# Patient Record
Sex: MALE | Race: BLACK OR AFRICAN AMERICAN | NOT HISPANIC OR LATINO
[De-identification: names, ages, dates, MRNs, and addresses within clinical notes are randomized per-mention and may not be internally consistent; named-entity substitution may affect disease eponyms.]

---

## 2017-05-09 ENCOUNTER — APPOINTMENT (OUTPATIENT)
Dept: ENDOCRINOLOGY | Facility: CLINIC | Age: 82
End: 2017-05-09

## 2017-05-09 ENCOUNTER — RESULT CHARGE (OUTPATIENT)
Age: 82
End: 2017-05-09

## 2017-05-09 VITALS
BODY MASS INDEX: 33.29 KG/M2 | DIASTOLIC BLOOD PRESSURE: 60 MMHG | SYSTOLIC BLOOD PRESSURE: 120 MMHG | RESPIRATION RATE: 17 BRPM | OXYGEN SATURATION: 94 % | WEIGHT: 195 LBS | HEIGHT: 64 IN | HEART RATE: 65 BPM | TEMPERATURE: 98.4 F

## 2017-05-09 LAB — GLUCOSE BLDC GLUCOMTR-MCNC: 53

## 2017-05-09 RX ORDER — TAMSULOSIN HYDROCHLORIDE 0.4 MG/1
0.4 CAPSULE ORAL
Qty: 90 | Refills: 0 | Status: ACTIVE | COMMUNITY
Start: 2017-05-09 | End: 1900-01-01

## 2017-05-11 LAB
ALBUMIN SERPL ELPH-MCNC: 4.4 G/DL
ALP BLD-CCNC: 61 U/L
ALT SERPL-CCNC: 8 U/L
ANION GAP SERPL CALC-SCNC: 19 MMOL/L
AST SERPL-CCNC: 26 U/L
BILIRUB SERPL-MCNC: 0.4 MG/DL
BUN SERPL-MCNC: 26 MG/DL
CALCIUM SERPL-MCNC: 10 MG/DL
CHLORIDE SERPL-SCNC: 103 MMOL/L
CHOLEST SERPL-MCNC: 174 MG/DL
CHOLEST/HDLC SERPL: 4 RATIO
CO2 SERPL-SCNC: 26 MMOL/L
CREAT SERPL-MCNC: 1.08 MG/DL
GLUCOSE SERPL-MCNC: 71 MG/DL
HBA1C MFR BLD HPLC: 8.9 %
HDLC SERPL-MCNC: 41 MG/DL
LDLC SERPL CALC-MCNC: 113 MG/DL
POTASSIUM SERPL-SCNC: 4.8 MMOL/L
PROT SERPL-MCNC: 8.1 G/DL
SODIUM SERPL-SCNC: 148 MMOL/L
TRIGL SERPL-MCNC: 101 MG/DL
TSH SERPL-ACNC: 3.21 UIU/ML

## 2017-06-16 ENCOUNTER — MED ADMIN CHARGE (OUTPATIENT)
Age: 82
End: 2017-06-16

## 2017-10-03 ENCOUNTER — OUTPATIENT (OUTPATIENT)
Dept: OUTPATIENT SERVICES | Facility: HOSPITAL | Age: 82
LOS: 1 days | End: 2017-10-03
Payer: COMMERCIAL

## 2017-10-03 VITALS
RESPIRATION RATE: 18 BRPM | HEART RATE: 87 BPM | TEMPERATURE: 98 F | HEIGHT: 67 IN | DIASTOLIC BLOOD PRESSURE: 68 MMHG | WEIGHT: 250 LBS | OXYGEN SATURATION: 96 % | SYSTOLIC BLOOD PRESSURE: 144 MMHG

## 2017-10-03 DIAGNOSIS — R07.9 CHEST PAIN, UNSPECIFIED: ICD-10-CM

## 2017-10-03 LAB
ALBUMIN SERPL ELPH-MCNC: 4.2 G/DL — SIGNIFICANT CHANGE UP (ref 3.3–5)
ALP SERPL-CCNC: 62 U/L — SIGNIFICANT CHANGE UP (ref 40–120)
ALT FLD-CCNC: 15 U/L RC — SIGNIFICANT CHANGE UP (ref 10–45)
ANION GAP SERPL CALC-SCNC: 14 MMOL/L — SIGNIFICANT CHANGE UP (ref 5–17)
AST SERPL-CCNC: 24 U/L — SIGNIFICANT CHANGE UP (ref 10–40)
BILIRUB SERPL-MCNC: 0.3 MG/DL — SIGNIFICANT CHANGE UP (ref 0.2–1.2)
BUN SERPL-MCNC: 18 MG/DL — SIGNIFICANT CHANGE UP (ref 7–23)
CALCIUM SERPL-MCNC: 9.6 MG/DL — SIGNIFICANT CHANGE UP (ref 8.4–10.5)
CHLORIDE SERPL-SCNC: 101 MMOL/L — SIGNIFICANT CHANGE UP (ref 96–108)
CO2 SERPL-SCNC: 31 MMOL/L — SIGNIFICANT CHANGE UP (ref 22–31)
CREAT SERPL-MCNC: 0.99 MG/DL — SIGNIFICANT CHANGE UP (ref 0.5–1.3)
GLUCOSE SERPL-MCNC: 58 MG/DL — LOW (ref 70–99)
HCT VFR BLD CALC: 40.1 % — SIGNIFICANT CHANGE UP (ref 39–50)
HGB BLD-MCNC: 12.3 G/DL — LOW (ref 13–17)
MCHC RBC-ENTMCNC: 26 PG — LOW (ref 27–34)
MCHC RBC-ENTMCNC: 30.7 GM/DL — LOW (ref 32–36)
MCV RBC AUTO: 84.5 FL — SIGNIFICANT CHANGE UP (ref 80–100)
PLATELET # BLD AUTO: 188 K/UL — SIGNIFICANT CHANGE UP (ref 150–400)
POTASSIUM SERPL-MCNC: 4.3 MMOL/L — SIGNIFICANT CHANGE UP (ref 3.5–5.3)
POTASSIUM SERPL-SCNC: 4.3 MMOL/L — SIGNIFICANT CHANGE UP (ref 3.5–5.3)
PROT SERPL-MCNC: 7.7 G/DL — SIGNIFICANT CHANGE UP (ref 6–8.3)
RBC # BLD: 4.75 M/UL — SIGNIFICANT CHANGE UP (ref 4.2–5.8)
RBC # FLD: 13.4 % — SIGNIFICANT CHANGE UP (ref 10.3–14.5)
SODIUM SERPL-SCNC: 146 MMOL/L — HIGH (ref 135–145)
WBC # BLD: 4.5 K/UL — SIGNIFICANT CHANGE UP (ref 3.8–10.5)
WBC # FLD AUTO: 4.5 K/UL — SIGNIFICANT CHANGE UP (ref 3.8–10.5)

## 2017-10-03 PROCEDURE — 80053 COMPREHEN METABOLIC PANEL: CPT

## 2017-10-03 PROCEDURE — 85027 COMPLETE CBC AUTOMATED: CPT

## 2017-10-03 PROCEDURE — 99152 MOD SED SAME PHYS/QHP 5/>YRS: CPT

## 2017-10-03 PROCEDURE — C1887: CPT

## 2017-10-03 PROCEDURE — 93010 ELECTROCARDIOGRAM REPORT: CPT

## 2017-10-03 PROCEDURE — 93005 ELECTROCARDIOGRAM TRACING: CPT

## 2017-10-03 PROCEDURE — 93458 L HRT ARTERY/VENTRICLE ANGIO: CPT

## 2017-10-03 PROCEDURE — C1769: CPT

## 2017-10-03 RX ORDER — SODIUM CHLORIDE 9 MG/ML
3 INJECTION INTRAMUSCULAR; INTRAVENOUS; SUBCUTANEOUS EVERY 8 HOURS
Qty: 0 | Refills: 0 | Status: DISCONTINUED | OUTPATIENT
Start: 2017-10-03 | End: 2017-10-18

## 2017-10-03 NOTE — H&P CARDIOLOGY - HISTORY OF PRESENT ILLNESS
82 years old male pt with PMHx of HTN, HLD, DMT2 (A1C 9), MI, CAD with stent 12 years ago([facility]) who presents for cardiac cath. Pt reports that he has been experiencing chest pain on & off with left arm numbness, evaluated by Dr. Mando Arshad, had abnormal stress test. Currently denies chest pain or SOB.   Stress test: Moderate area of mild to moderate myocardial hypoperfusion at the inferior wall and inferolateral in response of Adenosine infusion. A smaller area of mild apical hypo perfusion is noted as well. Inferior and inferolateral changes persist on rest images but the apical abnormality is improved. Inferior and inferolateral infarction with mild area of apical ischemia is suggested, EF 62%

## 2017-10-03 NOTE — H&P CARDIOLOGY - PMH
Benign prostatic hypertrophy    CAD (coronary artery disease)    Diabetes    GERD (gastroesophageal reflux disease)    HLD (hyperlipidemia)    HTN (hypertension)    MI (myocardial infarction)    Prostate cancer    Stented coronary artery

## 2018-03-20 ENCOUNTER — APPOINTMENT (OUTPATIENT)
Dept: ENDOCRINOLOGY | Facility: CLINIC | Age: 83
End: 2018-03-20
Payer: MEDICARE

## 2018-03-20 VITALS
DIASTOLIC BLOOD PRESSURE: 60 MMHG | SYSTOLIC BLOOD PRESSURE: 118 MMHG | HEIGHT: 64 IN | WEIGHT: 189 LBS | BODY MASS INDEX: 32.27 KG/M2

## 2018-03-20 DIAGNOSIS — E11.65 TYPE 2 DIABETES MELLITUS WITH HYPERGLYCEMIA: ICD-10-CM

## 2018-03-20 LAB — GLUCOSE BLDC GLUCOMTR-MCNC: 60

## 2018-03-20 PROCEDURE — 99214 OFFICE O/P EST MOD 30 MIN: CPT | Mod: 25

## 2018-03-20 PROCEDURE — 82962 GLUCOSE BLOOD TEST: CPT

## 2018-03-26 ENCOUNTER — MEDICATION RENEWAL (OUTPATIENT)
Age: 83
End: 2018-03-26

## 2018-03-26 RX ORDER — BLOOD SUGAR DIAGNOSTIC
STRIP MISCELLANEOUS 3 TIMES DAILY
Qty: 270 | Refills: 0 | Status: ACTIVE | COMMUNITY
Start: 2018-03-20 | End: 1900-01-01

## 2018-03-27 ENCOUNTER — MEDICATION RENEWAL (OUTPATIENT)
Age: 83
End: 2018-03-27

## 2018-06-13 ENCOUNTER — MEDICATION RENEWAL (OUTPATIENT)
Age: 83
End: 2018-06-13

## 2018-06-14 RX ORDER — INSULIN ASPART 100 [IU]/ML
(70-30) 100 INJECTION, SUSPENSION SUBCUTANEOUS TWICE DAILY
Qty: 19 | Refills: 0 | Status: ACTIVE | COMMUNITY
Start: 2017-05-09 | End: 1900-01-01

## 2018-09-12 ENCOUNTER — EMERGENCY (EMERGENCY)
Facility: HOSPITAL | Age: 83
LOS: 1 days | Discharge: LEFT BEFORE DISCHARGE | End: 2018-09-12
Admitting: EMERGENCY MEDICINE

## 2018-09-12 VITALS
TEMPERATURE: 100 F | SYSTOLIC BLOOD PRESSURE: 104 MMHG | OXYGEN SATURATION: 98 % | DIASTOLIC BLOOD PRESSURE: 64 MMHG | RESPIRATION RATE: 16 BRPM | HEART RATE: 68 BPM

## 2018-09-12 PROBLEM — I10 ESSENTIAL (PRIMARY) HYPERTENSION: Chronic | Status: ACTIVE | Noted: 2017-10-03

## 2018-09-12 PROBLEM — C61 MALIGNANT NEOPLASM OF PROSTATE: Chronic | Status: ACTIVE | Noted: 2017-10-03

## 2018-09-12 PROBLEM — I21.3 ST ELEVATION (STEMI) MYOCARDIAL INFARCTION OF UNSPECIFIED SITE: Chronic | Status: ACTIVE | Noted: 2017-10-03

## 2018-09-12 PROBLEM — K21.9 GASTRO-ESOPHAGEAL REFLUX DISEASE WITHOUT ESOPHAGITIS: Chronic | Status: ACTIVE | Noted: 2017-10-03

## 2018-09-12 PROBLEM — E78.5 HYPERLIPIDEMIA, UNSPECIFIED: Chronic | Status: ACTIVE | Noted: 2017-10-03

## 2018-09-12 PROBLEM — I25.10 ATHEROSCLEROTIC HEART DISEASE OF NATIVE CORONARY ARTERY WITHOUT ANGINA PECTORIS: Chronic | Status: ACTIVE | Noted: 2017-10-03

## 2018-09-12 PROBLEM — N40.0 BENIGN PROSTATIC HYPERPLASIA WITHOUT LOWER URINARY TRACT SYMPTOMS: Chronic | Status: ACTIVE | Noted: 2017-10-03

## 2018-09-12 NOTE — ED ADULT TRIAGE NOTE - CHIEF COMPLAINT QUOTE
pt bibems from home for altered mental status, bs in the field was 43, got half amp of d50, now back at baseline mental status. has 18g in left forearm. pmhx: dm

## 2018-09-12 NOTE — ED ADULT NURSE NOTE - ED STAT RN HANDOFF DETAILS
son with pt in the ambulance bay. states that his father wants to go home. he is going to call a cab so they can leave. pt A&Ox4, pt's iv removed. pt and son left the ambulance bay

## 2018-10-03 ENCOUNTER — INPATIENT (INPATIENT)
Facility: HOSPITAL | Age: 83
LOS: 1 days | Discharge: HOME | End: 2018-10-05
Attending: HOSPITALIST | Admitting: HOSPITALIST
Payer: MEDICARE

## 2018-10-03 VITALS
TEMPERATURE: 99 F | DIASTOLIC BLOOD PRESSURE: 52 MMHG | OXYGEN SATURATION: 100 % | RESPIRATION RATE: 72 BRPM | SYSTOLIC BLOOD PRESSURE: 95 MMHG

## 2018-10-03 DIAGNOSIS — D49.0 NEOPLASM OF UNSPECIFIED BEHAVIOR OF DIGESTIVE SYSTEM: ICD-10-CM

## 2018-10-03 LAB
ALBUMIN SERPL ELPH-MCNC: 3.6 G/DL — SIGNIFICANT CHANGE UP (ref 3.3–5)
ALP SERPL-CCNC: 55 U/L — SIGNIFICANT CHANGE UP (ref 40–120)
ALT FLD-CCNC: 7 U/L — SIGNIFICANT CHANGE UP (ref 4–41)
APTT BLD: 29.3 SEC — SIGNIFICANT CHANGE UP (ref 27.5–37.4)
AST SERPL-CCNC: 13 U/L — SIGNIFICANT CHANGE UP (ref 4–40)
BASE EXCESS BLDV CALC-SCNC: 5 MMOL/L — SIGNIFICANT CHANGE UP
BASOPHILS # BLD AUTO: 0.01 K/UL — SIGNIFICANT CHANGE UP (ref 0–0.2)
BASOPHILS NFR BLD AUTO: 0.2 % — SIGNIFICANT CHANGE UP (ref 0–2)
BILIRUB SERPL-MCNC: 0.4 MG/DL — SIGNIFICANT CHANGE UP (ref 0.2–1.2)
BLOOD GAS VENOUS - CREATININE: 1.27 MG/DL — SIGNIFICANT CHANGE UP (ref 0.5–1.3)
BUN SERPL-MCNC: 23 MG/DL — SIGNIFICANT CHANGE UP (ref 7–23)
CALCIUM SERPL-MCNC: 9.4 MG/DL — SIGNIFICANT CHANGE UP (ref 8.4–10.5)
CHLORIDE BLDV-SCNC: 99 MMOL/L — SIGNIFICANT CHANGE UP (ref 96–108)
CHLORIDE SERPL-SCNC: 97 MMOL/L — LOW (ref 98–107)
CO2 SERPL-SCNC: 25 MMOL/L — SIGNIFICANT CHANGE UP (ref 22–31)
CREAT SERPL-MCNC: 1.24 MG/DL — SIGNIFICANT CHANGE UP (ref 0.5–1.3)
EOSINOPHIL # BLD AUTO: 0.08 K/UL — SIGNIFICANT CHANGE UP (ref 0–0.5)
EOSINOPHIL NFR BLD AUTO: 1.4 % — SIGNIFICANT CHANGE UP (ref 0–6)
GAS PNL BLDV: 137 MMOL/L — SIGNIFICANT CHANGE UP (ref 136–146)
GLUCOSE BLDV-MCNC: 86 — SIGNIFICANT CHANGE UP (ref 70–99)
GLUCOSE SERPL-MCNC: 85 MG/DL — SIGNIFICANT CHANGE UP (ref 70–99)
HCO3 BLDV-SCNC: 28 MMOL/L — HIGH (ref 20–27)
HCT VFR BLD CALC: 29.4 % — LOW (ref 39–50)
HCT VFR BLDV CALC: 29.7 % — LOW (ref 39–51)
HGB BLD-MCNC: 9.1 G/DL — LOW (ref 13–17)
HGB BLDV-MCNC: 9.6 G/DL — LOW (ref 13–17)
IMM GRANULOCYTES # BLD AUTO: 0.01 # — SIGNIFICANT CHANGE UP
IMM GRANULOCYTES NFR BLD AUTO: 0.2 % — SIGNIFICANT CHANGE UP (ref 0–1.5)
INR BLD: 1.09 — SIGNIFICANT CHANGE UP (ref 0.88–1.17)
LACTATE BLDV-MCNC: 1.5 MMOL/L — SIGNIFICANT CHANGE UP (ref 0.5–2)
LIDOCAIN IGE QN: 9.7 U/L — SIGNIFICANT CHANGE UP (ref 7–60)
LYMPHOCYTES # BLD AUTO: 1.92 K/UL — SIGNIFICANT CHANGE UP (ref 1–3.3)
LYMPHOCYTES # BLD AUTO: 34.5 % — SIGNIFICANT CHANGE UP (ref 13–44)
MCHC RBC-ENTMCNC: 22.7 PG — LOW (ref 27–34)
MCHC RBC-ENTMCNC: 31 % — LOW (ref 32–36)
MCV RBC AUTO: 73.3 FL — LOW (ref 80–100)
MONOCYTES # BLD AUTO: 0.78 K/UL — SIGNIFICANT CHANGE UP (ref 0–0.9)
MONOCYTES NFR BLD AUTO: 14 % — SIGNIFICANT CHANGE UP (ref 2–14)
NEUTROPHILS # BLD AUTO: 2.77 K/UL — SIGNIFICANT CHANGE UP (ref 1.8–7.4)
NEUTROPHILS NFR BLD AUTO: 49.7 % — SIGNIFICANT CHANGE UP (ref 43–77)
NRBC # FLD: 0 — SIGNIFICANT CHANGE UP
OB PNL STL: POSITIVE — SIGNIFICANT CHANGE UP
PCO2 BLDV: 48 MMHG — SIGNIFICANT CHANGE UP (ref 41–51)
PH BLDV: 7.41 PH — SIGNIFICANT CHANGE UP (ref 7.32–7.43)
PLATELET # BLD AUTO: 292 K/UL — SIGNIFICANT CHANGE UP (ref 150–400)
PMV BLD: 9.8 FL — SIGNIFICANT CHANGE UP (ref 7–13)
PO2 BLDV: < 24 MMHG — LOW (ref 35–40)
POTASSIUM BLDV-SCNC: 3.8 MMOL/L — SIGNIFICANT CHANGE UP (ref 3.4–4.5)
POTASSIUM SERPL-MCNC: 4.1 MMOL/L — SIGNIFICANT CHANGE UP (ref 3.5–5.3)
POTASSIUM SERPL-SCNC: 4.1 MMOL/L — SIGNIFICANT CHANGE UP (ref 3.5–5.3)
PROT SERPL-MCNC: 7 G/DL — SIGNIFICANT CHANGE UP (ref 6–8.3)
PROTHROM AB SERPL-ACNC: 12.6 SEC — SIGNIFICANT CHANGE UP (ref 9.8–13.1)
RBC # BLD: 4.01 M/UL — LOW (ref 4.2–5.8)
RBC # FLD: 17.3 % — HIGH (ref 10.3–14.5)
SAO2 % BLDV: 29.6 % — LOW (ref 60–85)
SODIUM SERPL-SCNC: 138 MMOL/L — SIGNIFICANT CHANGE UP (ref 135–145)
WBC # BLD: 5.57 K/UL — SIGNIFICANT CHANGE UP (ref 3.8–10.5)
WBC # FLD AUTO: 5.57 K/UL — SIGNIFICANT CHANGE UP (ref 3.8–10.5)

## 2018-10-03 PROCEDURE — 74177 CT ABD & PELVIS W/CONTRAST: CPT | Mod: 26

## 2018-10-03 RX ORDER — ONDANSETRON 8 MG/1
4 TABLET, FILM COATED ORAL ONCE
Qty: 0 | Refills: 0 | Status: COMPLETED | OUTPATIENT
Start: 2018-10-03 | End: 2018-10-03

## 2018-10-03 RX ORDER — SODIUM CHLORIDE 9 MG/ML
1000 INJECTION INTRAMUSCULAR; INTRAVENOUS; SUBCUTANEOUS ONCE
Qty: 0 | Refills: 0 | Status: COMPLETED | OUTPATIENT
Start: 2018-10-03 | End: 2018-10-03

## 2018-10-03 RX ORDER — FAMOTIDINE 10 MG/ML
20 INJECTION INTRAVENOUS ONCE
Qty: 0 | Refills: 0 | Status: COMPLETED | OUTPATIENT
Start: 2018-10-03 | End: 2018-10-03

## 2018-10-03 RX ORDER — MORPHINE SULFATE 50 MG/1
4 CAPSULE, EXTENDED RELEASE ORAL ONCE
Qty: 0 | Refills: 0 | Status: DISCONTINUED | OUTPATIENT
Start: 2018-10-03 | End: 2018-10-03

## 2018-10-03 RX ADMIN — SODIUM CHLORIDE 1000 MILLILITER(S): 9 INJECTION INTRAMUSCULAR; INTRAVENOUS; SUBCUTANEOUS at 20:46

## 2018-10-03 RX ADMIN — MORPHINE SULFATE 4 MILLIGRAM(S): 50 CAPSULE, EXTENDED RELEASE ORAL at 20:47

## 2018-10-03 RX ADMIN — ONDANSETRON 4 MILLIGRAM(S): 8 TABLET, FILM COATED ORAL at 17:22

## 2018-10-03 RX ADMIN — SODIUM CHLORIDE 1000 MILLILITER(S): 9 INJECTION INTRAMUSCULAR; INTRAVENOUS; SUBCUTANEOUS at 17:22

## 2018-10-03 RX ADMIN — FAMOTIDINE 20 MILLIGRAM(S): 10 INJECTION INTRAVENOUS at 17:22

## 2018-10-03 NOTE — ED ADULT NURSE REASSESSMENT NOTE - NS ED NURSE REASSESS COMMENT FT1
Attempted report [id number]. "Nurse is not here." Patient is stable at this time, walks with walker. No distress. Will transfer to floor.

## 2018-10-03 NOTE — ED PROVIDER NOTE - NS ED ROS FT
CONST: no fevers, no chills  EYES: no pain, no vision changes  ENT: no sore throat, no ear pain, no change in hearing  CV: no chest pain, no leg swelling  RESP: no shortness of breath, no cough  ABD: +abdominal pain, +nausea, no vomiting, +diarrhea  : no dysuria, no flank pain, no hematuria  MSK: no back pain, no extremity pain  NEURO: no headache or additional neurologic complaints  HEME: no easy bleeding  SKIN:  no rash

## 2018-10-03 NOTE — ED PROVIDER NOTE - ATTENDING CONTRIBUTION TO CARE
Attending Statement: I have personally seen and examined this patient. I have fully participated in the care of this patient. I have reviewed all pertinent clinical information, including history physical exam, plan and the Resident's note and agree except as noted  82yo M hx of BPH, proastac ca sp seeding, GERD, HLD,  HTN, HLD, DM, CAD, sp stent 12 years  ago pw lower abdominal pain for two months. Worsening over the last couple of days, "worse" now with watery, non bloody diarrhea. Not tolerating po. No fever/chills. no recent hosp or abx use. no sick contact. no chest pain or sob. Has had weight loss about 35lb in "several months"  Vital signs noted. looks uncomfortable, dry mm, pale, non icteric. normal S1-S2 good air entry. soft non distended tender lower abdomen, no rebound or guarding. neg Frias's no cvat.  AO3  plan labs, ua, ct abdomen, IVF, pain med,  likely admit.

## 2018-10-03 NOTE — ED PROVIDER NOTE - PHYSICAL EXAMINATION
Michelle Flor DO.:   GENERAL: Patient awake alert NAD.  HEENT: NC/AT, Moist mucous membranes, PERRL, EOMI.  LUNGS: CTAB, no wheezes or crackles.   CARDIAC: RRR, no m/r/g.    ABDOMEN: Soft, +tender in RLQ, suprapubic, LLQ, ND, No rebound, guarding. No CVA tenderness.   EXT: No edema. No calf tenderness.  MSK: No spinal tenderness, no pain with movement, no deformities.  NEURO: A&Ox3. Gait with cane.  SKIN: Warm and dry. No rash.  PSYCH: Normal affect.

## 2018-10-03 NOTE — ED ADULT NURSE NOTE - NSIMPLEMENTINTERV_GEN_ALL_ED
Implemented All Fall Risk Interventions:  [place] to call system. Call bell, personal items and telephone within reach. Instruct patient to call for assistance. Room bathroom lighting operational. Non-slip footwear when patient is off stretcher. Physically safe environment: no spills, clutter or unnecessary equipment. Stretcher in lowest position, wheels locked, appropriate side rails in place. Provide visual cue, wrist band, yellow gown, etc. Monitor gait and stability. Monitor for mental status changes and reorient to person, place, and time. Review medications for side effects contributing to fall risk. Reinforce activity limits and safety measures with patient and family.

## 2018-10-03 NOTE — ED PROVIDER NOTE - PROGRESS NOTE DETAILS
A dec in H/H, will order a stool occult. pending ct abdomen. pt and family informed of results and plan to admit. LEONEL w hosp. pt admitted.

## 2018-10-03 NOTE — ED PROVIDER NOTE - MEDICAL DECISION MAKING DETAILS
83M p/w abdominal pain, weight loss. Concern for CA vs. achalasia. Will get CT abd/pel, labs, vbg. Admit if concerning results.

## 2018-10-03 NOTE — ED ADULT NURSE REASSESSMENT NOTE - NS ED NURSE REASSESS COMMENT FT1
Rpt rc'f from day RN. Patient awake, alert, respirations even and unlabored. Patient denies nausea, complains of general abdominal pain, medicated as ordered. BP stable, no other complaints at this time. Pending CT result, will continue to monitor

## 2018-10-03 NOTE — ED PROVIDER NOTE - OBJECTIVE STATEMENT
83M h/o BPH, Prostate CA (s/p seeding), GERD, HLD, HTN, DM, CAD (stent 12 years ago) presents with lower abdominal pain with watery diarrhea today, but also notes 35lb weight loss in last 2 months as well as inability to tolerate PO for those two months, states his stomach "gets upset" when he eats anything other than juice. No fevers, chills, bloody stools, headache.

## 2018-10-03 NOTE — ED ADULT NURSE NOTE - OBJECTIVE STATEMENT
Pt rec'd in 28, c/o abd pain, N/V and diarrhea for the past month. Denies anticoagulant use. Pt states he's unable to eat much due to his symptoms. PMH of DM and prostate cancer.

## 2018-10-04 DIAGNOSIS — C61 MALIGNANT NEOPLASM OF PROSTATE: ICD-10-CM

## 2018-10-04 DIAGNOSIS — Z29.9 ENCOUNTER FOR PROPHYLACTIC MEASURES, UNSPECIFIED: ICD-10-CM

## 2018-10-04 DIAGNOSIS — D64.9 ANEMIA, UNSPECIFIED: ICD-10-CM

## 2018-10-04 DIAGNOSIS — I25.10 ATHEROSCLEROTIC HEART DISEASE OF NATIVE CORONARY ARTERY WITHOUT ANGINA PECTORIS: ICD-10-CM

## 2018-10-04 DIAGNOSIS — N40.0 BENIGN PROSTATIC HYPERPLASIA WITHOUT LOWER URINARY TRACT SYMPTOMS: ICD-10-CM

## 2018-10-04 DIAGNOSIS — D49.0 NEOPLASM OF UNSPECIFIED BEHAVIOR OF DIGESTIVE SYSTEM: ICD-10-CM

## 2018-10-04 DIAGNOSIS — R19.5 OTHER FECAL ABNORMALITIES: ICD-10-CM

## 2018-10-04 DIAGNOSIS — I10 ESSENTIAL (PRIMARY) HYPERTENSION: ICD-10-CM

## 2018-10-04 DIAGNOSIS — R62.7 ADULT FAILURE TO THRIVE: ICD-10-CM

## 2018-10-04 DIAGNOSIS — R13.10 DYSPHAGIA, UNSPECIFIED: ICD-10-CM

## 2018-10-04 LAB
ALBUMIN SERPL ELPH-MCNC: 4 G/DL — SIGNIFICANT CHANGE UP (ref 3.3–5)
ALP SERPL-CCNC: 65 U/L — SIGNIFICANT CHANGE UP (ref 40–120)
ALT FLD-CCNC: 7 U/L — SIGNIFICANT CHANGE UP (ref 4–41)
APPEARANCE UR: CLEAR — SIGNIFICANT CHANGE UP
AST SERPL-CCNC: 13 U/L — SIGNIFICANT CHANGE UP (ref 4–40)
BACTERIA # UR AUTO: NEGATIVE — SIGNIFICANT CHANGE UP
BILIRUB SERPL-MCNC: 0.5 MG/DL — SIGNIFICANT CHANGE UP (ref 0.2–1.2)
BILIRUB UR-MCNC: NEGATIVE — SIGNIFICANT CHANGE UP
BLD GP AB SCN SERPL QL: NEGATIVE — SIGNIFICANT CHANGE UP
BLOOD UR QL VISUAL: NEGATIVE — SIGNIFICANT CHANGE UP
BUN SERPL-MCNC: 17 MG/DL — SIGNIFICANT CHANGE UP (ref 7–23)
CALCIUM SERPL-MCNC: 9.8 MG/DL — SIGNIFICANT CHANGE UP (ref 8.4–10.5)
CHLORIDE SERPL-SCNC: 98 MMOL/L — SIGNIFICANT CHANGE UP (ref 98–107)
CO2 SERPL-SCNC: 25 MMOL/L — SIGNIFICANT CHANGE UP (ref 22–31)
COLOR SPEC: YELLOW — SIGNIFICANT CHANGE UP
CREAT SERPL-MCNC: 0.9 MG/DL — SIGNIFICANT CHANGE UP (ref 0.5–1.3)
GLUCOSE BLDC GLUCOMTR-MCNC: 273 MG/DL — HIGH (ref 70–99)
GLUCOSE BLDC GLUCOMTR-MCNC: 275 MG/DL — HIGH (ref 70–99)
GLUCOSE BLDC GLUCOMTR-MCNC: 297 MG/DL — HIGH (ref 70–99)
GLUCOSE SERPL-MCNC: 159 MG/DL — HIGH (ref 70–99)
GLUCOSE UR-MCNC: NEGATIVE — SIGNIFICANT CHANGE UP
HCT VFR BLD CALC: 33.4 % — LOW (ref 39–50)
HCT VFR BLD CALC: 36.4 % — LOW (ref 39–50)
HGB BLD-MCNC: 10.1 G/DL — LOW (ref 13–17)
HGB BLD-MCNC: 11.2 G/DL — LOW (ref 13–17)
HYALINE CASTS # UR AUTO: NEGATIVE — SIGNIFICANT CHANGE UP
KETONES UR-MCNC: SIGNIFICANT CHANGE UP
LEUKOCYTE ESTERASE UR-ACNC: NEGATIVE — SIGNIFICANT CHANGE UP
MCHC RBC-ENTMCNC: 22.7 PG — LOW (ref 27–34)
MCHC RBC-ENTMCNC: 22.8 PG — LOW (ref 27–34)
MCHC RBC-ENTMCNC: 30.2 % — LOW (ref 32–36)
MCHC RBC-ENTMCNC: 30.8 % — LOW (ref 32–36)
MCV RBC AUTO: 74 FL — LOW (ref 80–100)
MCV RBC AUTO: 75.1 FL — LOW (ref 80–100)
NITRITE UR-MCNC: NEGATIVE — SIGNIFICANT CHANGE UP
NRBC # FLD: 0 — SIGNIFICANT CHANGE UP
NRBC # FLD: 0 — SIGNIFICANT CHANGE UP
PH UR: 6 — SIGNIFICANT CHANGE UP (ref 5–8)
PLATELET # BLD AUTO: 300 K/UL — SIGNIFICANT CHANGE UP (ref 150–400)
PLATELET # BLD AUTO: 353 K/UL — SIGNIFICANT CHANGE UP (ref 150–400)
PMV BLD: 9.3 FL — SIGNIFICANT CHANGE UP (ref 7–13)
PMV BLD: 9.4 FL — SIGNIFICANT CHANGE UP (ref 7–13)
POTASSIUM SERPL-MCNC: 4.1 MMOL/L — SIGNIFICANT CHANGE UP (ref 3.5–5.3)
POTASSIUM SERPL-SCNC: 4.1 MMOL/L — SIGNIFICANT CHANGE UP (ref 3.5–5.3)
PROT SERPL-MCNC: 7.9 G/DL — SIGNIFICANT CHANGE UP (ref 6–8.3)
PROT UR-MCNC: 20 — SIGNIFICANT CHANGE UP
RBC # BLD: 4.45 M/UL — SIGNIFICANT CHANGE UP (ref 4.2–5.8)
RBC # BLD: 4.92 M/UL — SIGNIFICANT CHANGE UP (ref 4.2–5.8)
RBC # FLD: 17.2 % — HIGH (ref 10.3–14.5)
RBC # FLD: 17.5 % — HIGH (ref 10.3–14.5)
RBC CASTS # UR COMP ASSIST: SIGNIFICANT CHANGE UP (ref 0–?)
RH IG SCN BLD-IMP: POSITIVE — SIGNIFICANT CHANGE UP
SODIUM SERPL-SCNC: 137 MMOL/L — SIGNIFICANT CHANGE UP (ref 135–145)
SP GR SPEC: 1.05 — HIGH (ref 1–1.04)
SQUAMOUS # UR AUTO: SIGNIFICANT CHANGE UP
UROBILINOGEN FLD QL: NORMAL — SIGNIFICANT CHANGE UP
WBC # BLD: 5.54 K/UL — SIGNIFICANT CHANGE UP (ref 3.8–10.5)
WBC # BLD: 7.09 K/UL — SIGNIFICANT CHANGE UP (ref 3.8–10.5)
WBC # FLD AUTO: 5.54 K/UL — SIGNIFICANT CHANGE UP (ref 3.8–10.5)
WBC # FLD AUTO: 7.09 K/UL — SIGNIFICANT CHANGE UP (ref 3.8–10.5)
WBC UR QL: SIGNIFICANT CHANGE UP (ref 0–?)

## 2018-10-04 PROCEDURE — 12345: CPT | Mod: NC,GC

## 2018-10-04 PROCEDURE — 99223 1ST HOSP IP/OBS HIGH 75: CPT | Mod: GC

## 2018-10-04 PROCEDURE — 99222 1ST HOSP IP/OBS MODERATE 55: CPT | Mod: GC

## 2018-10-04 RX ORDER — GLUCAGON INJECTION, SOLUTION 0.5 MG/.1ML
1 INJECTION, SOLUTION SUBCUTANEOUS ONCE
Qty: 0 | Refills: 0 | Status: DISCONTINUED | OUTPATIENT
Start: 2018-10-04 | End: 2018-10-05

## 2018-10-04 RX ORDER — DEXTROSE 50 % IN WATER 50 %
12.5 SYRINGE (ML) INTRAVENOUS ONCE
Qty: 0 | Refills: 0 | Status: DISCONTINUED | OUTPATIENT
Start: 2018-10-04 | End: 2018-10-05

## 2018-10-04 RX ORDER — SODIUM CHLORIDE 9 MG/ML
1000 INJECTION, SOLUTION INTRAVENOUS
Qty: 0 | Refills: 0 | Status: DISCONTINUED | OUTPATIENT
Start: 2018-10-04 | End: 2018-10-05

## 2018-10-04 RX ORDER — SOD SULF/SODIUM/NAHCO3/KCL/PEG
1000 SOLUTION, RECONSTITUTED, ORAL ORAL EVERY 4 HOURS
Qty: 0 | Refills: 0 | Status: COMPLETED | OUTPATIENT
Start: 2018-10-04 | End: 2018-10-04

## 2018-10-04 RX ORDER — DEXTROSE 50 % IN WATER 50 %
25 SYRINGE (ML) INTRAVENOUS ONCE
Qty: 0 | Refills: 0 | Status: DISCONTINUED | OUTPATIENT
Start: 2018-10-04 | End: 2018-10-05

## 2018-10-04 RX ORDER — INSULIN LISPRO 100/ML
VIAL (ML) SUBCUTANEOUS AT BEDTIME
Qty: 0 | Refills: 0 | Status: DISCONTINUED | OUTPATIENT
Start: 2018-10-04 | End: 2018-10-05

## 2018-10-04 RX ORDER — METFORMIN HYDROCHLORIDE 850 MG/1
2 TABLET ORAL
Qty: 0 | Refills: 0 | COMMUNITY

## 2018-10-04 RX ORDER — INSULIN LISPRO 100/ML
VIAL (ML) SUBCUTANEOUS
Qty: 0 | Refills: 0 | Status: DISCONTINUED | OUTPATIENT
Start: 2018-10-04 | End: 2018-10-05

## 2018-10-04 RX ORDER — INSULIN GLARGINE 100 [IU]/ML
5 INJECTION, SOLUTION SUBCUTANEOUS AT BEDTIME
Qty: 0 | Refills: 0 | Status: DISCONTINUED | OUTPATIENT
Start: 2018-10-04 | End: 2018-10-05

## 2018-10-04 RX ORDER — TAMSULOSIN HYDROCHLORIDE 0.4 MG/1
0.8 CAPSULE ORAL AT BEDTIME
Qty: 0 | Refills: 0 | Status: DISCONTINUED | OUTPATIENT
Start: 2018-10-04 | End: 2018-10-05

## 2018-10-04 RX ORDER — MORPHINE SULFATE 50 MG/1
2 CAPSULE, EXTENDED RELEASE ORAL EVERY 4 HOURS
Qty: 0 | Refills: 0 | Status: DISCONTINUED | OUTPATIENT
Start: 2018-10-04 | End: 2018-10-05

## 2018-10-04 RX ORDER — PANTOPRAZOLE SODIUM 20 MG/1
40 TABLET, DELAYED RELEASE ORAL
Qty: 0 | Refills: 0 | Status: DISCONTINUED | OUTPATIENT
Start: 2018-10-04 | End: 2018-10-05

## 2018-10-04 RX ORDER — DEXTROSE 50 % IN WATER 50 %
15 SYRINGE (ML) INTRAVENOUS ONCE
Qty: 0 | Refills: 0 | Status: DISCONTINUED | OUTPATIENT
Start: 2018-10-04 | End: 2018-10-05

## 2018-10-04 RX ORDER — INSULIN ASPART 100 [IU]/ML
30 INJECTION, SUSPENSION SUBCUTANEOUS
Qty: 0 | Refills: 0 | COMMUNITY

## 2018-10-04 RX ADMIN — Medication 1000 MILLILITER(S): at 17:32

## 2018-10-04 RX ADMIN — Medication 3: at 17:32

## 2018-10-04 RX ADMIN — Medication 3: at 12:44

## 2018-10-04 RX ADMIN — TAMSULOSIN HYDROCHLORIDE 0.8 MILLIGRAM(S): 0.4 CAPSULE ORAL at 21:27

## 2018-10-04 RX ADMIN — INSULIN GLARGINE 5 UNIT(S): 100 INJECTION, SOLUTION SUBCUTANEOUS at 21:22

## 2018-10-04 RX ADMIN — Medication 1: at 21:29

## 2018-10-04 NOTE — H&P ADULT - ASSESSMENT
83M with PMHx of BPH, Prostate CA (s/p seeding and RT), GERD, HLD, DM2, CAD s/p stent (12 years ago) presenting with abdominal pain x 1 month, loose BMs, and significant weight loss found to have cecal mass on CTAP concerning for malignancy

## 2018-10-04 NOTE — H&P ADULT - PROBLEM SELECTOR PLAN 6
Hx of obstructive CAD w/ stent in the past  Needs Med Rec   No active issues Hx of prostate cancer s/p SEEDing and RT   Plan as above

## 2018-10-04 NOTE — CONSULT NOTE ADULT - SUBJECTIVE AND OBJECTIVE BOX
GASTROENTEROLOGY INITIAL CONSULT NOTE    Chief Complaint:  Patient is a 83y old  Male who presents with a chief complaint of abdominal pain (04 Oct 2018 02:12)      HPI: 83y Male with past medical history CAD s/p PCI (on ASA/Plavix), HTN, HLD, BPH, prostate CA, DM2 who presented with abdominal pain. Patient reported one day of diffuse, crampy lower abdominal pain associated with loose brown bowel movements. Patient believes he has lost about 35 pounds over the last 2 months as he has had decreased PO intake and intermittent nausea with eating food.     Allergies:  No Known Allergies      Hospital Medications:      PMHX/PSHX:  Prostate cancer  Stented coronary artery  MI (myocardial infarction)  CAD (coronary artery disease)  HLD (hyperlipidemia)  GERD (gastroesophageal reflux disease)  Benign prostatic hypertrophy  Diabetes  HTN (hypertension)  No significant past surgical history      Family history:  No pertinent family history in first degree relatives      Social History:     ROS:     General:  No wt loss, fevers, chills, night sweats, fatigue,   Eyes:  Good vision, no reported pain  ENT:  No sore throat, pain, runny nose, dysphagia  CV:  No pain, palpitations, hypo/hypertension  Resp:  No dyspnea, cough, tachypnea, wheezing  GI:  see HPI  :  No pain, bleeding, incontinence, nocturia  Muscle:  No pain, weakness  Neuro:  No weakness, tingling, memory problems  Psych:  No fatigue, insomnia, mood problems, depression  Endocrine:  No polyuria, polydipsia, cold/heat intolerance  Heme:  No petechiae, ecchymosis, easy bruisability  Skin:  No rash, tattoos, scars, edema      PHYSICAL EXAM:   Vital Signs:  Vital Signs Last 24 Hrs  T(C): 36.4 (04 Oct 2018 05:11), Max: 37 (03 Oct 2018 15:19)  T(F): 97.5 (04 Oct 2018 05:11), Max: 98.6 (03 Oct 2018 15:19)  HR: 60 (04 Oct 2018 05:11) (60 - 76)  BP: 112/52 (04 Oct 2018 05:11) (95/52 - 136/53)  BP(mean): --  RR: 18 (04 Oct 2018 05:11) (16 - 72)  SpO2: 96% (04 Oct 2018 05:11) (95% - 100%)  Daily Height in cm: 153.92 (03 Oct 2018 23:59)    Daily Weight in k (04 Oct 2018 05:11)    GENERAL:  no acute distress  HEENT:  -icterus   CHEST:  clear bilaterally, no wheezes or rales  HEART:  RRR, S1S2  ABDOMEN:  soft, non-tender, non-distended, normoactive bowel sounds,  no hepato-splenomegaly  EXTEREMITIES:  no  edema  SKIN:  No rash/erythema/ecchymoses/petechiae/wounds/abscess/warm/dry  NEURO:  alert, oriented, conversant     LABS:                        9.1    5.57  )-----------( 292      ( 03 Oct 2018 16:00 )             29.4     10-03    138  |  97<L>  |  23  ----------------------------<  85  4.1   |  25  |  1.24    Ca    9.4      03 Oct 2018 16:00    TPro  7.0  /  Alb  3.6  /  TBili  0.4  /  DBili  x   /  AST  13  /  ALT  7   /  AlkPhos  55  10-03    LIVER FUNCTIONS - ( 03 Oct 2018 16:00 )  Alb: 3.6 g/dL / Pro: 7.0 g/dL / ALK PHOS: 55 u/L / ALT: 7 u/L / AST: 13 u/L / GGT: x           PT/INR - ( 03 Oct 2018 16:00 )   PT: 12.6 SEC;   INR: 1.09          PTT - ( 03 Oct 2018 16:00 )  PTT:29.3 SEC  Amylase Serum--      Lipase serum9.7       Ammonia--      Imaging: GASTROENTEROLOGY INITIAL CONSULT NOTE    Chief Complaint:  Patient is a 83y old  Male who presents with a chief complaint of abdominal pain (04 Oct 2018 02:12)      HPI: 83y Male with past medical history CAD s/p PCI (on ASA/Plavix), HTN, HLD, BPH, prostate CA, DM2 who presented with abdominal pain. Patient reported about one month of diffuse, crampy lower abdominal pain associated with loose brown bowel movements. Patient believes he has lost about 35 pounds over the last 2 months as he has had decreased PO intake and intermittent nausea with eating food. He also is intermittently nauseous. Patient feels like food has been getting "stuck" in his throat when he eats during this time as well. His last colonoscopy was over 10 years ago; he has previously had polpys on prior colonoscopies. He does not have a family history of colon cancer, gastric cancer, pancreatic cancer, colon polyps, or IBD.     Allergies:  No Known Allergies      Hospital Medications:      PMHX/PSHX:  Prostate cancer  Stented coronary artery  MI (myocardial infarction)  CAD (coronary artery disease)  HLD (hyperlipidemia)  GERD (gastroesophageal reflux disease)  Benign prostatic hypertrophy  Diabetes  HTN (hypertension)  No significant past surgical history      Family history:  No pertinent family history in first degree relatives      Social History:     ROS:     General:  No wt loss, fevers, chills, night sweats, fatigue,   Eyes:  Good vision, no reported pain  ENT:  No sore throat, pain, runny nose, dysphagia  CV:  No pain, palpitations, hypo/hypertension  Resp:  No dyspnea, cough, tachypnea, wheezing  GI:  see HPI  :  No pain, bleeding, incontinence, nocturia  Muscle:  No pain, weakness  Neuro:  No weakness, tingling, memory problems  Psych:  No fatigue, insomnia, mood problems, depression  Endocrine:  No polyuria, polydipsia, cold/heat intolerance  Heme:  No petechiae, ecchymosis, easy bruisability  Skin:  No rash, tattoos, scars, edema      PHYSICAL EXAM:   Vital Signs:  Vital Signs Last 24 Hrs  T(C): 36.4 (04 Oct 2018 05:11), Max: 37 (03 Oct 2018 15:19)  T(F): 97.5 (04 Oct 2018 05:11), Max: 98.6 (03 Oct 2018 15:19)  HR: 60 (04 Oct 2018 05:11) (60 - 76)  BP: 112/52 (04 Oct 2018 05:11) (95/52 - 136/53)  BP(mean): --  RR: 18 (04 Oct 2018 05:11) (16 - 72)  SpO2: 96% (04 Oct 2018 05:11) (95% - 100%)  Daily Height in cm: 153.92 (03 Oct 2018 23:59)    Daily Weight in k (04 Oct 2018 05:11)    GENERAL:  no acute distress  HEENT:  -icterus   CHEST:  clear bilaterally, no wheezes or rales  HEART:  RRR, S1S2  ABDOMEN:  soft, non-tender, non-distended, normoactive bowel sounds,  no hepato-splenomegaly  EXTEREMITIES:  no  edema  SKIN:  No rash/erythema/ecchymoses/petechiae/wounds/abscess/warm/dry  NEURO:  alert, oriented, conversant     LABS:                        9.1    5.57  )-----------( 292      ( 03 Oct 2018 16:00 )             29.4     10-03    138  |  97<L>  |  23  ----------------------------<  85  4.1   |  25  |  1.24    Ca    9.4      03 Oct 2018 16:00    TPro  7.0  /  Alb  3.6  /  TBili  0.4  /  DBili  x   /  AST  13  /  ALT  7   /  AlkPhos  55  10-03    LIVER FUNCTIONS - ( 03 Oct 2018 16:00 )  Alb: 3.6 g/dL / Pro: 7.0 g/dL / ALK PHOS: 55 u/L / ALT: 7 u/L / AST: 13 u/L / GGT: x           PT/INR - ( 03 Oct 2018 16:00 )   PT: 12.6 SEC;   INR: 1.09          PTT - ( 03 Oct 2018 16:00 )  PTT:29.3 SEC  Amylase Serum--      Lipase serum9.7       Ammonia--      Imaging: GASTROENTEROLOGY INITIAL CONSULT NOTE    Chief Complaint:  Patient is a 83y old  Male who presents with a chief complaint of abdominal pain (04 Oct 2018 02:12)      HPI: 83y Male with past medical history CAD s/p PCI (on ASA/Plavix), HTN, HLD, BPH, prostate CA, DM2 who presented with abdominal pain. Patient reported about one month of diffuse, crampy lower abdominal pain. The pain is always present but sometimes is more severe; there is no association with PO intake or passage of a bowel movement.  Patient also notes an "upset" stomach, decreased PO intake with anorexia, and frequent nausea with occasional vomiting. His recent diet has consisted primarily of juice due to difficulty swallowing solid food; he states solids don't "make it" to his stomach and often result in regurgitation.  Patient believes he has lost about 35 pounds over the last 2 months.. Patient denies melena or hematochezia; during this time his bowel movements have been loose and "green". His last colonoscopy was over 10 years ago; he has previously had polpys on prior colonoscopies. He does not have a family history of colon cancer, gastric cancer, pancreatic cancer, colon polyps, or IBD. He denies history of alcohol or tobacco use.     Allergies:  No Known Allergies      Hospital Medications:      PMHX/PSHX:  Prostate cancer  Stented coronary artery  MI (myocardial infarction)  CAD (coronary artery disease)  HLD (hyperlipidemia)  GERD (gastroesophageal reflux disease)  Benign prostatic hypertrophy  Diabetes  HTN (hypertension)  No significant past surgical history      Family history:  No pertinent family history in first degree relatives      Social History:     ROS:     General:  No wt loss, fevers, chills, night sweats, fatigue,   Eyes:  Good vision, no reported pain  ENT:  No sore throat, pain, runny nose, dysphagia  CV:  No pain, palpitations, hypo/hypertension  Resp:  No dyspnea, cough, tachypnea, wheezing  GI:  see HPI  :  No pain, bleeding, incontinence, nocturia  Muscle:  No pain, weakness  Neuro:  No weakness, tingling, memory problems  Psych:  No fatigue, insomnia, mood problems, depression  Endocrine:  No polyuria, polydipsia, cold/heat intolerance  Heme:  No petechiae, ecchymosis, easy bruisability  Skin:  No rash, tattoos, scars, edema      PHYSICAL EXAM:   Vital Signs:  Vital Signs Last 24 Hrs  T(C): 36.4 (04 Oct 2018 05:11), Max: 37 (03 Oct 2018 15:19)  T(F): 97.5 (04 Oct 2018 05:11), Max: 98.6 (03 Oct 2018 15:19)  HR: 60 (04 Oct 2018 05:11) (60 - 76)  BP: 112/52 (04 Oct 2018 05:11) (95/52 - 136/53)  BP(mean): --  RR: 18 (04 Oct 2018 05:11) (16 - 72)  SpO2: 96% (04 Oct 2018 05:11) (95% - 100%)  Daily Height in cm: 153.92 (03 Oct 2018 23:59)    Daily Weight in k (04 Oct 2018 05:11)    GENERAL:  no acute distress  HEENT:  -icterus   CHEST:  clear bilaterally, no wheezes or rales  HEART:  RRR, S1S2  ABDOMEN:  soft, non-tender, non-distended, normoactive bowel sounds,  no hepato-splenomegaly  EXTEREMITIES:  no  edema  SKIN:  No rash/erythema/ecchymoses/petechiae/wounds/abscess/warm/dry  NEURO:  alert, oriented, conversant     LABS:                        9.1    5.57  )-----------( 292      ( 03 Oct 2018 16:00 )             29.4     10-03    138  |  97<L>  |  23  ----------------------------<  85  4.1   |  25  |  1.24    Ca    9.4      03 Oct 2018 16:00    TPro  7.0  /  Alb  3.6  /  TBili  0.4  /  DBili  x   /  AST  13  /  ALT  7   /  AlkPhos  55  10-03    LIVER FUNCTIONS - ( 03 Oct 2018 16:00 )  Alb: 3.6 g/dL / Pro: 7.0 g/dL / ALK PHOS: 55 u/L / ALT: 7 u/L / AST: 13 u/L / GGT: x           PT/INR - ( 03 Oct 2018 16:00 )   PT: 12.6 SEC;   INR: 1.09          PTT - ( 03 Oct 2018 16:00 )  PTT:29.3 SEC  Amylase Serum--      Lipase serum9.7       Ammonia--      Imaging:  < from: CT Abdomen and Pelvis w/ Oral Cont and w/ IV Cont (10.03.18 @ 20:14) >  FINDINGS:    LOWER CHEST: Within normal limits.    LIVER: Small focus of hyperattenuation along the falciform ligament,   likely focal fatty infiltration.  BILE DUCTS: Normal caliber.  GALLBLADDER: Within normal limits.  SPLEEN: Within normal limits.  PANCREAS: Within normal limits.  ADRENALS: Within normal limits.  KIDNEYS/URETERS: Subcentimeter hypodense focus in the left upper pole,   too small to characterize. No hydronephrosis. Symmetric enhancement.     BLADDER: Within normal limits.  REPRODUCTIVE ORGANS: Prostatic fiducial markers/radiation seeds.    BOWEL: Cecal mural thickening and pericecal inflammatory changes with   adjacent mesenteric lymphadenopathy. Reference right common iliac lymph   node measures 2.6 x 2.0 cm (series 2 image 68). No bowel obstruction.   PERITONEUM: No ascites.  VESSELS:  Atherosclerosis.  RETROPERITONEUM: Paracaval lymphadenopathy.   ABDOMINAL WALL: Within normal limits.  BONES: Multilevel spinal degenerative changes.    IMPRESSION:   Cecal mass. Adjacent adenopathy concerning for metastasis.    < end of copied text >

## 2018-10-04 NOTE — PROGRESS NOTE ADULT - PROBLEM SELECTOR PLAN 9
Hx of HTN   BP currently at goal  Needs med rec in AM Hx of HTN;   Pending med reconcilliation; will restart home meds prn DVT PPX: SCDS  Diet: CLD: consistent carbs  Dispo: pending   PT consult

## 2018-10-04 NOTE — CONSULT NOTE ADULT - ASSESSMENT
Impression:  1) Lower abdominal pain, change in bowel habits, unintentional weight loss: concerning for colorectal cancer especially in the setting of CT showing cecal mass.   2) Dysphagia  3) CAD s/p PCI on ASA/Plavix    Recommendations:   - EGD and colonoscopy tomorrow   - clear liquid diet; NPO past midnight   - prep starts at 6pm, ordered by GI   - continue to hold Plavix; clarify with cardiology need for DAPT given last stent placed >10 years ago  - serial CBCs; transfuse to Hgb > 7 Impression:  1) Lower abdominal pain, anorexia, unintentional weight loss: concerning for colorectal cancer especially in the setting of CT showing cecal mass.   2) Dysphagia to solids > liquids: concern for structural etiology (esophageal mass, web, ring, stricture)    3) CAD s/p PCI on ASA/Plavix  4) Microcytic anemia - likely secondary to occult blood loss from malignancy as above     Recommendations:   - EGD and colonoscopy tomorrow   - clear liquid diet; NPO past midnight   - prep starts at 6pm, ordered by GI   - continue to hold Plavix; clarify with cardiology need for DAPT given last stent placed >10 years ago  - serial CBCs; transfuse to Hgb > 7  - send iron studies (iron, TIBC, ferritin, transferrin), folate, TSH, reticulocyte count

## 2018-10-04 NOTE — PROGRESS NOTE ADULT - PROBLEM SELECTOR PLAN 5
Patient with anemia, MCV 73, likely iron deficiency anemia  F/u Iron studies  FOCT+ Patient with anemia, MCV 73, likely iron deficiency anemia vs AOCD in setting of new found cecal mass; FOBT +  F/u Iron studies Hx of prostate cancer s/p SEEDing and RT. Prostate cancer mets not likely source of cecal mass.

## 2018-10-04 NOTE — PROGRESS NOTE ADULT - PROBLEM SELECTOR PLAN 2
Patient with noted dysphagia w/ solids, tolerates liquid   May be 2/2 iron deficiency, will send iron studies AM  CLD for now, advance as tolerated  Unlikely to be 2/2 mass vs. esophageal web, rarely mets to esophagus   GI consult in AM for possible endoscopy Patient with noted dysphagia w/ solids, tolerates liquid. 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 GI following, pending EGD tomorrow.  - F/u GI recs

## 2018-10-04 NOTE — H&P ADULT - PROBLEM SELECTOR PLAN 7
Continue tamsulosin home medication  Needs med rec in AM Hx of obstructive CAD w/ stent in the past  Needs Med Rec   No active issues

## 2018-10-04 NOTE — H&P ADULT - PROBLEM SELECTOR PLAN 3
Patient w/ FOCT+, last colonoscopy >10 years ago, polyps reportedly nonmalignant   Plan as above  Active T+S

## 2018-10-04 NOTE — H&P ADULT - HISTORY OF PRESENT ILLNESS
83M with PMHx of BPH, Prostate CA (s/p seeding and RT), GERD, HLD, DM2, CAD s/p stent (12 years ago) presenting with abdominal pain x 1 month. Patient reports he has been having dull, cramping lower abdominal pain for the past one month, intermittent, 4/10. Patient reports he has associated nausea constant with decreased PO intake. He notes a 25 lb weight loss in last 1 month, reports previous jc 185 to 165 lbs. He endorses episodes of vomiting intermittent for the past month as well, NBNB. He also endorses he has been chewing his food and spitting it out due to feeling of food getting stuck in his esophagus. He tolerates liquids well. Patient's LBM was this AM, endorses loose, brown, but occasionally "black". He reports two prior colposcopies s/p polyps resected, per patient never told cancerous. His last colonoscopy >10 years ago. Patient is a never smoker. No hx of malignancy in the family. He denies chest pain, SOB, dysuria hematuria.    In ED: 83M with PMHx of BPH, Prostate CA (s/p seeding and RT), GERD, HLD, DM2, CAD s/p stent (12 years ago) presenting with abdominal pain x 1 month. Patient reports he has been having dull, cramping lower abdominal pain for the past one month, intermittent, 4/10. Patient reports he has associated nausea constant with decreased PO intake. He notes a 25 lb weight loss in last 1 month, reports previous jc 185 to 165 lbs. He endorses episodes of vomiting intermittent for the past month as well, NBNB. He also endorses he has been chewing his food and spitting it out due to feeling of food getting stuck in his esophagus. He tolerates liquids well. Patient's LBM was this AM, endorses loose, brown, but occasionally "black". He reports two prior colposcopies s/p polyps resected, per patient never told cancerous. His last colonoscopy >10 years ago. Patient is a never smoker. No hx of malignancy in the family. He denies chest pain, SOB, dysuria hematuria.    In ED: 108/64 T98 RR71 RR18 O2 95%RA  Patient received NS 1L IV x 1, famotidine 20 mg IV x 1

## 2018-10-04 NOTE — H&P ADULT - PROBLEM SELECTOR PLAN 2
Patient with noted dysphagia w/ solids, tolerates liquid   May be 2/2 iron deficiency, will send iron studies AM  CLD for now, advance as tolerated Patient with noted dysphagia w/ solids, tolerates liquid   May be 2/2 iron deficiency, will send iron studies AM  CLD for now, advance as tolerated  Unlikely to be 2/2 mass vs. esophageal web, rarely mets to esophagus   GI consult in AM for possible endoscopy Patient with noted dysphagia w/ solids, tolerates liquid   May be 2/2 iron deficiency (ie esophageal webs can be associated with MANI), will send iron studies AM  CLD for now, advance as tolerated  Unlikely to be 2/2 mass vs. esophageal web, rarely mets to esophagus   GI consult in AM for possible endoscopy Patient with noted dysphagia w/ solids, tolerates liquid   May be 2/2 iron deficiency (ie esophageal webs can be associated with MANI), will send iron studies AM  CLD for now, advance as tolerated  Unlikely to be 2/2 mets;  mass vs. esophageal web, rarely mets to esophagus   GI consult in AM for possible endoscopy

## 2018-10-04 NOTE — PROGRESS NOTE ADULT - PROBLEM SELECTOR PLAN 3
Patient w/ FOCT+, last colonoscopy >10 years ago, polyps reportedly nonmalignant   Plan as above  Active T+S Patient with FTT, 30 lb weight loss in setting of new cecal mass concerning for malignancy   - f/u Nutrition recs

## 2018-10-04 NOTE — H&P ADULT - PROBLEM SELECTOR PLAN 9
DVT PPX: SCDS  Diet: CLD  Dispo: pending   PT consult Hx of HTN   BP currently at goal  Needs med rec in AM

## 2018-10-04 NOTE — H&P ADULT - NSHPPHYSICALEXAM_GEN_ALL_CORE
· Constitutional      thin, cachetic man  lying in bed comfortably. No acute distress  · HEENT	               PERRL; EOMI; conjunctiva clear  · Neck	               Supple; no JVD  · Back	                ROM intact  · Respiratory             good air movement; no rales; no rhonchi; no wheezes  · Cardiovascular       S1 & S2 with RRR; no murmurs, rales or JVD  · Gastrointestinal     soft; no distention; bowel sounds normal; no rigidity  · Extremities             no pedal edema  · Vascular	               Radial Pulse: right normal; left normal  · Neurological           alert and oriented x 3; responds to verbal commands; sensation intact; cranial nerves intact; strength normal  · Skin	               warm and dry  · Musculoskeletal    no calf tenderness; diminished strength  · Psychiatric              normal mood and affect · Constitutional      thin, cachetic man  lying in bed comfortably. No acute distress  · HEENT	               PERRL; EOMI; conjunctiva clear, no pallor noted   · Neck	               Supple; no JVD  · Back	                ROM intact  · Respiratory             good air movement; no rales; no rhonchi; no wheezes  · Cardiovascular       S1 & S2 with RRR; no murmurs, rales or JVD  · Gastrointestinal     soft; no distention; bowel sounds normal; no rigidity  · Extremities             no pedal edema  · Vascular	               Radial Pulse: right normal; left normal  · Neurological           alert and oriented x 3; responds to verbal commands; sensation intact; cranial nerves intact; strength normal  · Skin	               warm and dry  · Musculoskeletal    no calf tenderness; diminished strength  · Psychiatric              normal mood and affect

## 2018-10-04 NOTE — CONSULT NOTE ADULT - ATTENDING COMMENTS
I have seen and examined this patient with the GI fellow. Agree with above.    Zofia Duckworth MD  GI Attending, Faculty [facility]  Office: [phone]

## 2018-10-04 NOTE — PROGRESS NOTE ADULT - PROBLEM SELECTOR PLAN 8
Continue tamsulosin home medication  Needs med rec in AM Continue tamsulosin home medication  - Will follow up med reconciliation Hx of HTN;   Pending med reconciliation; will restart home meds prn

## 2018-10-04 NOTE — PROGRESS NOTE ADULT - PROBLEM SELECTOR PROBLEM 7
Coronary artery disease involving native coronary artery of native heart, angina presence unspecified Benign prostatic hypertrophy

## 2018-10-04 NOTE — PROGRESS NOTE ADULT - PROBLEM SELECTOR PLAN 1
Patient with cecal mass with associated lymphadenopathy seen on CTAP concerning for malignancy  Unlikely to be 2/2 prostate cancer, likely primary colon malignancy   FOCT +, last colonoscopy >10 years ago  GI consult in AM for likely colonoscopy Patient with cecal mass with associated lymphadenopathy seen on CT A/P concerning for malignancy  Unlikely to be 2/2 prostate cancer, likely primary colon malignancy   FOCT +, last colonoscopy >10 years ago  GI consult in AM for likely colonoscopy\  - F/U GI recs

## 2018-10-04 NOTE — H&P ADULT - PROBLEM SELECTOR PROBLEM 7
Benign prostatic hypertrophy Coronary artery disease involving native coronary artery of native heart, angina presence unspecified

## 2018-10-04 NOTE — H&P ADULT - PROBLEM SELECTOR PLAN 8
Hx of HTN   BP currently at goal  Needs med rec in AM Continue tamsulosin home medication  Needs med rec in AM

## 2018-10-04 NOTE — PROGRESS NOTE ADULT - PROBLEM SELECTOR PLAN 7
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   - Will hold DAPT for pending endoscopic procedures.   - Will f/u with med reconciliation Continue tamsulosin home medication  - Will follow up med reconciliation

## 2018-10-04 NOTE — H&P ADULT - PROBLEM SELECTOR PROBLEM 6
Coronary artery disease involving native coronary artery of native heart, angina presence unspecified Prostate cancer

## 2018-10-04 NOTE — PROGRESS NOTE ADULT - SUBJECTIVE AND OBJECTIVE BOX
Shivam Lua MD  PGY 1 Department of Internal Medicine  Pager: [phone]    Patient is a 83y old  Male who presents with a chief complaint of abdominal pain (04 Oct 2018 07:48)      SUBJECTIVE / OVERNIGHT EVENTS:    MEDICATIONS  (STANDING):    MEDICATIONS  (PRN):      I&O's Summary      Vital Signs Last 24 Hrs  T(C): 36.4 (04 Oct 2018 05:11), Max: 37 (03 Oct 2018 15:19)  T(F): 97.5 (04 Oct 2018 05:11), Max: 98.6 (03 Oct 2018 15:19)  HR: 60 (04 Oct 2018 05:11) (60 - 76)  BP: 112/52 (04 Oct 2018 05:11) (95/52 - 136/53)  BP(mean): --  RR: 18 (04 Oct 2018 05:11) (16 - 72)  SpO2: 96% (04 Oct 2018 05:11) (95% - 100%)    CAPILLARY BLOOD GLUCOSE      POCT Blood Glucose.: 221 mg/dL (04 Oct 2018 00:30)      PHYSICAL EXAM:  GENERAL: NAD,   HEAD:  Atraumatic, Normocephalic  EYES: EOMI, PERRL, conjunctiva and sclera clear  NECK: No JVD  CHEST/LUNG: Clear to auscultation bilaterally; No wheeze  HEART: Regular rate and rhythm; No murmurs, rubs, or gallops  ABDOMEN: Soft, Nontender, Nondistended; Bowel sounds present  EXTREMITIES:  2+ Peripheral Pulses, No clubbing, cyanosis, or edema  PSYCH: AAOx3  NEUROLOGY: non-focal  SKIN: No rashes or lesions    LABS:                        9.1    5.57  )-----------( 292      ( 03 Oct 2018 16:00 )             29.4     Auto Eosinophil # 0.08  / Auto Eosinophil % 1.4   / Auto Neutrophil # 2.77  / Auto Neutrophil % 49.7  / BANDS % x        10-03    138  |  97<L>  |  23  ----------------------------<  85  4.1   |  25  |  1.24    Ca    9.4      03 Oct 2018 16:00  TPro  7.0  /  Alb  3.6  /  TBili  0.4  /  DBili  x   /  AST  13  /  ALT  7   /  AlkPhos  55  10-03    PT/INR - ( 03 Oct 2018 16:00 )   PT: 12.6 SEC;   INR: 1.09          PTT - ( 03 Oct 2018 16:00 )  PTT:29.3 SEC      Urinalysis Basic - ( 04 Oct 2018 06:46 )    Color: YELLOW / Appearance: CLEAR / S.050 / pH: 6.0  Gluc: NEGATIVE / Ketone: TRACE  / Bili: NEGATIVE / Urobili: NORMAL   Blood: NEGATIVE / Protein: 20 / Nitrite: NEGATIVE   Leuk Esterase: NEGATIVE / RBC: 0-2 / WBC 0-2   Sq Epi: OCC / Non Sq Epi: x / Bacteria: NEGATIVE            RADIOLOGY & ADDITIONAL TESTS:    Imaging Personally Reviewed:    Consultant(s) Notes Reviewed:      Care Discussed with Consultants/Other Providers: Shivam Lua MD  PGY 1 Department of Internal Medicine  Pager: [phone]    Patient is a 83y old  Male who presents with a chief complaint of abdominal pain (04 Oct 2018 08:02)      SUBJECTIVE / OVERNIGHT EVENTS: No acute overnight events Pt reported that he has continued diffuse 4/10 non radiating abdominal pain. Pt reported he last had a watery, nonbloody, yellowish BM yesterday. Pt reported no N/V/D, CP, SOB, or dysuria. Pt reported continued difficulty swallowing solid foods.     MEDICATIONS  (STANDING):  dextrose 5%. 1000 milliLiter(s) (50 mL/Hr) IV Continuous <Continuous>  dextrose 50% Injectable 12.5 Gram(s) IV Push once  dextrose 50% Injectable 25 Gram(s) IV Push once  dextrose 50% Injectable 25 Gram(s) IV Push once  insulin lispro (HumaLOG) corrective regimen sliding scale   SubCutaneous three times a day before meals  insulin lispro (HumaLOG) corrective regimen sliding scale   SubCutaneous at bedtime  polyethylene glycol/electrolyte Solution 1000 milliLiter(s) Oral every 4 hours    MEDICATIONS  (PRN):  dextrose 40% Gel 15 Gram(s) Oral once PRN Blood Glucose LESS THAN 70 milliGRAM(s)/deciliter  glucagon  Injectable 1 milliGRAM(s) IntraMuscular once PRN Glucose LESS THAN 70 milligrams/deciliter      I&O's Summary      Vital Signs Last 24 Hrs  T(C): 36.4 (04 Oct 2018 05:11), Max: 37 (03 Oct 2018 15:19)  T(F): 97.5 (04 Oct 2018 05:11), Max: 98.6 (03 Oct 2018 15:19)  HR: 60 (04 Oct 2018 05:11) (60 - 76)  BP: 112/52 (04 Oct 2018 05:11) (95/52 - 136/53)  BP(mean): --  RR: 18 (04 Oct 2018 05:11) (16 - 72)  SpO2: 96% (04 Oct 2018 05:11) (95% - 100%)    CAPILLARY BLOOD GLUCOSE      POCT Blood Glucose.: 297 mg/dL (04 Oct 2018 12:35)  POCT Blood Glucose.: 221 mg/dL (04 Oct 2018 00:30)      PHYSICAL EXAM:  GENERAL: NAD,   HEAD:  Atraumatic, Normocephalic  EYES: EOMI, PERRL, conjunctiva and sclera clear  NECK: No JVD  CHEST/LUNG: Clear to auscultation bilaterally; No wheeze  HEART: Regular rate and rhythm; No murmurs, rubs, or gallops  ABDOMEN: Soft, diffuse tenderness in all four quadrants on palplation. Nondistended; Bowel sounds present  EXTREMITIES:  2+ Peripheral Pulses, No clubbing, cyanosis, or edema  PSYCH: AAOx3  NEUROLOGY: non-focal  SKIN: No rashes or lesions    LABS:                        10.1   5.54  )-----------( 300      ( 04 Oct 2018 08:00 )             33.4     Auto Eosinophil # x     / Auto Eosinophil % x     / Auto Neutrophil # x     / Auto Neutrophil % x     / BANDS % x                            9.1    5.57  )-----------( 292      ( 03 Oct 2018 16:00 )             29.4     Auto Eosinophil # 0.08  / Auto Eosinophil % 1.4   / Auto Neutrophil # 2.77  / Auto Neutrophil % 49.7  / BANDS % x        10-04    137  |  98  |  17  ----------------------------<  159<H>  4.1   |  25  |  0.90  10-03    138  |  97<L>  |  23  ----------------------------<  85  4.1   |  25  |  1.24    Ca    9.8      04 Oct 2018 08:00  TPro  7.9  /  Alb  4.0  /  TBili  0.5  /  DBili  x   /  AST  13  /  ALT  7   /  AlkPhos  65  10-04  TPro  7.0  /  Alb  3.6  /  TBili  0.4  /  DBili  x   /  AST  13  /  ALT  7   /  AlkPhos  55  10-03    PT/INR - ( 03 Oct 2018 16:00 )   PT: 12.6 SEC;   INR: 1.09          PTT - ( 03 Oct 2018 16:00 )  PTT:29.3 SEC      Urinalysis Basic - ( 04 Oct 2018 06:46 )    Color: YELLOW / Appearance: CLEAR / S.050 / pH: 6.0  Gluc: NEGATIVE / Ketone: TRACE  / Bili: NEGATIVE / Urobili: NORMAL   Blood: NEGATIVE / Protein: 20 / Nitrite: NEGATIVE   Leuk Esterase: NEGATIVE / RBC: 0-2 / WBC 0-2   Sq Epi: OCC / Non Sq Epi: x / Bacteria: NEGATIVE            RADIOLOGY & ADDITIONAL TESTS:    Imaging Personally Reviewed: yes    Consultant(s) Notes Reviewed:  yes

## 2018-10-04 NOTE — PROGRESS NOTE ADULT - PROBLEM SELECTOR PROBLEM 6
Prostate cancer Coronary artery disease involving native coronary artery of native heart, angina presence unspecified

## 2018-10-04 NOTE — H&P ADULT - PROBLEM SELECTOR PLAN 5
Hx of prostate cancer s/p SEEDing and RT   Plan as above Patient with anemia, MCV 73, likely iron deficiency anemia  F/u Iron studies  FOCT+

## 2018-10-04 NOTE — PROGRESS NOTE ADULT - PROBLEM SELECTOR PLAN 10
DVT PPX: SCDS  Diet: CLD  Dispo: pending   PT consult DVT PPX: SCDS  Diet: CLD: consisten carbs  Dispo: pending   PT consult

## 2018-10-04 NOTE — PROGRESS NOTE ADULT - PROBLEM SELECTOR PLAN 6
Hx of prostate cancer s/p SEEDing and RT   Plan as above Hx of prostate cancer s/p SEEDing and RT. Prostate cancer mets not likely source of cecal mass. Hx of obstructive CAD w/ stent in the past.   - Will hold DAPT for pending endoscopic procedures.   - Will f/u with med reconciliation

## 2018-10-04 NOTE — H&P ADULT - PROBLEM SELECTOR PLAN 1
Patient with cecal mass with associated lymphadenopathy seen on CTAP concerning for malignancy  FOCT +, last colonoscopy >10 years ago  GI consult in AM for likely colonoscopy Patient with cecal mass with associated lymphadenopathy seen on CTAP concerning for malignancy  Unlikely to be 2/2 prostate cancer, likely primary colon malignancy   FOCT +, last colonoscopy >10 years ago  GI consult in AM for likely colonoscopy

## 2018-10-04 NOTE — H&P ADULT - ATTENDING COMMENTS
82 yo m with abd pain in setting of cecal mass; also with esophageal dysphagia (able to tolerate liquids but not solids); prior h/o prostate ca s/p RT. H/o cad on dapt, remote cardiac stent.   -gi eval pending for egd and colonoscopy  -hold dapt for now, scd for dvt ppx  -med rec pending

## 2018-10-04 NOTE — PROGRESS NOTE ADULT - PROBLEM SELECTOR PLAN 4
Patient with FTT, 30 lb weight loss   Concern for malignancy   Nutrition consult Patient with FTT, 30 lb weight loss in setting of new cecal mass concerning for malignancy   - f/u Nutrition recs Patient with anemia, MCV 73, likely iron deficiency anemia vs AOCD in setting of new found cecal mass; FOBT +  F/u Iron studies

## 2018-10-05 ENCOUNTER — TRANSCRIPTION ENCOUNTER (OUTPATIENT)
Age: 83
End: 2018-10-05

## 2018-10-05 ENCOUNTER — RESULT REVIEW (OUTPATIENT)
Age: 83
End: 2018-10-05

## 2018-10-05 VITALS
TEMPERATURE: 98 F | OXYGEN SATURATION: 98 % | RESPIRATION RATE: 16 BRPM | DIASTOLIC BLOOD PRESSURE: 76 MMHG | SYSTOLIC BLOOD PRESSURE: 130 MMHG | HEART RATE: 86 BPM

## 2018-10-05 DIAGNOSIS — E11.9 TYPE 2 DIABETES MELLITUS WITHOUT COMPLICATIONS: ICD-10-CM

## 2018-10-05 LAB
BUN SERPL-MCNC: 10 MG/DL — SIGNIFICANT CHANGE UP (ref 7–23)
CALCIUM SERPL-MCNC: 9.2 MG/DL — SIGNIFICANT CHANGE UP (ref 8.4–10.5)
CHLORIDE SERPL-SCNC: 99 MMOL/L — SIGNIFICANT CHANGE UP (ref 98–107)
CO2 SERPL-SCNC: 23 MMOL/L — SIGNIFICANT CHANGE UP (ref 22–31)
CREAT SERPL-MCNC: 0.79 MG/DL — SIGNIFICANT CHANGE UP (ref 0.5–1.3)
FERRITIN SERPL-MCNC: 168.4 NG/ML — SIGNIFICANT CHANGE UP (ref 30–400)
GLUCOSE BLDC GLUCOMTR-MCNC: 209 MG/DL — HIGH (ref 70–99)
GLUCOSE BLDC GLUCOMTR-MCNC: 212 MG/DL — HIGH (ref 70–99)
GLUCOSE BLDC GLUCOMTR-MCNC: 216 MG/DL — HIGH (ref 70–99)
GLUCOSE SERPL-MCNC: 216 MG/DL — HIGH (ref 70–99)
HBA1C BLD-MCNC: 6.8 % — HIGH (ref 4–5.6)
HCT VFR BLD CALC: 33.3 % — LOW (ref 39–50)
HGB BLD-MCNC: 10.3 G/DL — LOW (ref 13–17)
IRON SATN MFR SERPL: 20 UG/DL — LOW (ref 45–165)
IRON SATN MFR SERPL: 215 UG/DL — SIGNIFICANT CHANGE UP (ref 155–535)
MAGNESIUM SERPL-MCNC: 1.6 MG/DL — SIGNIFICANT CHANGE UP (ref 1.6–2.6)
MCHC RBC-ENTMCNC: 23.1 PG — LOW (ref 27–34)
MCHC RBC-ENTMCNC: 30.9 % — LOW (ref 32–36)
MCV RBC AUTO: 74.7 FL — LOW (ref 80–100)
NRBC # FLD: 0 — SIGNIFICANT CHANGE UP
PHOSPHATE SERPL-MCNC: 2.3 MG/DL — LOW (ref 2.5–4.5)
PLATELET # BLD AUTO: 292 K/UL — SIGNIFICANT CHANGE UP (ref 150–400)
PMV BLD: 9.1 FL — SIGNIFICANT CHANGE UP (ref 7–13)
POTASSIUM SERPL-MCNC: 3.9 MMOL/L — SIGNIFICANT CHANGE UP (ref 3.5–5.3)
POTASSIUM SERPL-SCNC: 3.9 MMOL/L — SIGNIFICANT CHANGE UP (ref 3.5–5.3)
RBC # BLD: 4.46 M/UL — SIGNIFICANT CHANGE UP (ref 4.2–5.8)
RBC # FLD: 17.1 % — HIGH (ref 10.3–14.5)
SODIUM SERPL-SCNC: 138 MMOL/L — SIGNIFICANT CHANGE UP (ref 135–145)
UIBC SERPL-MCNC: 194.5 UG/DL — SIGNIFICANT CHANGE UP (ref 110–370)
WBC # BLD: 5.35 K/UL — SIGNIFICANT CHANGE UP (ref 3.8–10.5)
WBC # FLD AUTO: 5.35 K/UL — SIGNIFICANT CHANGE UP (ref 3.8–10.5)

## 2018-10-05 PROCEDURE — 45380 COLONOSCOPY AND BIOPSY: CPT | Mod: GC

## 2018-10-05 PROCEDURE — 43239 EGD BIOPSY SINGLE/MULTIPLE: CPT | Mod: GC

## 2018-10-05 PROCEDURE — 99223 1ST HOSP IP/OBS HIGH 75: CPT

## 2018-10-05 PROCEDURE — 88312 SPECIAL STAINS GROUP 1: CPT | Mod: 26

## 2018-10-05 PROCEDURE — 88305 TISSUE EXAM BY PATHOLOGIST: CPT | Mod: 26

## 2018-10-05 PROCEDURE — 99239 HOSP IP/OBS DSCHRG MGMT >30: CPT

## 2018-10-05 RX ORDER — RANITIDINE HYDROCHLORIDE 150 MG/1
1 TABLET, FILM COATED ORAL
Qty: 0 | Refills: 0 | COMMUNITY

## 2018-10-05 RX ORDER — CLOPIDOGREL BISULFATE 75 MG/1
1 TABLET, FILM COATED ORAL
Qty: 0 | Refills: 0 | COMMUNITY

## 2018-10-05 RX ORDER — INSULIN LISPRO 100/ML
3 VIAL (ML) SUBCUTANEOUS
Qty: 0 | Refills: 0 | Status: DISCONTINUED | OUTPATIENT
Start: 2018-10-05 | End: 2018-10-05

## 2018-10-05 RX ORDER — ATENOLOL 25 MG/1
50 TABLET ORAL DAILY
Qty: 0 | Refills: 0 | Status: DISCONTINUED | OUTPATIENT
Start: 2018-10-05 | End: 2018-10-05

## 2018-10-05 RX ORDER — ASPIRIN/CALCIUM CARB/MAGNESIUM 324 MG
1 TABLET ORAL
Qty: 0 | Refills: 0 | COMMUNITY

## 2018-10-05 RX ORDER — INSULIN GLARGINE 100 [IU]/ML
7 INJECTION, SOLUTION SUBCUTANEOUS AT BEDTIME
Qty: 0 | Refills: 0 | Status: DISCONTINUED | OUTPATIENT
Start: 2018-10-05 | End: 2018-10-05

## 2018-10-05 RX ORDER — OMEPRAZOLE 10 MG/1
1 CAPSULE, DELAYED RELEASE ORAL
Qty: 60 | Refills: 0 | OUTPATIENT
Start: 2018-10-05 | End: 2018-11-03

## 2018-10-05 RX ORDER — PANTOPRAZOLE SODIUM 20 MG/1
40 TABLET, DELAYED RELEASE ORAL EVERY 12 HOURS
Qty: 0 | Refills: 0 | Status: DISCONTINUED | OUTPATIENT
Start: 2018-10-05 | End: 2018-10-05

## 2018-10-05 RX ADMIN — PANTOPRAZOLE SODIUM 40 MILLIGRAM(S): 20 TABLET, DELAYED RELEASE ORAL at 17:07

## 2018-10-05 RX ADMIN — Medication 2: at 17:05

## 2018-10-05 RX ADMIN — PANTOPRAZOLE SODIUM 40 MILLIGRAM(S): 20 TABLET, DELAYED RELEASE ORAL at 05:00

## 2018-10-05 RX ADMIN — Medication 3 UNIT(S): at 17:05

## 2018-10-05 RX ADMIN — Medication 2: at 13:22

## 2018-10-05 NOTE — DISCHARGE NOTE ADULT - HOSPITAL COURSE
83M with PMHx of BPH, Prostate CA (s/p seeding and RT), GERD, HLD, DM2, CAD s/p stent (12 years ago) presenting with abdominal pain and dysphagia for about 1 month w/ associated melena. In ED: 108/64 T98 RR71 RR18 O2 95%RA; Patient received NS 1L IV x 1, famotidine 20 mg IV x 1. Cecal mass was found on admission on CT A/P. Pt had EGD and Colonoscopy performed. EGD reveal dilated esophagus (biopsies sent), 3cm Hill grade III hiatal hernia, and one nonbleeding gastric ulcer with no bleeding stigmata. Colonoscopy reveal non-bleeding internal hemorrhoids and cecal mass (biopsies sent). Hospital course was uncomplicated. Pt tolerated PO soon after procedure.     Pt examined and deemed stable to be discharged home.

## 2018-10-05 NOTE — PROVIDER CONTACT NOTE (OTHER) - ASSESSMENT
Patient said he has been moving his bowels constantly after the first moviprep, doesn't feel that is necessary for the second one

## 2018-10-05 NOTE — DISCHARGE NOTE ADULT - CARE PROVIDERS DIRECT ADDRESSES
,[email or web address],DirectAddress_Unknown

## 2018-10-05 NOTE — DISCHARGE NOTE ADULT - PLAN OF CARE
Follow up You were found to have a mass in your gastrointestinal tract. You had a colonoscopy and biopspies of the mass were taken. Please follow up with the Surgical Oncologist to evaluate and plan for the next steps in your management. You were found to have trouble swallowing solid foods. You had an endoscopic proceudre where your esophagus, stomach, and first portion of your small intestine were evaluated. Please follow up with your gastroenterologist within one week of discharge.

## 2018-10-05 NOTE — DISCHARGE NOTE ADULT - INSTRUCTIONS
Please continue to eat a low sodium, high fiber diet. Please take precaution when eating solid foods. We advice you first start with a primarily liquid diet. Avoid fruit juices and supplement containing large amounts of sugar. If possible puree your meals initially and then increase to solid foods slowly.

## 2018-10-05 NOTE — DISCHARGE NOTE ADULT - CARE PROVIDER_API CALL
Zofia Duckworth), Gastroenterology; Internal Medicine  [address] [place] [address]  Phone: [phone]  Fax: [phone]    Melo Leon), Surgery  [address] [place] [id number]  Phone: [phone]  Fax: [phone] Zofia Duckworth), Gastroenterology; Internal Medicine  [address] [address]  Phone: [phone]  Fax: [phone]    Melo Leon), Surgery  [address] [place] [address]  Phone: [phone]  Fax: [phone]    MD Jeancarlos Mando  Phone: [phone]  Fax: (       -

## 2018-10-05 NOTE — CONSULT NOTE ADULT - SUBJECTIVE AND OBJECTIVE BOX
Patient seen and examined, full note to follow.     Brianda Martinez, PGY-2  Surgery pager [phone] 83M w/ prostate cancer (s/p rads, seeding), BPH, GERD, DM, CAD s/p stent (2006), now presenting with abdominal pain x1 month. Patient states that he has been having a constant dull, cramping lower abdominal pain that never remits for the past month. The pain is worse with eating and with bowel movements. He has lost about 30-40 lbs within the last month, which he attributed to decreased appetite from pain and nausea. 83M w/ prostate cancer (s/p rads, seeding), BPH, GERD, DM, CAD s/p stent (), now presenting with abdominal pain x1 month. Patient states that he has been having a constant dull, cramping lower abdominal pain that never remits for the past month. The pain is worse with eating and with bowel movements. He has lost about 30-40 lbs within the last month, which he attributed to decreased appetite from pain and nausea. He also reports that he has had occasional black stools but typically loose/fluid in the last month. He has had a previous colonoscopy, last more than ten years ago.     On this admission, patient had a CT scan with a cecal mass noted concerning for malignancy. Patient also had an endoscopy (to evaluate food intolerance) and colonoscopy w/ large ulcerated non obstructing mass in the cecum, partially circumferential. Biopsies pending.     HPI:  83M with PMHx of BPH, Prostate CA (s/p seeding and RT), GERD, HLD, DM2, CAD s/p stent (12 years ago) presenting with abdominal pain x 1 month. Patient reports he has been having dull, cramping lower abdominal pain for the past one month, intermittent, 4/10. Patient reports he has associated nausea constant with decreased PO intake. He notes a 25 lb weight loss in last 1 month, reports previous jc 185 to 165 lbs. He endorses episodes of vomiting intermittent for the past month as well, NBNB. He also endorses he has been chewing his food and spitting it out due to feeling of food getting stuck in his esophagus. He tolerates liquids well. Patient's LBM was this AM, endorses loose, brown, but occasionally "black". He reports two prior colposcopies s/p polyps resected, per patient never told cancerous. His last colonoscopy >10 years ago. Patient is a never smoker. No hx of malignancy in the family. He denies chest pain, SOB, dysuria hematuria.    In ED: 108/64 T98 RR71 RR18 O2 95%RA  Patient received NS 1L IV x 1, famotidine 20 mg IV x 1 (04 Oct 2018 02:12)    Allergies:   PAST MEDICAL & SURGICAL HISTORY:  Prostate cancer  Stented coronary artery  MI (myocardial infarction)  CAD (coronary artery disease)  HLD (hyperlipidemia)  GERD (gastroesophageal reflux disease)  Benign prostatic hypertrophy  Diabetes  HTN (hypertension)  No significant past surgical history    FAMILY HISTORY:  No pertinent family history in first degree relatives    SOCIAL HISTORY:      ADVANCE DIRECTIVES: Presumed Full Code      REVIEW OF SYSTEMS:   General: Non-Contributory  Skin/Breast: Non-Contributory  Ophthalmologic: Non-Contributory  ENMT: Non-Contributory  Respiratory and Thorax: Non-Contributory  Cardiovascular: Non-Contributory  Gastrointestinal: Non-Contributory  Genitourinary: Non-Contributory  Musculoskeletal: Non-Contributory  Neurological: Non-Contributory  Psychiatric: Non-Contributory  Hematology/Lymphatics: Non-Contributory  Endocrine: Non-Contributory  Allergic/Immunologic: Non-Contributory    HOME MEDICATIONS:    Home Medications:  atorvastatin 40 mg oral tablet: 1 tab(s) orally once a day (04 Oct 2018 16:00)  felodipine 5 mg oral tablet, extended release: 1 tab(s) orally once a day (04 Oct 2018 16:00)  Flomax 0.4 mg oral capsule: 2 cap(s) orally once a day (04 Oct 2018 16:00)  Lasix 40 mg oral tablet: 1 tab(s) orally once a day (04 Oct 2018 16:00)  metFORMIN 1000 mg oral tablet: 1 tab(s) orally 2 times a day (04 Oct 2018 16:00)  NovoLOG Mix 70/30 FlexPen subcutaneous suspension: 20 unit(s) subcutaneous 2 times a day (04 Oct 2018 16:00)  Tenormin 50 mg oral tablet: 1 tab(s) orally once a day (04 Oct 2018 16:00)  valsartan 320 mg oral tablet: 1 tab(s) orally once a day (04 Oct 2018 16:00)    CURRENT MEDICATIONS:   --------------------------------------------------------------------------------------  Neurologic Medications  morphine  - Injectable 2 milliGRAM(s) IV Push every 4 hours PRN Severe Pain (7 - 10)    Respiratory Medications    Cardiovascular Medications  tamsulosin 0.8 milliGRAM(s) Oral at bedtime    Gastrointestinal Medications  dextrose 5%. 1000 milliLiter(s) IV Continuous <Continuous>  pantoprazole    Tablet 40 milliGRAM(s) Oral every 12 hours    Genitourinary Medications    Hematologic/Oncologic Medications    Antimicrobial/Immunologic Medications    Endocrine/Metabolic Medications  dextrose 40% Gel 15 Gram(s) Oral once PRN Blood Glucose LESS THAN 70 milliGRAM(s)/deciliter  dextrose 50% Injectable 12.5 Gram(s) IV Push once  dextrose 50% Injectable 25 Gram(s) IV Push once  dextrose 50% Injectable 25 Gram(s) IV Push once  glucagon  Injectable 1 milliGRAM(s) IntraMuscular once PRN Glucose LESS THAN 70 milligrams/deciliter  insulin glargine Injectable (LANTUS) 7 Unit(s) SubCutaneous at bedtime  insulin lispro (HumaLOG) corrective regimen sliding scale   SubCutaneous three times a day before meals  insulin lispro (HumaLOG) corrective regimen sliding scale   SubCutaneous at bedtime  insulin lispro Injectable (HumaLOG) 3 Unit(s) SubCutaneous three times a day before meals    Topical/Other Medications    --------------------------------------------------------------------------------------    VITAL SIGNS, INS/OUTS (last 24 hours):  --------------------------------------------------------------------------------------  T(C): 36.6 (10-05-18 @ 13:25), Max: 36.9 (10-04-18 @ 21:48)  HR: 86 (10-05-18 @ 13:25) (85 - 91)  BP: 130/76 (10-05-18 @ 13:25) (113/60 - 131/73)  BP(mean): --  ABP: --  ABP(mean): --  RR: 16 (10-05-18 @ 13:25) (16 - 19)  SpO2: 98% (10-05-18 @ 13:25) (98% - 99%)  Wt(kg): --  CVP(mm Hg): --  CI: --  CAPILLARY BLOOD GLUCOSE      POCT Blood Glucose.: 212 mg/dL (05 Oct 2018 17:02)  POCT Blood Glucose.: 216 mg/dL (05 Oct 2018 13:19)  POCT Blood Glucose.: 209 mg/dL (05 Oct 2018 08:16)  POCT Blood Glucose.: 275 mg/dL (04 Oct 2018 21:20)   N/A  --------------------------------------------------------------------------------------    EXAM  General: NAD, resting comfortably  GI: abdomen soft, distended, tender to palpation in R/LQ    LABS  --------------------------------------------------------------------------------------  CBC (10-05 @ 09:27)                          10.3<L>                   5.35    )--------------(  292        --    % Neuts, --    % Lymphs, ANC: --                              33.3<L>  CBC (10-04 @ 19:40)                          11.2<L>                   7.09    )--------------(  353        --    % Neuts, --    % Lymphs, ANC: --                              36.4<L>    BMP (10-05 @ 09:27)       138     |  99      |  10    			Ca++ --      Ca 9.2          ---------------------------------( 216<H>		Mg 1.6          3.9     |  23      |  0.79  			Ph 2.3<L>  BMP (10-04 @ 08:00)       137     |  98      |  17    			Ca++ --      Ca 9.8          ---------------------------------( 159<H>		Mg --           4.1     |  25      |  0.90  			Ph --        LFTs (10-04 @ 08:00)      TPro 7.9 / Alb 4.0 / TBili 0.5 / DBili -- / AST 13 / ALT 7 / AlkPhos 65    Urinalysis (10-04 @ 06:46):     Color: YELLOW / Appearance: CLEAR / S.050<H> / pH: 6.0 / Gluc: NEGATIVE / Ketones: TRACE / Bili: NEGATIVE / Urobili: NORMAL / Protein :20 / Nitrites: NEGATIVE / Leuk.Est: NEGATIVE / RBC: 0-2 / WBC: 0-2 / Sq Epi: OCC / Non Sq Epi:  / Bacteria NEGATIVE     --------------------------------------------------------------------------------------    OTHER LABS    IMAGING RESULTS  CT A/P:       The perianal and digital rectal examinations were normal.       Non-bleeding internal hemorrhoids were found during retroflexion. The        hemorrhoids were medium-sized.       A moderate amount of liquid stool was found in the entire colon. This        was aspirated.       An ulcerated non-obstructing large mass was found in the cecum. The mass        was partially circumferential. No bleeding was present. Biopsies were        taken with a cold forceps for histology. Estimated blood loss was        minimal.       The exam was otherwise normal throughout the examined colon.                                                                                   Impression:          - Non-bleeding internal hemorrhoids.                       - Liquid stool in the entire examined colon, which was                        aspirated.                       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 Biopsied.  Recommendation:      - Return patient to [facility] for ongoing care.                       - Advance diet as tolerated.                       - Await pathology results.                       - CT chest for staging.                       - Consult colorectal surgery.

## 2018-10-05 NOTE — PROGRESS NOTE ADULT - ATTENDING COMMENTS
Case discussed with Dr. Zofia Duckworth (GI). Colonoscopy revealed large, friable cecal mass suggestive of malignancy. Will request Surgical Oncology evaluation in-house. Pt is otherwise stable after the procedure and will likely f/u biopsy results outpatient.    Time planning discharge 35 minutes.
Pt admitted with abdominal pain, chronic blood loss anemia and finding of cecal mass, concern for colon cancer. Also with dysphagia to solids. Appreciate GI input. Plan colo/EGD tomorrow.

## 2018-10-05 NOTE — PROGRESS NOTE ADULT - PROBLEM SELECTOR PLAN 2
Patient with noted dysphagia w/ solids, tolerates liquid. 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 GI following  EGD/ Colonoscopy today  - F/u GI recs

## 2018-10-05 NOTE — DISCHARGE NOTE ADULT - MEDICATION SUMMARY - MEDICATIONS TO STOP TAKING
I will STOP taking the medications listed below when I get home from the hospital:    Aspirin Enteric Coated 81 mg oral delayed release tablet  -- 1 tab(s) by mouth once a day    Plavix 75 mg oral tablet  -- 1 tab(s) by mouth once a day

## 2018-10-05 NOTE — DISCHARGE NOTE ADULT - ADDITIONAL INSTRUCTIONS
Please ollow up with your primary care doctor,   within one week of discharge. Please follow up with your primary care doctor,    within one week of discharge.  Please follow up with your surgical oncologist,  Please follow up with your gastroenterologist, Please follow up with your primary care doctor, Dr. Mando Arshad  within one week of discharge.  Please follow up with your surgical oncologist, Dr. Melo Leon.  Please follow up with your gastroenterologist, Dr. Zofia Duckworth

## 2018-10-05 NOTE — PROGRESS NOTE ADULT - PROBLEM SELECTOR PLAN 6
Hx of obstructive CAD w/ stent in the past.   - Will hold DAPT for pending endoscopic procedures.  - Will restart statin post endoscopic procedures

## 2018-10-05 NOTE — PROGRESS NOTE ADULT - PROBLEM SELECTOR PLAN 5
T2DM; Pt on novolog 70/30 flex pen; metformin 1000mg BID  Pt start one Lantus 5U basal coverage  - C/w ISS

## 2018-10-05 NOTE — DISCHARGE NOTE ADULT - CARE PLAN
Principal Discharge DX:	Cecal neoplasm  Goal:	Follow up  Assessment and plan of treatment:	You were found to have a mass in your gastrointestinal tract. You had a colonoscopy and biopspies of the mass were taken. Please follow up with the Surgical Oncologist to evaluate and plan for the next steps in your management.  Secondary Diagnosis:	Dysphagia, unspecified type  Goal:	Follow up  Assessment and plan of treatment:	You were found to have trouble swallowing solid foods. You had an endoscopic proceudre where your esophagus, stomach, and first portion of your small intestine were evaluated. Please follow up with your gastroenterologist within one week of discharge.

## 2018-10-05 NOTE — PROGRESS NOTE ADULT - PROBLEM SELECTOR PLAN 3
Patient with anemia, MCV 73, likely iron deficiency anemia vs AOCD in setting of new found cecal mass; FOBT +  F/u Iron studies

## 2018-10-05 NOTE — PROGRESS NOTE ADULT - ASSESSMENT
83M with PMHx of BPH, Prostate CA (s/p seeding and RT), GERD, HLD, DM2, CAD s/p stent (12 years ago) presenting with abdominal pain x 1 month, loose BMs, and significant weight loss found to have cecal mass on CTAP concerning for malignancy
83M with PMHx of BPH, Prostate CA (s/p seeding and RT), GERD, HLD, DM2, CAD s/p stent (12 years ago) presenting with abdominal pain x 1 month, loose BMs, and significant weight loss found to have cecal mass on CTAP concerning for malignancy

## 2018-10-05 NOTE — DISCHARGE NOTE ADULT - PROVIDER TOKENS
TOKEN:'[id number]',TOKEN:'[id number] TOKEN:'[id number]:'[id number]',FREE:[LAST:[MD Jeancarlos],FIRST:[Mando],PHONE:[phone]:[(   )    -]]

## 2018-10-05 NOTE — PROGRESS NOTE ADULT - PROBLEM SELECTOR PLAN 1
Patient with cecal mass with associated lymphadenopathy seen on CT A/P concerning for malignancy  Unlikely to be 2/2 prostate cancer, likely primary colon malignancy   FOCT +, last colonoscopy >10 years ago  EGD/ Colonoscopy today  - F/U GI recs

## 2018-10-05 NOTE — DISCHARGE NOTE ADULT - PATIENT PORTAL LINK FT
You can access the [facility] Patient Portal, offered by [facility], by registering with the following website: [email or web address]

## 2018-10-05 NOTE — PROGRESS NOTE ADULT - PROBLEM SELECTOR PROBLEM 1
HISTORY OF PRESENT ILLNESS:  Braxton Kenney is a 15 year old male who comes in today with mother for a School Sports Physical. Mother and patient deny any health concerns today.    No current outpatient prescriptions on file prior to visit.  No current facility-administered medications on file prior to visit.   ALLERGIES:  No Known Allergies   has no tobacco history on file.     PREPARTICIPATION PHYSICAL EVALUATION - HISTORY FORM   School or organization = [employer].  Sports = volleyball    1. Has a doctor ever denied or restricted your participation in sports for any reason?  no  2. Do you have any ongoing chronic medical conditions? No  3. Have you ever spent the night in the hospital?  Yes, patient was hospitalized when he had croup as a baby.  4. Have you ever had surgery?  no  5. Have you ever passed out or nearly passed out DURING or AFTER exercise?  no  6. Have you ever had discomfort, pain, tightness, or pressure in your chest during exercise?  no  7. Does your heart ever race or skip beats (irregular beats) during exercise?  no  8. Has a doctor ever told you that you have a heart problem such as; high blood pressure, high cholesterol, Kawasaki disease, heart murmur, heart infection?  no  9. Has a doctor ever ordered a test for your heart? (ex. EKG, echocardiogram)  no  10. Do you get lightheaded or feel more short of breath than expected during exercise?  no  11. Have you ever had an unexplained seizure?  no  12. Do you get more tired or short of breath more quickly than your friends during exercise?  no  13. Has any family member or relative  of heart problems or had any unexpected or unexplained sudden death before age 50 (including drowning, unexplained car accident, or sudden infant death syndrome)?  Yes, Maternal grandfather MI at age 43- unknown cause, but he was a smoker. Mother denies any known family genetic cardiac history.  14. Does anyone in your family have hypertrophic 
cardiomyopathy, Marfan syndrome, arrhythmogenic right ventricular cardiomyopathy, long QT syndrome, short QT syndrome, Brugada syndrome, or catecholaminergic polymorphic ventricular tachycardia? no  15. Does anyone in your family have a heart problem, pacemaker, or implanted defibrillator?  Yes, Paternal grandfather-defibrillator and Paternal grandmother-pacemaker. Mother denies any known family genetic cardiac history.  16. Has anyone in your family had unexplained fainting, unexplained seizures, or near drowning?  no  17. Have you ever had an injury to a bone, muscle, ligament or tendon that caused you to miss a practice or a game?  no  18. Have you ever had any broken or fractured bones or dislocated joints?  no  19. Have you ever had an injury that required x-rays, MRI, CT scan, injections, therapy, a brace, a cast or crutches? Yes, injury to right pointer finger at age 5 or 6. Patient only needed stitches, but x-ray was completed.  20. Have you ever had a stress fracture?  no  21. Have you ever been told that you have or have you had an x-ray for neck instability or atlantoaxial instability? (Down Syndrome or Dwarfism)  no  22. Do you regularly use a brace, orthotics, or other assistive devices?  no  23. Do you have a bone, muscle, or joint injury that bothers you?  no  24. Do any of your joints become painful, swollen, feel warm, or look red?  Yes, patient has hx of tendonitis to right knee- was told by MD to rest and ice as needed after physical activity. Patient states his knee is not painful or bothering him at this time.  25. Do you have any history of juvenile arthritis or connective tissue disease?  no  26. Do you cough, wheeze or have difficulty breathing during or after exercise?  no  27. Have you ever used an inhaler or taken asthma medication?  no  28. Is there anyone in your family who has asthma?  no  29. Were you born without or are you missing a kidney, an eye, a testicle (males), your spleen, or 
Cecal neoplasm
any other organ?  no  30. Do you have groin pain or a painful bulge or hernia in the groin area?  no  31. Have you had infectious mononucleosis (mono) within the last month?  no  32. Do you have any rashes, pressure sores, or other skin problems?  no  33. Have you ever had herpes or MRSA (methicillin-resistant staphylococcus aureus) skin infection? no  34. Have you ever had a head injury or concussion?  no  35. Have you ever had a hit or blow to the head that caused confusion, prolonged headache, or memory problems?  no  36. Do you have a history of seizure disorder?  no  37. Do you have headaches WITH exercise?  no  38. Have you ever had numbness, tingling, or weakness in your arms or legs after being hit or falling?  no  39. Have you ever been unable to move your arms or legs after being hit or falling?  no  40. Have you ever become ill while exercising in the heat?  no  41. Do you get frequent cramps when exercising?  no  42. Do you or someone in your family have sickle cell trait or disease?  no  43. Have you had any problems with your eyes or vision?  no  44. Have you had any eye injuries?  no  45. Do you wear glasses or contact lenses?  no  46. Do you wear protective eyewear, such as goggles or a face shield?  no  47. Do you worry about your weight?  no  48. Are you trying to or has anyone recommended that you gain or lose weight?  no  49. Are you on a special diet or do you avoid certain types of foods?  no  50. Have you ever had an eating disorder?  no  51. Do you have any concerns that you would like to discuss with a doctor?  no    PHYSICAL EXAM:  Visit Vitals  /68   Pulse 80   Resp 18   Ht 6' 1\" (1.854 m)   Wt 63.5 kg   SpO2 99%   BMI 18.47 kg/m²     Age/Gender Based Pediatric Blood Pressure Percentile =  Systolic 106 / Diastolic  68  (greater than 90th percentile will be referred to primary care provider for follow up prior to clearance)    Appearance: is alert and oriented x3. and appears well 
Cecal neoplasm
nourished and well hydrated.. No evidence of Marfan stigmata (kyphoscoliosis, high-arched palate, pectus excavatum, arachnodactyly, arm span greater than or equal to height, hyperlaxity, myopia, mitral valve prolapse, aortic insufficiency)  HEENT:    Head: Normocephalic. No masses, lesions, tenderness or abnormalities.  Eyes: PERRLA (pupils equal, round, and reactive to light and accommodation).  Sclerae white.  Conjunctivae normal. Funduscopic red reflex noted.  Optic disc normal and EOMs (extraocular movements are intact) intact.  Vision: Right Eye - 20/20 ; Left Eye - 20/20; Corrected:  No  Ears: TMs (tympanic membranes) intact.  Landmarks noted.  No erythema or drainage.  External canals clear.  Hearing appropriate to conversation.  Nose: Nares normal. Septum midline. Mucosa normal. No drainage or sinus tenderness.  Neck: Supple, no adenopathy and thyroid normal.  Throat: Without erythema or exudate. Tonsils 1+ bilaterally.    Lungs: Clear to auscultation bilaterally.   Heart:  Regular rate and rhythm. Normal S1, S2. No murmurs (auscultation sitting, standing, supine on left side).  Pulses: Radial, carotid, and femoral pulses 2+ bilaterally.   Abdomen: Normal, nontender, no masses or hepatosplenomegaly.  Skin: No obvious rashes present.   Neurologic: Normal mood and affect.  Alert, oriented to person, time and place.  Musculoskeletal: Range of motion - Within normal limits  Neck, Back, Shoulder/Arm, Elbow/Forearm, Wrist/Hand/Fingers, Hip/Thigh, Knee, Leg/Ankle, Foot/Toes, Functional (Duck-Walk, Single Leg Hop)  Back without evidence of scoliosis.  Genitalia: Mother and patient declined /hernia exam. Patient denies pain, bulging, or other concerns in the groin area. Advised patient to inform parents if those symptoms develop so he can be evaluated. Mother and patient verbalized understanding.    ASSESSESSMENT/PLAN:  Sports Physical Completed    Clearance Comments: Cleared for all sports without 
restriction    Education & Patient Teaching Material Given:  Discussed  - American Academy of Pediatrics   Care of the Young Athlete - Sports and Your Child;    Care of the Young Athlete - Concussions  - [employer] [employer] Your Well Being   Nutrition for the athlete   Grade-School/Adolescent Nutrition   Milk/Calcium Intake   Child/Adolescent Vaccine Recommendations  - [place] Consumer Product Safety Commission   Which Helmet for Which Activity      Braxton was seen today for sports physical.    Diagnoses and all orders for this visit:    Sports physical      No Follow-up on file.    Discussed: Patient is cleared for sports with no restrictions. Discussed with parent and athlete the limitations of the clinical exam in ruling out all causes of cardiac arrest in the athlete. Discussed signs/symptoms that would warrant follow up evaluation. Mother and athlete verbalized understanding. Health history, physical, and clearance forms were sent to medical records to be scanned into patient's chart. Advised mother and patient regarding the importance of regular physical exams with primary care provider. Due to hx right knee tendonitis, advised patient to follow up with PCP or orthopedic MD if symptoms are worsening over time, or if other concerning symptoms occur. Mother/patient verbalized understanding and had no questions during visit encounter.    ESTELLA Barnes

## 2018-10-05 NOTE — PROGRESS NOTE ADULT - SUBJECTIVE AND OBJECTIVE BOX
Shivam Lua MD  PGY 1 Department of Internal Medicine  Pager: [phone]    Patient is a 83y old  Male who presents with a chief complaint of abdominal pain (05 Oct 2018 06:42)      SUBJECTIVE / OVERNIGHT EVENTS: Overnight, Pt refused all lab draws. Pt reported that he completed about 2 bottles of the prep. Pt reported 1 yellowish BM this morning. Pt reported continued, but decrease diffuse abdominal pain.     MEDICATIONS  (STANDING):  dextrose 5%. 1000 milliLiter(s) (50 mL/Hr) IV Continuous <Continuous>  dextrose 50% Injectable 12.5 Gram(s) IV Push once  dextrose 50% Injectable 25 Gram(s) IV Push once  dextrose 50% Injectable 25 Gram(s) IV Push once  insulin glargine Injectable (LANTUS) 7 Unit(s) SubCutaneous at bedtime  insulin lispro (HumaLOG) corrective regimen sliding scale   SubCutaneous three times a day before meals  insulin lispro (HumaLOG) corrective regimen sliding scale   SubCutaneous at bedtime  insulin lispro Injectable (HumaLOG) 3 Unit(s) SubCutaneous three times a day before meals  pantoprazole    Tablet 40 milliGRAM(s) Oral every 12 hours  tamsulosin 0.8 milliGRAM(s) Oral at bedtime    MEDICATIONS  (PRN):  dextrose 40% Gel 15 Gram(s) Oral once PRN Blood Glucose LESS THAN 70 milliGRAM(s)/deciliter  glucagon  Injectable 1 milliGRAM(s) IntraMuscular once PRN Glucose LESS THAN 70 milligrams/deciliter  morphine  - Injectable 2 milliGRAM(s) IV Push every 4 hours PRN Severe Pain (7 - 10)      I&O's Summary      Vital Signs Last 24 Hrs  T(C): 36.6 (05 Oct 2018 13:25), Max: 36.9 (04 Oct 2018 15:18)  T(F): 97.8 (05 Oct 2018 13:25), Max: 98.4 (04 Oct 2018 15:18)  HR: 86 (05 Oct 2018 13:25) (69 - 91)  BP: 130/76 (05 Oct 2018 13:25) (113/60 - 131/73)  BP(mean): --  RR: 16 (05 Oct 2018 13:25) (16 - 20)  SpO2: 98% (05 Oct 2018 13:25) (98% - 99%)    CAPILLARY BLOOD GLUCOSE      POCT Blood Glucose.: 216 mg/dL (05 Oct 2018 13:19)  POCT Blood Glucose.: 209 mg/dL (05 Oct 2018 08:16)  POCT Blood Glucose.: 275 mg/dL (04 Oct 2018 21:20)  POCT Blood Glucose.: 273 mg/dL (04 Oct 2018 17:15)      PHYSICAL EXAM:  GENERAL: NAD,   HEAD:  Atraumatic, Normocephalic  EYES: EOMI, PERRL, conjunctiva and sclera clear  NECK: No JVD  CHEST/LUNG: Clear to auscultation bilaterally; No wheeze  HEART: Regular rate and rhythm; No murmurs, rubs, or gallops  ABDOMEN: Soft, diffuse tenderness in all four quadrants on palplation. Nondistended; Bowel sounds present  EXTREMITIES:  2+ Peripheral Pulses, No clubbing, cyanosis, or edema  PSYCH: AAOx3  NEUROLOGY: non-focal  SKIN: No rashes or lesions    LABS:                        10.3   5.35  )-----------( 292      ( 05 Oct 2018 09:27 )             33.3     Auto Eosinophil # x     / Auto Eosinophil % x     / Auto Neutrophil # x     / Auto Neutrophil % x     / BANDS % x                            11.2   7.09  )-----------( 353      ( 04 Oct 2018 19:40 )             36.4     Auto Eosinophil # x     / Auto Eosinophil % x     / Auto Neutrophil # x     / Auto Neutrophil % x     / BANDS % x                            10.1   5.54  )-----------( 300      ( 04 Oct 2018 08:00 )             33.4     Auto Eosinophil # x     / Auto Eosinophil % x     / Auto Neutrophil # x     / Auto Neutrophil % x     / BANDS % x        1005    138  |  99  |  10  ----------------------------<  216<H>  3.9   |  23  |  0.79  10    137  |  98  |  17  ----------------------------<  159<H>  4.1   |  25  |  0.90  10-03    138  |  97<L>  |  23  ----------------------------<  85  4.1   |  25  |  1.24    Ca    9.2      05 Oct 2018 09:27  Mg     1.6     10-  Phos  2.3     10-  TPro  7.9  /  Alb  4.0  /  TBili  0.5  /  DBili  x   /  AST  13  /  ALT  7   /  AlkPhos  65  10-  TPro  7.0  /  Alb  3.6  /  TBili  0.4  /  DBili  x   /  AST  13  /  ALT  7   /  AlkPhos  55  10-    PT/INR - ( 03 Oct 2018 16:00 )   PT: 12.6 SEC;   INR: 1.09          PTT - ( 03 Oct 2018 16:00 )  PTT:29.3 SEC      Urinalysis Basic - ( 04 Oct 2018 06:46 )    Color: YELLOW / Appearance: CLEAR / S.050 / pH: 6.0  Gluc: NEGATIVE / Ketone: TRACE  / Bili: NEGATIVE / Urobili: NORMAL   Blood: NEGATIVE / Protein: 20 / Nitrite: NEGATIVE   Leuk Esterase: NEGATIVE / RBC: 0-2 / WBC 0-2   Sq Epi: OCC / Non Sq Epi: x / Bacteria: NEGATIVE            RADIOLOGY & ADDITIONAL TESTS:    Imaging Personally Reviewed:    Consultant(s) Notes Reviewed:      Care Discussed with Consultants/Other Providers: Shivam Lua MD  PGY 1 Department of Internal Medicine  Pager: [phone]    Patient is a 83y old  Male who presents with a chief complaint of abdominal pain (05 Oct 2018 06:42)      SUBJECTIVE / OVERNIGHT EVENTS: Overnight, Pt refused all lab draws. Pt reported that he completed about 2 bottles of the prep. Pt reported 1 yellowish BM this morning. Pt reported continued, but decrease diffuse abdominal pain. Pt reported no other complaints.     MEDICATIONS  (STANDING):  dextrose 5%. 1000 milliLiter(s) (50 mL/Hr) IV Continuous <Continuous>  dextrose 50% Injectable 12.5 Gram(s) IV Push once  dextrose 50% Injectable 25 Gram(s) IV Push once  dextrose 50% Injectable 25 Gram(s) IV Push once  insulin glargine Injectable (LANTUS) 7 Unit(s) SubCutaneous at bedtime  insulin lispro (HumaLOG) corrective regimen sliding scale   SubCutaneous three times a day before meals  insulin lispro (HumaLOG) corrective regimen sliding scale   SubCutaneous at bedtime  insulin lispro Injectable (HumaLOG) 3 Unit(s) SubCutaneous three times a day before meals  pantoprazole    Tablet 40 milliGRAM(s) Oral every 12 hours  tamsulosin 0.8 milliGRAM(s) Oral at bedtime    MEDICATIONS  (PRN):  dextrose 40% Gel 15 Gram(s) Oral once PRN Blood Glucose LESS THAN 70 milliGRAM(s)/deciliter  glucagon  Injectable 1 milliGRAM(s) IntraMuscular once PRN Glucose LESS THAN 70 milligrams/deciliter  morphine  - Injectable 2 milliGRAM(s) IV Push every 4 hours PRN Severe Pain (7 - 10)      I&O's Summary      Vital Signs Last 24 Hrs  T(C): 36.6 (05 Oct 2018 13:25), Max: 36.9 (04 Oct 2018 15:18)  T(F): 97.8 (05 Oct 2018 13:25), Max: 98.4 (04 Oct 2018 15:18)  HR: 86 (05 Oct 2018 13:25) (69 - 91)  BP: 130/76 (05 Oct 2018 13:25) (113/60 - 131/73)  BP(mean): --  RR: 16 (05 Oct 2018 13:25) (16 - 20)  SpO2: 98% (05 Oct 2018 13:25) (98% - 99%)    CAPILLARY BLOOD GLUCOSE      POCT Blood Glucose.: 216 mg/dL (05 Oct 2018 13:19)  POCT Blood Glucose.: 209 mg/dL (05 Oct 2018 08:16)  POCT Blood Glucose.: 275 mg/dL (04 Oct 2018 21:20)  POCT Blood Glucose.: 273 mg/dL (04 Oct 2018 17:15)      PHYSICAL EXAM:  GENERAL: NAD,   HEAD:  Atraumatic, Normocephalic  EYES: EOMI, PERRL, conjunctiva and sclera clear  NECK: No JVD  CHEST/LUNG: Clear to auscultation bilaterally; No wheeze  HEART: Regular rate and rhythm; No murmurs, rubs, or gallops  ABDOMEN: Soft, diffuse tenderness in all four quadrants on palplation. Nondistended; Bowel sounds present  EXTREMITIES:  2+ Peripheral Pulses, No clubbing, cyanosis, or edema  PSYCH: AAOx3  NEUROLOGY: non-focal  SKIN: No rashes or lesions    LABS:                        10.3   5.35  )-----------( 292      ( 05 Oct 2018 09:27 )             33.3     Auto Eosinophil # x     / Auto Eosinophil % x     / Auto Neutrophil # x     / Auto Neutrophil % x     / BANDS % x                            11.2   7.09  )-----------( 353      ( 04 Oct 2018 19:40 )             36.4     Auto Eosinophil # x     / Auto Eosinophil % x     / Auto Neutrophil # x     / Auto Neutrophil % x     / BANDS % x                            10.1   5.54  )-----------( 300      ( 04 Oct 2018 08:00 )             33.4     Auto Eosinophil # x     / Auto Eosinophil % x     / Auto Neutrophil # x     / Auto Neutrophil % x     / BANDS % x        10-05    138  |  99  |  10  ----------------------------<  216<H>  3.9   |  23  |  0.79  10-04    137  |  98  |  17  ----------------------------<  159<H>  4.1   |  25  |  0.90  10-03    138  |  97<L>  |  23  ----------------------------<  85  4.1   |  25  |  1.24    Ca    9.2      05 Oct 2018 09:27  Mg     1.6     10-  Phos  2.3     10-  TPro  7.9  /  Alb  4.0  /  TBili  0.5  /  DBili  x   /  AST  13  /  ALT  7   /  AlkPhos  65  10-  TPro  7.0  /  Alb  3.6  /  TBili  0.4  /  DBili  x   /  AST  13  /  ALT  7   /  AlkPhos  55  10-03    PT/INR - ( 03 Oct 2018 16:00 )   PT: 12.6 SEC;   INR: 1.09          PTT - ( 03 Oct 2018 16:00 )  PTT:29.3 SEC      Urinalysis Basic - ( 04 Oct 2018 06:46 )    Color: YELLOW / Appearance: CLEAR / S.050 / pH: 6.0  Gluc: NEGATIVE / Ketone: TRACE  / Bili: NEGATIVE / Urobili: NORMAL   Blood: NEGATIVE / Protein: 20 / Nitrite: NEGATIVE   Leuk Esterase: NEGATIVE / RBC: 0-2 / WBC 0-2   Sq Epi: OCC / Non Sq Epi: x / Bacteria: NEGATIVE            RADIOLOGY & ADDITIONAL TESTS:    Imaging Personally Reviewed:    Consultant(s) Notes Reviewed:      Care Discussed with Consultants/Other Providers:

## 2018-10-05 NOTE — CONSULT NOTE ADULT - ASSESSMENT
Brianda Martinez, PGY-2  Surgery pager [phone] ASSESSMENT:  83M w/ prostate cancer (s/p rads, seeding), BPH, GERD, DM, CAD s/p stent (2006), now presenting with abdominal pain, weight loss, and a new large cecal mass concerning for colon cancer.     - Patient will need a staging CT to evaluate for possible mets  - Please obtain CEA to establish baseline  - Patient to follow up with Dr. Carolyn Martinez, PGY-2  Surgery pager [phone] ASSESSMENT:  83M w/ prostate cancer (s/p rads, seeding), BPH, GERD, DM, CAD s/p stent (2006), now presenting with abdominal pain, weight loss, and a new large cecal mass concerning for colon cancer.     - Patient will need a staging CT chest to evaluate for possible mets  - Please obtain CEA to establish baseline  - Patient to follow up with Dr. Carolyn Martinez, PGY-2  Surgery pager [phone]

## 2018-10-09 LAB — SURGICAL PATHOLOGY STUDY: SIGNIFICANT CHANGE UP

## 2018-10-16 ENCOUNTER — CLINICAL ADVICE (OUTPATIENT)
Age: 83
End: 2018-10-16

## 2018-10-16 ENCOUNTER — OTHER (OUTPATIENT)
Age: 83
End: 2018-10-16

## 2018-10-16 DIAGNOSIS — D12.6 BENIGN NEOPLASM OF COLON, UNSPECIFIED: ICD-10-CM

## 2018-10-19 ENCOUNTER — OUTPATIENT (OUTPATIENT)
Dept: OUTPATIENT SERVICES | Facility: HOSPITAL | Age: 83
LOS: 1 days | End: 2018-10-19
Payer: COMMERCIAL

## 2018-10-19 ENCOUNTER — APPOINTMENT (OUTPATIENT)
Dept: CT IMAGING | Facility: IMAGING CENTER | Age: 83
End: 2018-10-19
Payer: MEDICARE

## 2018-10-19 DIAGNOSIS — D12.6 BENIGN NEOPLASM OF COLON, UNSPECIFIED: ICD-10-CM

## 2018-10-19 PROCEDURE — 71250 CT THORAX DX C-: CPT | Mod: 26

## 2018-10-19 PROCEDURE — 71250 CT THORAX DX C-: CPT

## 2018-10-22 ENCOUNTER — APPOINTMENT (OUTPATIENT)
Dept: SURGICAL ONCOLOGY | Facility: CLINIC | Age: 83
End: 2018-10-22
Payer: MEDICARE

## 2018-10-22 VITALS
DIASTOLIC BLOOD PRESSURE: 53 MMHG | HEIGHT: 64 IN | RESPIRATION RATE: 16 BRPM | SYSTOLIC BLOOD PRESSURE: 88 MMHG | BODY MASS INDEX: 27.31 KG/M2 | WEIGHT: 160 LBS | HEART RATE: 65 BPM

## 2018-10-22 DIAGNOSIS — Z78.9 OTHER SPECIFIED HEALTH STATUS: ICD-10-CM

## 2018-10-22 PROCEDURE — 99214 OFFICE O/P EST MOD 30 MIN: CPT

## 2018-10-22 RX ORDER — CLOPIDOGREL BISULFATE 75 MG/1
75 TABLET, FILM COATED ORAL DAILY
Qty: 90 | Refills: 0 | Status: DISCONTINUED | COMMUNITY
Start: 2017-06-16 | End: 2018-10-22

## 2018-10-23 ENCOUNTER — OTHER (OUTPATIENT)
Age: 83
End: 2018-10-23

## 2018-10-25 ENCOUNTER — OUTPATIENT (OUTPATIENT)
Dept: OUTPATIENT SERVICES | Facility: HOSPITAL | Age: 83
LOS: 1 days | End: 2018-10-25
Payer: COMMERCIAL

## 2018-10-25 ENCOUNTER — APPOINTMENT (OUTPATIENT)
Dept: NUCLEAR MEDICINE | Facility: IMAGING CENTER | Age: 83
End: 2018-10-25
Payer: COMMERCIAL

## 2018-10-25 DIAGNOSIS — K63.9 DISEASE OF INTESTINE, UNSPECIFIED: ICD-10-CM

## 2018-10-25 PROCEDURE — 78815 PET IMAGE W/CT SKULL-THIGH: CPT | Mod: 26,PI

## 2018-10-25 PROCEDURE — 78815 PET IMAGE W/CT SKULL-THIGH: CPT

## 2018-10-25 PROCEDURE — A9552: CPT

## 2018-11-01 ENCOUNTER — APPOINTMENT (OUTPATIENT)
Dept: SURGICAL ONCOLOGY | Facility: CLINIC | Age: 83
End: 2018-11-01
Payer: MEDICARE

## 2018-11-04 ENCOUNTER — EMERGENCY (EMERGENCY)
Facility: HOSPITAL | Age: 83
LOS: 1 days | Discharge: HOME | End: 2018-11-04
Attending: EMERGENCY MEDICINE | Admitting: EMERGENCY MEDICINE
Payer: MEDICARE

## 2018-11-04 VITALS
OXYGEN SATURATION: 100 % | TEMPERATURE: 99 F | DIASTOLIC BLOOD PRESSURE: 54 MMHG | RESPIRATION RATE: 19 BRPM | SYSTOLIC BLOOD PRESSURE: 106 MMHG | HEART RATE: 83 BPM

## 2018-11-04 LAB
ALBUMIN SERPL ELPH-MCNC: 3.1 G/DL — LOW (ref 3.3–5)
ALP SERPL-CCNC: 58 U/L — SIGNIFICANT CHANGE UP (ref 40–120)
ALT FLD-CCNC: 8 U/L — SIGNIFICANT CHANGE UP (ref 4–41)
AST SERPL-CCNC: 12 U/L — SIGNIFICANT CHANGE UP (ref 4–40)
BASE EXCESS BLDV CALC-SCNC: 0.1 MMOL/L — SIGNIFICANT CHANGE UP
BASOPHILS # BLD AUTO: 0.01 K/UL — SIGNIFICANT CHANGE UP (ref 0–0.2)
BASOPHILS NFR BLD AUTO: 0.1 % — SIGNIFICANT CHANGE UP (ref 0–2)
BILIRUB SERPL-MCNC: 0.3 MG/DL — SIGNIFICANT CHANGE UP (ref 0.2–1.2)
BLOOD GAS VENOUS - CREATININE: 0.77 MG/DL — SIGNIFICANT CHANGE UP (ref 0.5–1.3)
BUN SERPL-MCNC: 14 MG/DL — SIGNIFICANT CHANGE UP (ref 7–23)
CALCIUM SERPL-MCNC: 9.3 MG/DL — SIGNIFICANT CHANGE UP (ref 8.4–10.5)
CHLORIDE BLDV-SCNC: 103 MMOL/L — SIGNIFICANT CHANGE UP (ref 96–108)
CHLORIDE SERPL-SCNC: 99 MMOL/L — SIGNIFICANT CHANGE UP (ref 98–107)
CO2 SERPL-SCNC: 22 MMOL/L — SIGNIFICANT CHANGE UP (ref 22–31)
CREAT SERPL-MCNC: 0.84 MG/DL — SIGNIFICANT CHANGE UP (ref 0.5–1.3)
EOSINOPHIL # BLD AUTO: 0.01 K/UL — SIGNIFICANT CHANGE UP (ref 0–0.5)
EOSINOPHIL NFR BLD AUTO: 0.1 % — SIGNIFICANT CHANGE UP (ref 0–6)
GAS PNL BLDV: 137 MMOL/L — SIGNIFICANT CHANGE UP (ref 136–146)
GLUCOSE BLDV-MCNC: 179 — HIGH (ref 70–99)
GLUCOSE SERPL-MCNC: 183 MG/DL — HIGH (ref 70–99)
HCO3 BLDV-SCNC: 23 MMOL/L — SIGNIFICANT CHANGE UP (ref 20–27)
HCT VFR BLD CALC: 33 % — LOW (ref 39–50)
HCT VFR BLDV CALC: 32 % — LOW (ref 39–51)
HGB BLD-MCNC: 10.1 G/DL — LOW (ref 13–17)
HGB BLDV-MCNC: 10.4 G/DL — LOW (ref 13–17)
IMM GRANULOCYTES # BLD AUTO: 0.04 # — SIGNIFICANT CHANGE UP
IMM GRANULOCYTES NFR BLD AUTO: 0.4 % — SIGNIFICANT CHANGE UP (ref 0–1.5)
LACTATE BLDV-MCNC: 3.4 MMOL/L — HIGH (ref 0.5–2)
LIDOCAIN IGE QN: 11.4 U/L — SIGNIFICANT CHANGE UP (ref 7–60)
LYMPHOCYTES # BLD AUTO: 2 K/UL — SIGNIFICANT CHANGE UP (ref 1–3.3)
LYMPHOCYTES # BLD AUTO: 20.1 % — SIGNIFICANT CHANGE UP (ref 13–44)
MCHC RBC-ENTMCNC: 22.7 PG — LOW (ref 27–34)
MCHC RBC-ENTMCNC: 30.6 % — LOW (ref 32–36)
MCV RBC AUTO: 74.3 FL — LOW (ref 80–100)
MONOCYTES # BLD AUTO: 0.46 K/UL — SIGNIFICANT CHANGE UP (ref 0–0.9)
MONOCYTES NFR BLD AUTO: 4.6 % — SIGNIFICANT CHANGE UP (ref 2–14)
NEUTROPHILS # BLD AUTO: 7.41 K/UL — HIGH (ref 1.8–7.4)
NEUTROPHILS NFR BLD AUTO: 74.7 % — SIGNIFICANT CHANGE UP (ref 43–77)
NRBC # FLD: 0 — SIGNIFICANT CHANGE UP
PCO2 BLDV: 48 MMHG — SIGNIFICANT CHANGE UP (ref 41–51)
PH BLDV: 7.34 PH — SIGNIFICANT CHANGE UP (ref 7.32–7.43)
PLATELET # BLD AUTO: 392 K/UL — SIGNIFICANT CHANGE UP (ref 150–400)
PMV BLD: 9.6 FL — SIGNIFICANT CHANGE UP (ref 7–13)
PO2 BLDV: 24 MMHG — LOW (ref 35–40)
POTASSIUM BLDV-SCNC: 3.9 MMOL/L — SIGNIFICANT CHANGE UP (ref 3.4–4.5)
POTASSIUM SERPL-MCNC: 4 MMOL/L — SIGNIFICANT CHANGE UP (ref 3.5–5.3)
POTASSIUM SERPL-SCNC: 4 MMOL/L — SIGNIFICANT CHANGE UP (ref 3.5–5.3)
PROT SERPL-MCNC: 6.9 G/DL — SIGNIFICANT CHANGE UP (ref 6–8.3)
RBC # BLD: 4.44 M/UL — SIGNIFICANT CHANGE UP (ref 4.2–5.8)
RBC # FLD: 18.6 % — HIGH (ref 10.3–14.5)
SAO2 % BLDV: 28.7 % — LOW (ref 60–85)
SODIUM SERPL-SCNC: 137 MMOL/L — SIGNIFICANT CHANGE UP (ref 135–145)
WBC # BLD: 9.93 K/UL — SIGNIFICANT CHANGE UP (ref 3.8–10.5)
WBC # FLD AUTO: 9.93 K/UL — SIGNIFICANT CHANGE UP (ref 3.8–10.5)

## 2018-11-04 PROCEDURE — 99284 EMERGENCY DEPT VISIT MOD MDM: CPT | Mod: GC

## 2018-11-04 RX ORDER — ACETAMINOPHEN 500 MG
650 TABLET ORAL ONCE
Qty: 0 | Refills: 0 | Status: COMPLETED | OUTPATIENT
Start: 2018-11-04 | End: 2018-11-05

## 2018-11-04 NOTE — ED PROVIDER NOTE - PLAN OF CARE
1) Please follow up with your Primary Care Provider in 24-48 hours  2) Seek immediate medical care for any new or returning symptoms including but not limited severe pain, high fevers, vomiting  3) Take Percocet for pain up to three times a day. Do not take more than three pills in one day  4) A copy of your results have been provided  5) Follow up with Dr. Leon on Thursday on 11/8/18

## 2018-11-04 NOTE — ED PROVIDER NOTE - OBJECTIVE STATEMENT
22:55 Wilfrido att: 83M h/o bph, prostate ca w seeds, recently rectal malignancy c/o 2 mo rlq abd pain. Past 2 months rlq abd pain, chronic nausea no emesis. Few days ago patient underwent PET scan, results unk, upcoming Surg Onc appointmetn to discuss options. Presents today with same level of pain. Last bm this morning. Denies f, n, v, chills, cough. 22:55 Wilfrido att: 83M h/o bph, prostate ca w seeds, recently rectal malignancy c/o 2 mo rlq abd pain and 1 day sob. Past 2 months unchanged rlq abd pain, chronic nausea no emesis. 10/25 patient underwent PET scan, per family results unk, upcoming 11/8 Surg Onc appointment with Dr. Barger to discuss options. When asked, patient states he came to ER for belly pain, same pain for 2 months, constant pain. Today same level of pain, last bm this morning, no fever, no chills and tolerates po. When asked about SOB patient states abdominal pain was so severe today he felt SOB and mild pleurisy. Unclear duration, resolved when EMS arrived, no o2 or meds given. Denies active dyspnea, any chest pain today, leg swelling, tachypnea, tachycardia, or cough.

## 2018-11-04 NOTE — ED PROVIDER NOTE - ATTENDING CONTRIBUTION TO CARE
Dr. Anna: I have personally seen and examined this patient at the bedside. I have fully participated in the care of this patient. I have reviewed all pertinent clinical information, including history, physical exam, plan and the Resident's note and agree except as noted. HPI above as by me. PE above as by me. (1) Dyspnea setting of abdominal pain, no active dyspnea VSS. EKG nonisch PLAN xr r/o pna, cbc r/o anemia, trop x 2. If trop delta < 6 then ok for outpt workup. Less likely PE given episode lasting approx 1 hour, vss, neg le swelling. (2) Abdominal pain. No active infection. Abd soft. Last bm this morning. 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 Explained at length to patient, patient's son and patient's daughter to keep with current plan in place, keep Surg Onc appointment for 11/8 to discuss options. While in ER patient shows no sign of infection or obstruction to necessitate repeat imaging or intervention.

## 2018-11-04 NOTE — ED ADULT NURSE NOTE - OBJECTIVE STATEMENT
pt a&o x3 c/o generalized abdominal pain x 2 months, dx with colon ca at that time. endorses periodic n/v and inability to tolerate PO. last bm today. vss. pt tolerating n/c. states he is not on any chemo or pain medications. md assessed. right ac 20 gplaced. will monitor nad ntoed

## 2018-11-04 NOTE — ED ADULT TRIAGE NOTE - CHIEF COMPLAINT QUOTE
alert no distress  c/o SOB arrives on O2 2LPM  also c/o RLQ abd pain  recent dx colon Ca no chemo yet  hx DM

## 2018-11-04 NOTE — ED PROVIDER NOTE - MEDICAL DECISION MAKING DETAILS
(1) Dyspnea setting of abdominal pain, no active dyspnea VSS. EKG nonisch PLAN xr r/o pna, cbc r/o anemia, trop x 2. If trop delta < 6 then ok for outpt workup. Less likely PE given episode lasting approx 1 hour, vss, neg le swelling. (2) Abdominal pain. No active infection. Abd soft. Last bm this morning. 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 Explained at length to patient, patient's son and patient's daughter to keep with current plan in place, keep Surg Onc appointment for 11/8 to discuss options. While in ER patient shows no sign of infection or obstruction to necessitate repeat imaging or intervention.

## 2018-11-04 NOTE — ED PROVIDER NOTE - PROGRESS NOTE DETAILS
Patient reassessed, NAD, non-toxic appearing. pt states he has had "great relief" with pain medication. results reviewed with patient and family, questions answered. daughter who is healthcare proxy states he has been doing better recently and does not see reason for admission, nor does pt. pt has f/u with Dr. Leon this Thursday. offered home health aide assistance and referral however daughter states they have tried previously and pt has declined their services. will dc with percocet.  - Edgar Warren D.O. PGY1 Patient reassessed, NAD, non-toxic appearing. pt states he has had "great relief" with pain medication. results reviewed with patient and family, questions answered. daughter who is healthcare proxy states he has been doing better recently and does not see reason for admission, nor does pt. pt has f/u with Dr. Leon this Thursday. offered home health aide assistance and referral however daughter states they have tried previously and pt has declined their services. will dc with percocet. rp bp prior to /49.  - Edgar Warren D.O. PGY1

## 2018-11-04 NOTE — ED ADULT NURSE NOTE - NSIMPLEMENTINTERV_GEN_ALL_ED
Implemented All Fall Risk Interventions:  [place] to call system. Call bell, personal items and telephone within reach. Instruct patient to call for assistance. Room bathroom lighting operational. Non-slip footwear when patient is off stretcher. Physically safe environment: no spills, clutter or unnecessary equipment. Stretcher in lowest position, wheels locked, appropriate side rails in place. Provide visual cue, wrist band, yellow gown, etc. Monitor gait and stability. Monitor for mental status changes and reorient to person, place, and time. Review medications for side effects contributing to fall risk. Reinforce activity limits and safety measures with patient and family.

## 2018-11-04 NOTE — ED PROVIDER NOTE - CARE PLAN
Principal Discharge DX:	Dyspnea  Secondary Diagnosis:	Right lower quadrant abdominal pain Principal Discharge DX:	Dyspnea  Assessment and plan of treatment:	1) Please follow up with your Primary Care Provider in 24-48 hours  2) Seek immediate medical care for any new or returning symptoms including but not limited severe pain, high fevers, vomiting  3) Take Percocet for pain up to three times a day. Do not take more than three pills in one day  4) A copy of your results have been provided  5) Follow up with Dr. Leon on Thursday on 11/8/18  Secondary Diagnosis:	Right lower quadrant abdominal pain

## 2018-11-05 VITALS — DIASTOLIC BLOOD PRESSURE: 54 MMHG | SYSTOLIC BLOOD PRESSURE: 104 MMHG

## 2018-11-05 LAB
NT-PROBNP SERPL-SCNC: 658.5 PG/ML — SIGNIFICANT CHANGE UP
TROPONIN T, HIGH SENSITIVITY: 45 NG/L — SIGNIFICANT CHANGE UP (ref ?–14)
TROPONIN T, HIGH SENSITIVITY: 46 NG/L — SIGNIFICANT CHANGE UP (ref ?–14)

## 2018-11-05 PROCEDURE — 71046 X-RAY EXAM CHEST 2 VIEWS: CPT | Mod: 26

## 2018-11-05 RX ORDER — FENTANYL CITRATE 50 UG/ML
50 INJECTION INTRAVENOUS ONCE
Qty: 0 | Refills: 0 | Status: DISCONTINUED | OUTPATIENT
Start: 2018-11-05 | End: 2018-11-05

## 2018-11-05 RX ORDER — MORPHINE SULFATE 50 MG/1
2 CAPSULE, EXTENDED RELEASE ORAL ONCE
Qty: 0 | Refills: 0 | Status: DISCONTINUED | OUTPATIENT
Start: 2018-11-05 | End: 2018-11-05

## 2018-11-05 RX ORDER — SODIUM CHLORIDE 9 MG/ML
1000 INJECTION INTRAMUSCULAR; INTRAVENOUS; SUBCUTANEOUS ONCE
Qty: 0 | Refills: 0 | Status: COMPLETED | OUTPATIENT
Start: 2018-11-05 | End: 2018-11-05

## 2018-11-05 RX ORDER — KETOROLAC TROMETHAMINE 30 MG/ML
15 SYRINGE (ML) INJECTION ONCE
Qty: 0 | Refills: 0 | Status: DISCONTINUED | OUTPATIENT
Start: 2018-11-05 | End: 2018-11-05

## 2018-11-05 RX ORDER — FENTANYL CITRATE 50 UG/ML
1 INJECTION INTRAVENOUS ONCE
Qty: 0 | Refills: 0 | Status: DISCONTINUED | OUTPATIENT
Start: 2018-11-05 | End: 2018-11-05

## 2018-11-05 RX ADMIN — Medication 650 MILLIGRAM(S): at 01:06

## 2018-11-05 RX ADMIN — FENTANYL CITRATE 50 MICROGRAM(S): 50 INJECTION INTRAVENOUS at 02:25

## 2018-11-05 RX ADMIN — SODIUM CHLORIDE 1000 MILLILITER(S): 9 INJECTION INTRAMUSCULAR; INTRAVENOUS; SUBCUTANEOUS at 02:25

## 2018-11-08 ENCOUNTER — APPOINTMENT (OUTPATIENT)
Dept: SURGICAL ONCOLOGY | Facility: CLINIC | Age: 83
End: 2018-11-08
Payer: MEDICARE

## 2018-11-08 VITALS
OXYGEN SATURATION: 98 % | WEIGHT: 160 LBS | HEART RATE: 72 BPM | DIASTOLIC BLOOD PRESSURE: 50 MMHG | SYSTOLIC BLOOD PRESSURE: 90 MMHG | HEIGHT: 64 IN | BODY MASS INDEX: 27.31 KG/M2

## 2018-11-08 PROCEDURE — 99214 OFFICE O/P EST MOD 30 MIN: CPT

## 2018-11-11 ENCOUNTER — EMERGENCY (EMERGENCY)
Facility: HOSPITAL | Age: 83
LOS: 1 days | Discharge: HOME | End: 2018-11-11
Attending: EMERGENCY MEDICINE | Admitting: EMERGENCY MEDICINE
Payer: MEDICARE

## 2018-11-11 VITALS
RESPIRATION RATE: 16 BRPM | OXYGEN SATURATION: 98 % | DIASTOLIC BLOOD PRESSURE: 57 MMHG | HEART RATE: 92 BPM | SYSTOLIC BLOOD PRESSURE: 134 MMHG | TEMPERATURE: 96 F

## 2018-11-11 PROCEDURE — 99284 EMERGENCY DEPT VISIT MOD MDM: CPT | Mod: 25

## 2018-11-11 RX ORDER — DEXTROSE 50 % IN WATER 50 %
50 SYRINGE (ML) INTRAVENOUS ONCE
Qty: 0 | Refills: 0 | Status: COMPLETED | OUTPATIENT
Start: 2018-11-11 | End: 2018-11-11

## 2018-11-11 RX ADMIN — Medication 50 MILLILITER(S): at 23:58

## 2018-11-11 NOTE — ED ADULT TRIAGE NOTE - CHIEF COMPLAINT QUOTE
Pt brought in for AMS w fs reading low from EMS, no access or glucose given by EMS, pt brought directly to Trauma A, team aware.

## 2018-11-11 NOTE — ED ADULT NURSE NOTE - OBJECTIVE STATEMENT
alert oriented x 3 (after D50W) FS was 26  still with slurred speech but its improving  cooperative  afib on scope

## 2018-11-11 NOTE — ED ADULT NURSE NOTE - NSIMPLEMENTINTERV_GEN_ALL_ED
Implemented All Universal Safety Interventions:  [place] to call system. Call bell, personal items and telephone within reach. Instruct patient to call for assistance. Room bathroom lighting operational. Non-slip footwear when patient is off stretcher. Physically safe environment: no spills, clutter or unnecessary equipment. Stretcher in lowest position, wheels locked, appropriate side rails in place.

## 2018-11-12 ENCOUNTER — CLINICAL ADVICE (OUTPATIENT)
Age: 83
End: 2018-11-12

## 2018-11-12 VITALS
DIASTOLIC BLOOD PRESSURE: 60 MMHG | OXYGEN SATURATION: 98 % | TEMPERATURE: 98 F | HEART RATE: 66 BPM | SYSTOLIC BLOOD PRESSURE: 114 MMHG | RESPIRATION RATE: 18 BRPM

## 2018-11-12 LAB
ALBUMIN SERPL ELPH-MCNC: 3 G/DL — LOW (ref 3.3–5)
ALP SERPL-CCNC: 61 U/L — SIGNIFICANT CHANGE UP (ref 40–120)
ALT FLD-CCNC: 12 U/L — SIGNIFICANT CHANGE UP (ref 4–41)
APPEARANCE UR: CLEAR — SIGNIFICANT CHANGE UP
AST SERPL-CCNC: 24 U/L — SIGNIFICANT CHANGE UP (ref 4–40)
BASOPHILS # BLD AUTO: 0.01 K/UL — SIGNIFICANT CHANGE UP (ref 0–0.2)
BASOPHILS NFR BLD AUTO: 0.1 % — SIGNIFICANT CHANGE UP (ref 0–2)
BILIRUB SERPL-MCNC: 0.2 MG/DL — SIGNIFICANT CHANGE UP (ref 0.2–1.2)
BILIRUB UR-MCNC: NEGATIVE — SIGNIFICANT CHANGE UP
BLOOD UR QL VISUAL: NEGATIVE — SIGNIFICANT CHANGE UP
BUN SERPL-MCNC: 12 MG/DL — SIGNIFICANT CHANGE UP (ref 7–23)
CALCIUM SERPL-MCNC: 8.9 MG/DL — SIGNIFICANT CHANGE UP (ref 8.4–10.5)
CHLORIDE SERPL-SCNC: 101 MMOL/L — SIGNIFICANT CHANGE UP (ref 98–107)
CO2 SERPL-SCNC: 22 MMOL/L — SIGNIFICANT CHANGE UP (ref 22–31)
COLOR SPEC: SIGNIFICANT CHANGE UP
CREAT SERPL-MCNC: 0.64 MG/DL — SIGNIFICANT CHANGE UP (ref 0.5–1.3)
EOSINOPHIL # BLD AUTO: 0.01 K/UL — SIGNIFICANT CHANGE UP (ref 0–0.5)
EOSINOPHIL NFR BLD AUTO: 0.1 % — SIGNIFICANT CHANGE UP (ref 0–6)
GLUCOSE SERPL-MCNC: 20 MG/DL — CRITICAL LOW (ref 70–99)
GLUCOSE UR-MCNC: NEGATIVE — SIGNIFICANT CHANGE UP
HCT VFR BLD CALC: 28.2 % — LOW (ref 39–50)
HGB BLD-MCNC: 8.5 G/DL — LOW (ref 13–17)
IMM GRANULOCYTES # BLD AUTO: 0.05 # — SIGNIFICANT CHANGE UP
IMM GRANULOCYTES NFR BLD AUTO: 0.6 % — SIGNIFICANT CHANGE UP (ref 0–1.5)
KETONES UR-MCNC: NEGATIVE — SIGNIFICANT CHANGE UP
LEUKOCYTE ESTERASE UR-ACNC: NEGATIVE — SIGNIFICANT CHANGE UP
LYMPHOCYTES # BLD AUTO: 2.37 K/UL — SIGNIFICANT CHANGE UP (ref 1–3.3)
LYMPHOCYTES # BLD AUTO: 28.7 % — SIGNIFICANT CHANGE UP (ref 13–44)
MCHC RBC-ENTMCNC: 22.2 PG — LOW (ref 27–34)
MCHC RBC-ENTMCNC: 30.1 % — LOW (ref 32–36)
MCV RBC AUTO: 73.6 FL — LOW (ref 80–100)
MONOCYTES # BLD AUTO: 1.01 K/UL — HIGH (ref 0–0.9)
MONOCYTES NFR BLD AUTO: 12.2 % — SIGNIFICANT CHANGE UP (ref 2–14)
NEUTROPHILS # BLD AUTO: 4.82 K/UL — SIGNIFICANT CHANGE UP (ref 1.8–7.4)
NEUTROPHILS NFR BLD AUTO: 58.3 % — SIGNIFICANT CHANGE UP (ref 43–77)
NITRITE UR-MCNC: NEGATIVE — SIGNIFICANT CHANGE UP
NRBC # FLD: 0 — SIGNIFICANT CHANGE UP
PH UR: 6 — SIGNIFICANT CHANGE UP (ref 5–8)
PLATELET # BLD AUTO: 365 K/UL — SIGNIFICANT CHANGE UP (ref 150–400)
PMV BLD: 9.5 FL — SIGNIFICANT CHANGE UP (ref 7–13)
POTASSIUM SERPL-MCNC: 4 MMOL/L — SIGNIFICANT CHANGE UP (ref 3.5–5.3)
POTASSIUM SERPL-SCNC: 4 MMOL/L — SIGNIFICANT CHANGE UP (ref 3.5–5.3)
PROT SERPL-MCNC: 6.3 G/DL — SIGNIFICANT CHANGE UP (ref 6–8.3)
PROT UR-MCNC: 10 — SIGNIFICANT CHANGE UP
RBC # BLD: 3.83 M/UL — LOW (ref 4.2–5.8)
RBC # FLD: 18.6 % — HIGH (ref 10.3–14.5)
SODIUM SERPL-SCNC: 140 MMOL/L — SIGNIFICANT CHANGE UP (ref 135–145)
SP GR SPEC: 1.01 — SIGNIFICANT CHANGE UP (ref 1–1.04)
TSH SERPL-MCNC: 1.84 UIU/ML — SIGNIFICANT CHANGE UP (ref 0.27–4.2)
UROBILINOGEN FLD QL: NORMAL — SIGNIFICANT CHANGE UP
WBC # BLD: 8.27 K/UL — SIGNIFICANT CHANGE UP (ref 3.8–10.5)
WBC # FLD AUTO: 8.27 K/UL — SIGNIFICANT CHANGE UP (ref 3.8–10.5)

## 2018-11-12 NOTE — ED PROVIDER NOTE - ATTENDING CONTRIBUTION TO CARE
pt with DM2 on Metformin and insulin.  Found to have a hypoglycemic episode at home with a Glc in the low 20s.  Pt unable to provide any history however after D50 here in the ED the patient is back to baseline per family.  They do not report a change in meds or PO intake.    Gen: Well appearing in NAD  Head: NC/AT  Neck: trachea midline  Resp:  No distress CTAB  CV: RRR  Ext: no deformities  Neuro:  Alert appears non focal  Skin:  Warm and dry as visualized    Pt with an episode of hypoglycemia - need to consider infection vs not enough PO intake or too much insulin.  Confirmed that only on metformin and insulin so will not need prolonged monitoring.  Will eval for infection and give PO food

## 2018-11-12 NOTE — ED PROVIDER NOTE - OBJECTIVE STATEMENT
84yo M pmhx DM, HTN, Prostate Ca, CAD p/w CC AMS/hypoglycemia. Pt. unable to provide history. Son states that he went to check on pt. tonight and he was acting abnormally and appeared lethargic, not speaking, called EMS and finger stick read as "low". Pt. states he does not know what medications pt. is on.

## 2018-11-12 NOTE — ED PROVIDER NOTE - MEDICAL DECISION MAKING DETAILS
84yo M pmhx HTN HLD DM CAD prostate ca p/w CC hypoglycemia - given amp of d50, will send labs and monitor in ED.

## 2018-11-13 ENCOUNTER — OTHER (OUTPATIENT)
Age: 83
End: 2018-11-13

## 2018-11-13 LAB
BACTERIA UR CULT: SIGNIFICANT CHANGE UP
SPECIMEN SOURCE: SIGNIFICANT CHANGE UP

## 2018-11-14 ENCOUNTER — OTHER (OUTPATIENT)
Age: 83
End: 2018-11-14

## 2018-11-14 ENCOUNTER — CLINICAL ADVICE (OUTPATIENT)
Age: 83
End: 2018-11-14

## 2018-11-14 NOTE — ED POST DISCHARGE NOTE - RESULT SUMMARY
culture grew 3 or more types of organims which indicate collection contamination, consider recollection only if clinically indicated. Patient contact # [phone] patient gave me daughters  phone # [phone] Discussed with daughter UCX results and patient should have repeat UA/UCX. Daughter states father having no symptoms of UTI.

## 2018-11-27 ENCOUNTER — OUTPATIENT (OUTPATIENT)
Dept: OUTPATIENT SERVICES | Facility: HOSPITAL | Age: 83
LOS: 1 days | End: 2018-11-27
Payer: COMMERCIAL

## 2018-11-27 ENCOUNTER — RESULT REVIEW (OUTPATIENT)
Age: 83
End: 2018-11-27

## 2018-11-27 ENCOUNTER — APPOINTMENT (OUTPATIENT)
Dept: ULTRASOUND IMAGING | Facility: IMAGING CENTER | Age: 83
End: 2018-11-27
Payer: COMMERCIAL

## 2018-11-27 DIAGNOSIS — K63.9 DISEASE OF INTESTINE, UNSPECIFIED: ICD-10-CM

## 2018-11-27 PROCEDURE — 38505 NEEDLE BIOPSY LYMPH NODES: CPT

## 2018-11-27 PROCEDURE — 76942 ECHO GUIDE FOR BIOPSY: CPT | Mod: 26

## 2018-11-27 PROCEDURE — 88185 FLOWCYTOMETRY/TC ADD-ON: CPT

## 2018-11-27 PROCEDURE — 76942 ECHO GUIDE FOR BIOPSY: CPT

## 2018-11-27 PROCEDURE — 87205 SMEAR GRAM STAIN: CPT

## 2018-11-27 PROCEDURE — 88184 FLOWCYTOMETRY/ TC 1 MARKER: CPT

## 2018-12-06 ENCOUNTER — OUTPATIENT (OUTPATIENT)
Dept: OUTPATIENT SERVICES | Facility: HOSPITAL | Age: 83
LOS: 1 days | Discharge: HOME | End: 2018-12-06

## 2018-12-06 DIAGNOSIS — C85.10 UNSPECIFIED B-CELL LYMPHOMA, UNSPECIFIED SITE: ICD-10-CM

## 2018-12-09 ENCOUNTER — FORM ENCOUNTER (OUTPATIENT)
Age: 83
End: 2018-12-09

## 2018-12-09 NOTE — PATIENT PROFILE ADULT. - FUNCTIONAL SCREEN CURRENT LEVEL: EATING, MLM
Progress Notes by Jess Arroyo MD at 03/23/17 07:00 AM     Author:  Jess Arroyo MD Service:  (none) Author Type:  Physician     Filed:  03/23/17 07:03 AM Encounter Date:  3/19/2017 Status:  Signed     :  Jess Arroyo MD (Physician)            Last visit with JESS ARROYO was on 03/19/2017 at 11:10 AM in [facility] SEQ  Last visit with [facility] was on 03/19/2017 at 11:10 AM in [facility] SEQ  Match done based on reference date of today 3/23/17      SUBJECTIVE:    History per parent    Patient is a 7 year old male who presents with complains of upper respiratory symptoms that has been present for[BP1.1T] 2 weeks[BP1.1M].  Symptoms started with[BP1.1T] strep and placed on amox for 10 days.   Thursday developed Flu like symtoms with fever chills bodyaches and cough and sore throat.  Symptoms still persist.[BP1.1M]      The above symptoms are associated with the following:       Throat Symptoms: No s , drooling, throat closing sensation or difficulty breathing..    Cough Symptoms:[BP1.1T] Cough: moderate, congested, deep and persistent[BP1.1M].  No chest pain, difficulty breathing, wheezing.      ROS:     FEVER:  no fevers.    APPETITE: No change in appetite, nausea, vomiting, abdominal pain or diarrhea..    EYE Problems: none.    EAR Problems: Right Ear:  no ear problems. Left Ear :no ear problems.    NECK Issues:  No neck swelling, gland tenderness, neck stiffness or masses..    SKIN:  No rashes.    PMH:    Patient Active Problem List    Diagnosis    • Routine infant or child health check       Social History:         ALLERGIES:    Augmentin [amox-clavul]      MEDICATION:      Current Outpatient Prescriptions:[BP1.1T]      nonoe[BP1.1M]    OBJECTIVE:    Pulse 106, temperature 98 °F (36.7 °C), temperature source Temporal, resp. rate 26, weight 54 lb (24.5 kg), SpO2 96 %.    General appearance: Alert, well hydrated, well nourished in no apparent distress.    Right Ear Exam:  External auditory canal without erythema, swelling, or discharge. TM's[BP1.1T] red/dull[BP1.1M].  Left Ear Exam:  External auditory canal without erythema, swelling, or discharge. TM's[BP1.1T]red/dull[BP1.1M]  Eyes:  PERRLA. EOMI. Conjunctiva: Clear without injection or discharge  Throat: moist without edema, or exudates or abscess.  Neck: supple without cervical lymphadenopathy.  No meningismus.  Lungs: clear to auscultation without  rhonchi, rales, retractions or stridors. +wheezing at bases.  Heart:  Regular rate and rhythm.  Extremities:  No cyanosis, clubbing or edema.  Skin: smooth without rash or edema.    Lab Test:  None.      ASSESSMENT:[BP1.1T]  Bilateral oM[BP1.1M]  Sinusitis.  bronchospams    PLAN:  · See orders for medications prescribed. Discussed with parent the side effects of medication prescribed and precaution to take.  · May use OTC analgesics PRN.  · Recommend rest and to increase fluid intake.  · Symptoms should improve gradually over the next 3-5 days. If symptoms do not improve in the next 3-5 days, persist, worsen, or develops new symptoms that were not present at the time of visit then the patient will need to reassessed.  If the patient cannot be seen by their PCP, then the patient is recommended to return to walk-in care or to seek care in Emergency Room if walk-in care is not available.  ·  Parent to call my office with any questions/concerns regarding treatment.    · Parent voiced understanding of the above treatment and plan.      Electronically Signed by:    Jess Arroyo MD , 3/23/2017  Last visit with JESS ARROYO was on No match found  Last visit with [facility] was on No match found[BP1.1T]              Revision History        User Key Date/Time User Provider Type Action    > BP1.1 03/23/17 07:03 AM Jess Arroyo MD Physician Sign    M - Manual, T - Template             (0) independent

## 2018-12-10 ENCOUNTER — APPOINTMENT (OUTPATIENT)
Dept: HEMATOLOGY ONCOLOGY | Facility: CLINIC | Age: 83
End: 2018-12-10
Payer: MEDICARE

## 2018-12-10 ENCOUNTER — APPOINTMENT (OUTPATIENT)
Dept: ULTRASOUND IMAGING | Facility: IMAGING CENTER | Age: 83
End: 2018-12-10
Payer: COMMERCIAL

## 2018-12-10 ENCOUNTER — RESULT REVIEW (OUTPATIENT)
Age: 83
End: 2018-12-10

## 2018-12-10 ENCOUNTER — OUTPATIENT (OUTPATIENT)
Dept: OUTPATIENT SERVICES | Facility: HOSPITAL | Age: 83
LOS: 1 days | End: 2018-12-10
Payer: COMMERCIAL

## 2018-12-10 VITALS
DIASTOLIC BLOOD PRESSURE: 47 MMHG | TEMPERATURE: 98.1 F | SYSTOLIC BLOOD PRESSURE: 88 MMHG | RESPIRATION RATE: 16 BRPM | OXYGEN SATURATION: 98 % | HEART RATE: 91 BPM

## 2018-12-10 VITALS — HEIGHT: 67 IN | WEIGHT: 155.97 LBS | BODY MASS INDEX: 24.48 KG/M2

## 2018-12-10 DIAGNOSIS — R60.0 LOCALIZED EDEMA: ICD-10-CM

## 2018-12-10 DIAGNOSIS — Z86.19 PERSONAL HISTORY OF OTHER INFECTIOUS AND PARASITIC DISEASES: ICD-10-CM

## 2018-12-10 DIAGNOSIS — C61 MALIGNANT NEOPLASM OF PROSTATE: ICD-10-CM

## 2018-12-10 DIAGNOSIS — Z78.9 OTHER SPECIFIED HEALTH STATUS: ICD-10-CM

## 2018-12-10 DIAGNOSIS — D64.9 ANEMIA, UNSPECIFIED: ICD-10-CM

## 2018-12-10 LAB
APTT BLD: 28.9 SEC
BASOPHILS # BLD AUTO: 0.1 K/UL — SIGNIFICANT CHANGE UP (ref 0–0.2)
BASOPHILS NFR BLD AUTO: 0.8 % — SIGNIFICANT CHANGE UP (ref 0–2)
CEA SERPL-MCNC: 13.7 NG/ML
EOSINOPHIL # BLD AUTO: 0 K/UL — SIGNIFICANT CHANGE UP (ref 0–0.5)
EOSINOPHIL NFR BLD AUTO: 0 % — SIGNIFICANT CHANGE UP (ref 0–6)
FERRITIN SERPL-MCNC: 187 NG/ML
FOLATE SERPL-MCNC: 8.6 NG/ML
HCT VFR BLD CALC: 25.6 % — LOW (ref 39–50)
HGB BLD-MCNC: 8 G/DL — LOW (ref 13–17)
INR PPP: 1.26 RATIO
LYMPHOCYTES # BLD AUTO: 2.9 K/UL — SIGNIFICANT CHANGE UP (ref 1–3.3)
LYMPHOCYTES # BLD AUTO: 39.9 % — SIGNIFICANT CHANGE UP (ref 13–44)
MCHC RBC-ENTMCNC: 23.4 PG — LOW (ref 27–34)
MCHC RBC-ENTMCNC: 31.3 G/DL — LOW (ref 32–36)
MCV RBC AUTO: 74.8 FL — LOW (ref 80–100)
MONOCYTES # BLD AUTO: 0.7 K/UL — SIGNIFICANT CHANGE UP (ref 0–0.9)
MONOCYTES NFR BLD AUTO: 9.5 % — SIGNIFICANT CHANGE UP (ref 2–14)
NEUTROPHILS # BLD AUTO: 3.6 K/UL — SIGNIFICANT CHANGE UP (ref 1.8–7.4)
NEUTROPHILS NFR BLD AUTO: 49.8 % — SIGNIFICANT CHANGE UP (ref 43–77)
PLATELET # BLD AUTO: 295 K/UL — SIGNIFICANT CHANGE UP (ref 150–400)
PT BLD: 14.5 SEC
RBC # BLD: 3.43 M/UL — LOW (ref 4.2–5.8)
RBC # FLD: 18.7 % — HIGH (ref 10.3–14.5)
VIT B12 SERPL-MCNC: 588 PG/ML
WBC # BLD: 7.3 K/UL — SIGNIFICANT CHANGE UP (ref 3.8–10.5)
WBC # FLD AUTO: 7.3 K/UL — SIGNIFICANT CHANGE UP (ref 3.8–10.5)

## 2018-12-10 PROCEDURE — 93970 EXTREMITY STUDY: CPT | Mod: 26

## 2018-12-10 PROCEDURE — 99205 OFFICE O/P NEW HI 60 MIN: CPT

## 2018-12-10 PROCEDURE — 93970 EXTREMITY STUDY: CPT

## 2018-12-10 NOTE — REVIEW OF SYSTEMS
[Fatigue] : fatigue [Recent Change In Weight] : ~T recent weight change [Dysphagia] : dysphagia [Difficulty Walking] : difficulty walking [Negative] : Allergic/Immunologic [Joint Pain] : joint pain [Muscle Weakness] : muscle weakness [Fever] : no fever [Chills] : no chills [Night Sweats] : no night sweats [Vision Problems] : no vision problems [Loss of Hearing] : no loss of hearing [Nosebleeds] : no nosebleeds [Hoarseness] : no hoarseness [Odynophagia] : no odynophagia [Mucosal Pain] : no mucosal pain [Chest Pain] : no chest pain [Palpitations] : no palpitations [Leg Claudication] : no intermittent leg claudication [Lower Ext Edema] : no lower extremity edema [Shortness Of Breath] : no shortness of breath [Wheezing] : no wheezing [Abdominal Pain] : no abdominal pain [Vomiting] : no vomiting [Constipation] : no constipation [Diarrhea] : no diarrhea [Dysuria] : no dysuria [Incontinence] : no incontinence [Skin Rash] : no skin rash [Skin Wound] : no skin wound [Dizziness] : no dizziness [Fainting] : no fainting [Suicidal] : not suicidal [Insomnia] : no insomnia [Anxiety] : no anxiety [Depression] : no depression [Proptosis] : no proptosis [Hot Flashes] : no hot flashes [Easy Bleeding] : no tendency for easy bleeding [Easy Bruising] : no tendency for easy bruising [Swollen Glands] : no swollen glands [FreeTextEntry2] : lost 30 lbs since June 2018 -has had pain with eating [FreeTextEntry7] : no melena, no BRPBR [FreeTextEntry8] : no hematuria [FreeTextEntry9] : chronic back pain [de-identified] : peripheral neuropathy. Not ambulating well due to weakness -has commode, mostly sedentary

## 2018-12-10 NOTE — HISTORY OF PRESENT ILLNESS
[de-identified] : Mr. Alvarado was referred to my office for newly diagnosed B-cell lymphoma (Non-Hodgkin's). According to his daughter, he has been complaining of abdominal pain after eating and early satiety since about June 2018. He had seen his PCP and was given antiacids, which did not help. He was admitted for abdominal pain at [facility] between 10/3/18 and 10/5/18. CT A/P done on admission showed a cecal mass with adjacent adenopathy concerning for metastasis. A colonoscopy and endoscopy were done on 10/5/18 -cecal bx showed tubular adenoma, H. pylori gastritis (which was treated) and patient was discharged with follow up with surgery (Dr. Leon). PET/CT done on 10/25/18 showed a  difficult to delineate cecal mass measuring 7.3 x 5.7 x 5.5 cm with SUV 25, and scattered non-FDG avid LN. 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 The patient was sent here for further evaluation. \par  [de-identified] : The patient is here for evaluation of newly dx'ed R axillary LN c/w indolent NHL; also has a large, high FDG avid cecal mass -colonoscopy nondiagnostic. He has been having weight loss and failure to thrive, with progressive weakness, now basically ECOG PS 4.

## 2018-12-10 NOTE — ASSESSMENT
[FreeTextEntry1] : 82 yo M with hx prostate CA (s/p XRT and seeds in 2007), now with new cecal mass and R axillary indolent lymphoma\par Also with anemia, symptomatic\par Also with LE b/l edema\par \par -LE Doppler done urgently today -NO evidence of DVT's\par \par -anemia, symptomatic -pt consented for blood transfusion, scheduled for 2U PRBC on 12/12/18\par \par -agree with Dr. Leon that patient most  likely has 2 different diseases: the indolent B-cell lymphoma in the R axillary LN and other non-FDG avid LN, which can be observed without treatment, and the colon mass which could either be a primary colon CA vs., if lymphoma, would be expected to be an aggressive lymphoma given the high SUV. Thus, would get a CT guided biopsy of the colonic mass to evaluate prior to possible resection (if aggressive lymphoma, chemo alone is indicated, but if he has colon CA he will need surgical resection). Will send some tumor markers today -LDH, uric acid, CEA as well as PT/aPTT in preparation of biopsy\par \par -anemia -microcytic, thus pt may have iron def anemia from occult bleeding from cecal tumor (which would be more consistent with colon CA); will check iron studies, B12/folate, SPEP/MAURILIO and Hb  electrophoresis given sickle-like cells on smear\par \par -will d/w Dr. Leon plan after CT-guided bx\par \par -follow up after biopsy of colon mass is done

## 2018-12-10 NOTE — CONSULT LETTER
[Dear  ___] : Dear  [unfilled], [Consult Letter:] : I had the pleasure of evaluating your patient, [unfilled]. [Please see my note below.] : Please see my note below. [Consult Closing:] : Thank you very much for allowing me to participate in the care of this patient.  If you have any questions, please do not hesitate to contact me. [Sincerely,] : Sincerely, [DrMayur  ___] : Dr. DEGROOT

## 2018-12-10 NOTE — PHYSICAL EXAM
[Capable of only limited self care, confined to bed or chair more than 50% of waking hours] : Status 3- Capable of only limited self care, confined to bed or chair more than 50% of waking hours [Normal] : affect appropriate [Thin] : thin [de-identified] : b/l LE edema up to knee levels, R>L [de-identified] : firm RUQ and RLQ abdomen, nontender [de-identified] : small cervical LN L side, 2cm R axillary LN, no inguinal LN [de-identified] : weak, in wheelchair

## 2018-12-10 NOTE — RESULTS/DATA
[FreeTextEntry1] : Today's CBC (On 12/10/18) wbc 7.3 ANC 3600 ALC  2900 MoC 700 Ba 0 Hb 8.0 plt 295\par \par The peripheral smear was reviewed. It showed normal white cells, no immature cells. Red cells with moderate aniso and poikilocytosis with lupe cells, hypochromic rbc's, some sickle-like cells, few reticulocytes. Platelets normal in number and morphology. \par \par The previous medical records were reviewed\par \par RADIOLOGY\par On 10/25/18 PET/CT HEAD/NECK: Physiologic FDG activity in visualized brain, head, and neck. \par There is non-FDG avid bilateral cervical lymphadenopathy. For reference, \par a right level III lymph node measures 1.4 x 1.2 cm (image 33).\par \par CHEST: No abnormal FDG activity. Non-FDG avid bilateral axillary lymph \par nodes are noted. For reference, a right axillary lymph node measures 2.0 \par x 1.2 cm (image 56). No mediastinal lymphadenopathy. Coronary artery \par calcifications.\par \par LUNGS: No abnormal FDG activity. No lung nodule. \par \par PLEURA/PERICARDIUM: No abnormal FDG activity. No effusion.    \par \par HEPATOBILIARY/PANCREAS:  Physiologic FDG activity. Liver SUV mean is 2.2. \par There is a 3 mm calcified granuloma within the left hepatic lobe, \par unchanged.\par \par SPLEEN: Physiologic FDG activity. Normal in size.    \par \par ADRENAL GLANDS: There is nodular thickening of the bilateral adrenal \par glands with minimal FDG uptake. The right adrenal gland has SUV 3.1 \par (image 98) in the left adrenal gland has SUV 2.9 (image 90). No adrenal \par nodule.\par \par KIDNEYS/URINARY BLADDER: Physiologic excreted FDG activity.\par \par REPRODUCTIVE ORGANS: No abnormal FDG activity. Prostatic radiation seeds \par are noted.\par \par ABDOMINOPELVIC NODES: Mildly FDG avid mesenteric adenopathy is noted. For \par reference, a mesenteric node adjacent to the cecal mass measures 2.8 x \par 1.9 cm (SUV 3.6, image 138) and is unchanged in size from CT abdomen and \par pelvis dated 10/3/2018. Non-FDG avid small retroperitoneal lymph nodes \par are noted. For reference, a left periaortic lymph node measures 1.0 x 0.8 \par cm (image 112).\par \par BOWEL/PERITONEUM/MESENTERY: Difficult to delineate cecal mass measures \par 7.3 x 5.7 x 5.5 cm (image 139), unchanged in size from CT abdomen pelvis \par dated 10/3/2018, demonstrates heterogeneous FDG activity, most intense in \par the superolateral aspect of the mass (SUV 25.0; image 136). \par \par BONES/SOFT TISSUES: No abnormal FDG activity. Degenerative changes of the \par axial spine. \par \par IMPRESSION: Abnormal FDG-PET/CT scan.\par \par 1. Cecal mass with heterogeneous FDG activity, most intense \par supralaterally, and mildly FDG-avid adjacent mesenteric adenopathy, \par unchanged as compared to CT dated 10/3/2018. Primary consideration is \par colon cancer with metastatic disease.\par \par 2. Minimally FDG avid bilateral nodular adrenal thickening is probably \par benign. \par \par 3. Non-FDG avid retroperitoneal and cervical lymphadenopathy is \par nonspecific. Further evaluation may be performed with ultrasound and \par percutaneous needle biopsy, as indicated.\par \par GI work up \par On 10/5/18 Upper EGD Impression:          - Dilation in the entire esophagus. Biopsied for \par                      eosinophilic esophagitis.\par                      - 3 cm Hill Grade III hiatus hernia.\par                      - Gastritis. Biopsied.\par                      - Non-bleeding gastric ulcer with no stigmata of \par                      bleeding.\par                      - Normal duodenal bulb and 2nd part of the duodenum.\par On 10/5/18 Colonoscopy Impression:          - Non-bleeding internal hemorrhoids.\par                      - Liquid stool in the entire examined colon, which was \par                      aspirated.\par                      - Likely malignant tumor in the cecum. Biopsied. 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 follicles are absent.  There is no large cell component, no necrosis and no evidence of carcinoma.  Immunohistochemical stains performed on block 1B shows sheets of CD20(+), CD79a(+), PAX5(+), BCL2(+), CD43(+), CD23(+) B-lymphocytes which are  CD10(-), BCL6(-), cyclinD1(-), CD21(-). The Ki67 proliferative index is low (<5%.). \par \par On 10/5/18 Colonoscopy/endoscopy specimens: Final Diagnosis\par 1. Colon, cecum, mass, biopsy \par      - Superficial fragments of inflamed tubular adenoma, see note \par \par 2. Stomach, biopsy\par      - Gastric antral mucosa with Helicobacter pylori-associated chronic active gastritis\par        (Warthin-Starry stain)\par      - Negative for intestinal metaplasia\par \par 3. Esophagus, distal, biopsy \par      - Squamous epithelium with no significant diagnostic alterations\par      - Negative for intraepithelial eosinophilia\par      - Negative for glandular mucosa\par \par 4. Esophagus, proximal, biopsy \par      - Squamous epithelium with no significant diagnostic alterations\par      - Negative for intraepithelial eosinophilia\par      - Negative for glandular mucosa\par

## 2018-12-11 ENCOUNTER — OUTPATIENT (OUTPATIENT)
Dept: OUTPATIENT SERVICES | Facility: HOSPITAL | Age: 83
LOS: 1 days | End: 2018-12-11
Payer: COMMERCIAL

## 2018-12-11 LAB
ALBUMIN MFR SERPL ELPH: 42.3 %
ALBUMIN SERPL ELPH-MCNC: 2.9 G/DL
ALBUMIN SERPL-MCNC: 2.2 G/DL
ALBUMIN/GLOB SERPL: 0.7 RATIO
ALP BLD-CCNC: 60 U/L
ALPHA1 GLOB MFR SERPL ELPH: 10.4 %
ALPHA1 GLOB SERPL ELPH-MCNC: 0.5 G/DL
ALPHA2 GLOB MFR SERPL ELPH: 22.5 %
ALPHA2 GLOB SERPL ELPH-MCNC: 1.2 G/DL
ALT SERPL-CCNC: 6 U/L
ANION GAP SERPL CALC-SCNC: 12 MMOL/L
AST SERPL-CCNC: 8 U/L
B-GLOBULIN MFR SERPL ELPH: 11.3 %
B-GLOBULIN SERPL ELPH-MCNC: 0.6 G/DL
BILIRUB SERPL-MCNC: 0.5 MG/DL
BUN SERPL-MCNC: 10 MG/DL
CALCIUM SERPL-MCNC: 8.4 MG/DL
CHLORIDE SERPL-SCNC: 101 MMOL/L
CO2 SERPL-SCNC: 27 MMOL/L
CREAT SERPL-MCNC: 0.65 MG/DL
DEPRECATED KAPPA LC FREE/LAMBDA SER: 1.31 RATIO
DEPRECATED KAPPA LC FREE/LAMBDA SER: 1.31 RATIO
GAMMA GLOB FLD ELPH-MCNC: 0.7 G/DL
GAMMA GLOB MFR SERPL ELPH: 13.5 %
GLUCOSE SERPL-MCNC: 176 MG/DL
IGA SER QL IEP: 157 MG/DL
IGG SER QL IEP: 768 MG/DL
IGM SER QL IEP: 11 MG/DL
INTERPRETATION SERPL IEP-IMP: NORMAL
IRON SATN MFR SERPL: 7 %
IRON SERPL-MCNC: 13 UG/DL
KAPPA LC CSF-MCNC: 2.18 MG/DL
KAPPA LC CSF-MCNC: 2.18 MG/DL
KAPPA LC SERPL-MCNC: 2.85 MG/DL
KAPPA LC SERPL-MCNC: 2.85 MG/DL
LDH SERPL-CCNC: 164 U/L
M PROTEIN SPEC IFE-MCNC: NORMAL
POTASSIUM SERPL-SCNC: 3.7 MMOL/L
PROT SERPL-MCNC: 5.2 G/DL
SODIUM SERPL-SCNC: 140 MMOL/L
TIBC SERPL-MCNC: 177 UG/DL
UIBC SERPL-MCNC: 164 UG/DL
URATE SERPL-MCNC: 7.2 MG/DL

## 2018-12-11 PROCEDURE — 86923 COMPATIBILITY TEST ELECTRIC: CPT

## 2018-12-12 ENCOUNTER — APPOINTMENT (OUTPATIENT)
Dept: INFUSION THERAPY | Facility: HOSPITAL | Age: 83
End: 2018-12-12

## 2018-12-12 LAB
HGB A MFR BLD: 97.6 %
HGB A2 MFR BLD: 2.4 %
HGB FRACT BLD-IMP: NORMAL

## 2018-12-13 DIAGNOSIS — Z51.89 ENCOUNTER FOR OTHER SPECIFIED AFTERCARE: ICD-10-CM

## 2018-12-26 ENCOUNTER — APPOINTMENT (OUTPATIENT)
Dept: CT IMAGING | Facility: HOSPITAL | Age: 83
End: 2018-12-26

## 2019-01-18 DIAGNOSIS — C85.10 UNSPECIFIED B-CELL LYMPHOMA, UNSPECIFIED SITE: ICD-10-CM

## 2019-01-21 ENCOUNTER — APPOINTMENT (OUTPATIENT)
Dept: SURGICAL ONCOLOGY | Facility: CLINIC | Age: 84
End: 2019-01-21
Payer: MEDICARE

## 2019-01-21 VITALS
HEART RATE: 74 BPM | DIASTOLIC BLOOD PRESSURE: 60 MMHG | HEIGHT: 67 IN | BODY MASS INDEX: 24.17 KG/M2 | SYSTOLIC BLOOD PRESSURE: 100 MMHG | OXYGEN SATURATION: 98 % | WEIGHT: 154 LBS

## 2019-01-21 DIAGNOSIS — K63.9 DISEASE OF INTESTINE, UNSPECIFIED: ICD-10-CM

## 2019-01-21 PROCEDURE — 99215 OFFICE O/P EST HI 40 MIN: CPT

## 2019-01-21 NOTE — CONSULT LETTER
[Dear  ___] : Dear  [unfilled], [Consult Letter:] : I had the pleasure of evaluating your patient, [unfilled]. [Please see my note below.] : Please see my note below. [Consult Closing:] : Thank you very much for allowing me to participate in the care of this patient.  If you have any questions, please do not hesitate to contact me. [Sincerely,] : Sincerely, [DrMayur  ___] : Dr. DEGROOT [FreeTextEntry2] : Zofia Duckworth MD  [FreeTextEntry1] : Mr. Alvarado is a 83 year old male who returns for follow up after an initial consultation on 10/22/18 (following a recent admission to [facility]).  His history is significant for prostate cancer (s/p rads, seeding), BPH, GERD, DM, CAD s/p stent (2006- currently OFF plavix).  He presented to [facility] on 10/3/18 with abdominal pain x 3 months and a 30 pound weight loss according to his daughter.  He underwent a CT abd on 10/3/18 which revealed a 2.6x2.0cm cecal mass with adjacent adenopathy concerning for metastasis.  On 10/5/18 he had a colonoscopy at which time the mass was biopsied  with final pathology of inflamed tubular adenoma.  He underwent a CT chest on 10/19/18 which revealed supraclavicular, axillary and retroperitoneal adenopathy.\par \par I referred him for a PET/CT which was done on 10/25/18.  Study demonstrated a cecal mass with heterogeneous FDG activity, and mildly FDG avid adjacent mesenteric lymphadenopathy.  There is minimal FDG avid bilateral nodular adrenal thickening stated to be probably benign.  There are non specific, non FDG avid retroperitoneal, cervical lymph and axillary lymph nodes nodes as well. There is no mediastinal lymphadenopathy.   He underwent a Right axillary LN biopsy which was positive for malignancy.  He has since met with Heme/Onc Dr. 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 Arshad who believes he can be cleared for surgery.  Today she is without any complaints.  Tolerating PO intake without nausea, vomiting or abdominal pain. Reports daily BM's without BRBPR and passing flatus. Denies fever or chills. \par \par On exam, there are no palpable abdominal masses.\par \par We discussed the plan for surgical extirpation of the cecal mass. Given Mr. Alvarado's decreased functional status baseline, we discussed the possibility of an end  ileostomy. We discussed  the associated risks, benefits, and alternatives of the procedure. We also discussed potential complications and postoperative expectations.Mr. Alvarado and his family express understanding and agree to proceed.  He will continue with preoperative physical therapy. I am also arranging for a repeat CT of the abdomen and pelvis, and a preoperative consultation with enterostomal nursing.  He will also need necessary medical risk stratification. [FreeTextEntry3] : Melo Leon MD\par Surgical Oncology\par [facility]\par Office: [phone]\par Cell: [phone]

## 2019-01-21 NOTE — PHYSICAL EXAM
[Normal] : supple, no neck mass and thyroid not enlarged [Normal Supraclavicular Lymph Nodes] : normal supraclavicular lymph nodes [Normal Groin Lymph Nodes] : normal groin lymph nodes [Normal] : oriented to person, place and time, with appropriate affect [de-identified] : Not distended, non tender, no masses, no hepatosplenomegaly

## 2019-01-21 NOTE — ASSESSMENT
[FreeTextEntry1] : We discussed the plan for surgical extirpation of the cecal mass. Given Mr. Alvarado's decreased functional status baseline, we discussed the possibility of an end  ileostomy. We discussed  the associated risks, benefits, and alternatives of the procedure. We also discussed potential complications and postoperative expectations.Mr. Alvarado and his family express understanding and agree to proceed.  He will continue with preoperative physical therapy. I am also arranging for a repeat CT of the abdomen and pelvis, and a preoperative consultation with enterostomal nursing.  He will also need necessary medical risk stratification.\par

## 2019-01-28 ENCOUNTER — RX RENEWAL (OUTPATIENT)
Age: 84
End: 2019-01-28

## 2019-01-28 ENCOUNTER — OTHER (OUTPATIENT)
Age: 84
End: 2019-01-28

## 2019-01-29 ENCOUNTER — RX RENEWAL (OUTPATIENT)
Age: 84
End: 2019-01-29

## 2019-01-31 ENCOUNTER — APPOINTMENT (OUTPATIENT)
Dept: CT IMAGING | Facility: IMAGING CENTER | Age: 84
End: 2019-01-31
Payer: MEDICARE

## 2019-01-31 ENCOUNTER — OUTPATIENT (OUTPATIENT)
Dept: OUTPATIENT SERVICES | Facility: HOSPITAL | Age: 84
LOS: 1 days | End: 2019-01-31
Payer: COMMERCIAL

## 2019-01-31 ENCOUNTER — OUTPATIENT (OUTPATIENT)
Dept: OUTPATIENT SERVICES | Facility: HOSPITAL | Age: 84
LOS: 1 days | End: 2019-01-31
Payer: MEDICARE

## 2019-01-31 VITALS
DIASTOLIC BLOOD PRESSURE: 64 MMHG | WEIGHT: 160.06 LBS | HEIGHT: 67 IN | RESPIRATION RATE: 16 BRPM | TEMPERATURE: 98 F | HEART RATE: 71 BPM | OXYGEN SATURATION: 98 % | SYSTOLIC BLOOD PRESSURE: 126 MMHG

## 2019-01-31 DIAGNOSIS — D12.6 BENIGN NEOPLASM OF COLON, UNSPECIFIED: ICD-10-CM

## 2019-01-31 DIAGNOSIS — Z95.5 PRESENCE OF CORONARY ANGIOPLASTY IMPLANT AND GRAFT: ICD-10-CM

## 2019-01-31 DIAGNOSIS — I25.10 ATHEROSCLEROTIC HEART DISEASE OF NATIVE CORONARY ARTERY WITHOUT ANGINA PECTORIS: Chronic | ICD-10-CM

## 2019-01-31 DIAGNOSIS — E11.9 TYPE 2 DIABETES MELLITUS WITHOUT COMPLICATIONS: ICD-10-CM

## 2019-01-31 DIAGNOSIS — K63.9 DISEASE OF INTESTINE, UNSPECIFIED: ICD-10-CM

## 2019-01-31 DIAGNOSIS — Q24.8 OTHER SPECIFIED CONGENITAL MALFORMATIONS OF HEART: Chronic | ICD-10-CM

## 2019-01-31 DIAGNOSIS — Z98.890 OTHER SPECIFIED POSTPROCEDURAL STATES: Chronic | ICD-10-CM

## 2019-01-31 DIAGNOSIS — I10 ESSENTIAL (PRIMARY) HYPERTENSION: ICD-10-CM

## 2019-01-31 LAB
ANION GAP SERPL CALC-SCNC: 15 MMO/L — HIGH (ref 7–14)
BLD GP AB SCN SERPL QL: NEGATIVE — SIGNIFICANT CHANGE UP
BUN SERPL-MCNC: 8 MG/DL — SIGNIFICANT CHANGE UP (ref 7–23)
CALCIUM SERPL-MCNC: 9.1 MG/DL — SIGNIFICANT CHANGE UP (ref 8.4–10.5)
CHLORIDE SERPL-SCNC: 96 MMOL/L — LOW (ref 98–107)
CO2 SERPL-SCNC: 29 MMOL/L — SIGNIFICANT CHANGE UP (ref 22–31)
CREAT SERPL-MCNC: 0.62 MG/DL — SIGNIFICANT CHANGE UP (ref 0.5–1.3)
GLUCOSE SERPL-MCNC: 210 MG/DL — HIGH (ref 70–99)
HBA1C BLD-MCNC: 6.2 % — HIGH (ref 4–5.6)
HCT VFR BLD CALC: 31.7 % — LOW (ref 39–50)
HGB BLD-MCNC: 9.5 G/DL — LOW (ref 13–17)
MCHC RBC-ENTMCNC: 24.6 PG — LOW (ref 27–34)
MCHC RBC-ENTMCNC: 30 % — LOW (ref 32–36)
MCV RBC AUTO: 82.1 FL — SIGNIFICANT CHANGE UP (ref 80–100)
NRBC # FLD: 0 K/UL — LOW (ref 25–125)
PLATELET # BLD AUTO: 331 K/UL — SIGNIFICANT CHANGE UP (ref 150–400)
PMV BLD: 9.3 FL — SIGNIFICANT CHANGE UP (ref 7–13)
POTASSIUM SERPL-MCNC: 4.1 MMOL/L — SIGNIFICANT CHANGE UP (ref 3.5–5.3)
POTASSIUM SERPL-SCNC: 4.1 MMOL/L — SIGNIFICANT CHANGE UP (ref 3.5–5.3)
RBC # BLD: 3.86 M/UL — LOW (ref 4.2–5.8)
RBC # FLD: 18.5 % — HIGH (ref 10.3–14.5)
RH IG SCN BLD-IMP: POSITIVE — SIGNIFICANT CHANGE UP
SODIUM SERPL-SCNC: 140 MMOL/L — SIGNIFICANT CHANGE UP (ref 135–145)
WBC # BLD: 7.63 K/UL — SIGNIFICANT CHANGE UP (ref 3.8–10.5)
WBC # FLD AUTO: 7.63 K/UL — SIGNIFICANT CHANGE UP (ref 3.8–10.5)

## 2019-01-31 PROCEDURE — 74177 CT ABD & PELVIS W/CONTRAST: CPT

## 2019-01-31 PROCEDURE — 93010 ELECTROCARDIOGRAM REPORT: CPT

## 2019-01-31 PROCEDURE — 82565 ASSAY OF CREATININE: CPT

## 2019-01-31 PROCEDURE — 74177 CT ABD & PELVIS W/CONTRAST: CPT | Mod: 26

## 2019-01-31 RX ORDER — INSULIN NPH HUM/REG INSULIN HM 70-30/ML
0 VIAL (ML) SUBCUTANEOUS
Qty: 0 | Refills: 0 | COMMUNITY

## 2019-01-31 RX ORDER — CYCLOBENZAPRINE HYDROCHLORIDE 10 MG/1
1 TABLET, FILM COATED ORAL
Qty: 0 | Refills: 0 | COMMUNITY

## 2019-01-31 RX ORDER — FELODIPINE 5 MG/1
1 TABLET, FILM COATED, EXTENDED RELEASE ORAL
Qty: 0 | Refills: 0 | COMMUNITY

## 2019-01-31 RX ORDER — LIDOCAINE 4 G/100G
1 CREAM TOPICAL
Qty: 0 | Refills: 0 | COMMUNITY

## 2019-01-31 RX ORDER — TAMSULOSIN HYDROCHLORIDE 0.4 MG/1
2 CAPSULE ORAL
Qty: 0 | Refills: 0 | COMMUNITY

## 2019-01-31 RX ORDER — ATORVASTATIN CALCIUM 80 MG/1
1 TABLET, FILM COATED ORAL
Qty: 0 | Refills: 0 | COMMUNITY

## 2019-01-31 RX ORDER — FUROSEMIDE 40 MG
1 TABLET ORAL
Qty: 0 | Refills: 0 | COMMUNITY

## 2019-01-31 RX ORDER — INSULIN ASPART 100 [IU]/ML
20 INJECTION, SUSPENSION SUBCUTANEOUS
Qty: 0 | Refills: 0 | COMMUNITY

## 2019-01-31 RX ORDER — GABAPENTIN 400 MG/1
1 CAPSULE ORAL
Qty: 0 | Refills: 0 | COMMUNITY

## 2019-01-31 RX ORDER — VALSARTAN 80 MG/1
1 TABLET ORAL
Qty: 0 | Refills: 0 | COMMUNITY

## 2019-01-31 RX ORDER — METFORMIN HYDROCHLORIDE 850 MG/1
1 TABLET ORAL
Qty: 0 | Refills: 0 | COMMUNITY

## 2019-01-31 RX ORDER — FINASTERIDE 5 MG/1
1 TABLET, FILM COATED ORAL
Qty: 0 | Refills: 0 | COMMUNITY

## 2019-01-31 RX ORDER — ATENOLOL 25 MG/1
1 TABLET ORAL
Qty: 0 | Refills: 0 | COMMUNITY

## 2019-01-31 NOTE — H&P PST ADULT - PROBLEM SELECTOR PLAN 1
Pt scheduled for partial colectomy, possible ostomy on 2/13/2019.   Pre-op instructions given, patient verbalized understanding.   Pepcid given to patient for GI prophylaxis.   Chlorhexidine wash provided, instructions given.   Bloodwork pending.  EKG done, in the chart. NSR  Medical evaluation requested by surgeon, pending medical clearance. Pt scheduled for partial colectomy, possible ostomy on 2/13/2019.   Pre-op instructions given, patient verbalized understanding.   Pepcid given to patient for GI prophylaxis.   Chlorhexidine wash provided, instructions given.   Bloodwork pending.  EKG done, in the chart. NSR  Medical evaluation requested by surgeon.  Cardiac evaluation requested for PST due to METS <4, cardiac shunt, hx of HTN, CAD. Echo, Stress test and Angiogram requested.

## 2019-01-31 NOTE — H&P PST ADULT - PSH
History of prostate surgery  2007 seed implants  Interatrial cardiac shunt  ??? 2006 CAD (coronary artery disease)  1 cardiac stent 2006 was on plavix, currently pt is not on aspirin  History of prostate surgery  2007 seed implants

## 2019-01-31 NOTE — H&P PST ADULT - NEGATIVE GENERAL GENITOURINARY SYMPTOMS
no flank pain R/no nocturia/no hematuria/no flank pain L/normal urinary frequency/no bladder infections/no dysuria/no urinary hesitancy/no renal colic

## 2019-01-31 NOTE — H&P PST ADULT - PMH
Benign neoplasm of colon    Benign prostatic hypertrophy    CAD (coronary artery disease)    Diabetes  Type II  GERD (gastroesophageal reflux disease)    HLD (hyperlipidemia)    HTN (hypertension)    Lymphoma    MI (myocardial infarction)    Prostate cancer    Stented coronary artery Benign neoplasm of colon    Benign prostatic hypertrophy    CAD (coronary artery disease)    Diabetes  Type II  GERD (gastroesophageal reflux disease)    HLD (hyperlipidemia)    HTN (hypertension)    Lymphoma    MI (myocardial infarction)    Prostate cancer    Stented coronary artery  2006

## 2019-01-31 NOTE — H&P PST ADULT - NSANTHOSAYNRD_GEN_A_CORE
No. SLOEDAD screening performed.  STOP BANG Legend: 0-2 = LOW Risk; 3-4 = INTERMEDIATE Risk; 5-8 = HIGH Risk

## 2019-01-31 NOTE — H&P PST ADULT - PROBLEM SELECTOR PLAN 4
Pt has one cardiac stent, pt instructed to start on a baby aspirin.  Pt has medical clearance 2/4/2019.  Pt is going to confer with cardiologist about aspirin plan.

## 2019-01-31 NOTE — H&P PST ADULT - NEGATIVE MUSCULOSKELETAL SYMPTOMS
no stiffness/no arm pain L/no myalgia/no arthritis/no joint swelling/no muscle cramps/no muscle weakness/no neck pain/no arm pain R

## 2019-01-31 NOTE — H&P PST ADULT - ADMIT DATE
Size Of Margin In Cm: 0 Wound Care: No ointment 10 Mm Punch Excision Text: A 10 mm punch biopsy was used to make an initial incision over the lesion.  After this overlying column of skin was removed, blunt dissection was used to free the lesion from the surrounding tissues and the lesion was extirpated through the surgical opening made by the punch biopsy. 3.5 Mm Punch Excision Text: A 3.5 mm punch biopsy was used to make an initial incision over the lesion.  After this overlying column of skin was removed, blunt dissection was used to free the lesion from the surrounding tissues and the lesion was extirpated through the surgical opening made by the punch biopsy. Purse String (Intermediate) Text: Given the location of the defect and the characteristics of the surrounding skin a pursestring intermediate closure was deemed most appropriate.  Undermining was performed circumfirentially around the surgical defect.  A purstring suture was then placed and tightened. 1.5 Mm Punch Excision Text: A 1.5 mm punch biopsy was used to make an initial incision over the lesion.  After this overlying column of skin was removed, blunt dissection was used to free the lesion from the surrounding tissues and the lesion was extirpated through the surgical opening made by the punch biopsy. 2.5 Mm Punch Excision Text: A 2.5 mm punch biopsy was used to make an initial incision over the lesion.  After this overlying column of skin was removed, blunt dissection was used to free the lesion from the surrounding tissues and the lesion was extirpated through the surgical opening made by the punch biopsy. Lab Facility: [id number] Anesthesia Type: 1% lidocaine with epinephrine Epidermal Sutures: 5-0 Nylon Excision Method: 4 mm Punch Anesthesia Volume In Cc: 1.6 12 Mm Punch Excision Text: A 12 mm punch biopsy was used to make an initial incision over the lesion.  After this overlying column of skin was removed, blunt dissection was used to free the lesion from the surrounding tissues and the lesion was extirpated through the surgical opening made by the punch biopsy. 6 Mm Punch Excision Text: A 6 mm punch biopsy was used to make an initial incision over the lesion.  After this overlying column of skin was removed, blunt dissection was used to free the lesion from the surrounding tissues and the lesion was extirpated through the surgical opening made by the punch biopsy. Intermediate Repair Preamble Text (Leave Blank If You Do Not Want): Undermining was performed with blunt dissection. Excision Depth: adipose tissue Post-Care Instructions: I reviewed with the patient in detail post-care instructions. Patient is not to engage in any heavy lifting, exercise, or swimming for the next 14 days. Should the patient develop any fevers, chills, bleeding, severe pain patient will contact the office immediately. Render Post-Care Instructions In Note?: yes 3 Mm Punch Excision Text: A 3 mm punch biopsy was used to make an initial incision over the lesion.  After this overlying column of skin was removed, blunt dissection was used to free the lesion from the surrounding tissues and the lesion was extirpated through the surgical opening made by the punch biopsy. Medical Necessity Clause: This procedure was medically necessary because the lesion that was treated was: 4.5 Mm Punch Excision Text: A 4.5 mm punch biopsy was used to make an initial incision over the lesion.  After this overlying column of skin was removed, blunt dissection was used to free the lesion from the surrounding tissues and the lesion was extirpated through the surgical opening made by the punch biopsy. Billing Type: Third-Party Bill Epidermal Closure: simple interrupted Bill For Surgical Tray: no Consent was obtained from the patient. The risks and benefits to therapy were discussed in detail. Specifically, the risks of infection, scarring, bleeding, prolonged wound healing, incomplete removal, allergy to anesthesia, nerve injury and recurrence were addressed. Prior to the procedure, the treatment site was clearly identified and confirmed by the patient. All components of Universal Protocol/PAUSE Rule completed. Path Notes (To The Dermatopathologist): Please check margins. Size Of Lesion In Cm: 0.4 Suture Removal: 7 days 2 Mm Punch Excision Text: A 2 mm punch biopsy was used to make an initial incision over the lesion.  After this overlying column of skin was removed, blunt dissection was used to free the lesion from the surrounding tissues and the lesion was extirpated through the surgical opening made by the punch biopsy. Medical Necessity Clause: The excision was medically necessary because the lesion which was excised was Lab: [id number] Complex Repair Preamble Text (Leave Blank If You Do Not Want): Extensive wide undermining was performed. 8 Mm Punch Excision Text: A 8 mm punch biopsy was used to make an initial incision over the lesion.  After this overlying column of skin was removed, blunt dissection was used to free the lesion from the surrounding tissues and the lesion was extirpated through the surgical opening made by the punch biopsy. 7 Mm Punch Excision Text: A 7 mm punch biopsy was used to make an initial incision over the lesion.  After this overlying column of skin was removed, blunt dissection was used to free the lesion from the surrounding tissues and the lesion was extirpated through the surgical opening made by the punch biopsy. 5 Mm Punch Excision Text: A 5 mm punch biopsy was used to make an initial incision over the lesion.  After this overlying column of skin was removed, blunt dissection was used to free the lesion from the surrounding tissues and the lesion was extirpated through the surgical opening made by the punch biopsy. Detail Level: Detailed 4 Mm Punch Excision Text: A 4 mm punch biopsy was used to make an initial incision over the lesion.  After this overlying column of skin was removed, blunt dissection was used to free the lesion from the surrounding tissues and the lesion was extirpated through the surgical opening made by the punch biopsy. Estimated Blood Loss (Cc): minimal Repair Type: Intermediate Hemostasis: Pressure 31-Jan-2019

## 2019-01-31 NOTE — H&P PST ADULT - PROBLEM SELECTOR PLAN 2
PMH diabetes, OR booking notified.   Patient instructed to hold metformin the night before and the day of surgery, pt verbalized understanding.   Pt instructed to follow up with PCP at medical clearance visit for Novolin 70/30 instructions.

## 2019-02-01 PROBLEM — E11.9 TYPE 2 DIABETES MELLITUS WITHOUT COMPLICATIONS: Chronic | Status: ACTIVE | Noted: 2017-10-03

## 2019-02-01 PROBLEM — Z95.5 PRESENCE OF CORONARY ANGIOPLASTY IMPLANT AND GRAFT: Chronic | Status: ACTIVE | Noted: 2017-10-03

## 2019-02-07 PROBLEM — C85.90 NON-HODGKIN LYMPHOMA, UNSPECIFIED, UNSPECIFIED SITE: Chronic | Status: ACTIVE | Noted: 2019-01-31

## 2019-02-07 PROBLEM — D12.6 BENIGN NEOPLASM OF COLON, UNSPECIFIED: Chronic | Status: ACTIVE | Noted: 2019-01-31

## 2019-02-13 ENCOUNTER — INPATIENT (INPATIENT)
Facility: HOSPITAL | Age: 84
LOS: 6 days | Discharge: NURSING, REHAB OR LONG-TERM CARE | End: 2019-02-20
Attending: SURGERY | Admitting: SURGERY
Payer: MEDICARE

## 2019-02-13 VITALS
OXYGEN SATURATION: 100 % | TEMPERATURE: 98 F | DIASTOLIC BLOOD PRESSURE: 61 MMHG | RESPIRATION RATE: 16 BRPM | SYSTOLIC BLOOD PRESSURE: 121 MMHG | HEART RATE: 92 BPM

## 2019-02-13 DIAGNOSIS — I25.10 ATHEROSCLEROTIC HEART DISEASE OF NATIVE CORONARY ARTERY WITHOUT ANGINA PECTORIS: Chronic | ICD-10-CM

## 2019-02-13 DIAGNOSIS — I10 ESSENTIAL (PRIMARY) HYPERTENSION: ICD-10-CM

## 2019-02-13 DIAGNOSIS — E11.649 TYPE 2 DIABETES MELLITUS WITH HYPOGLYCEMIA WITHOUT COMA: ICD-10-CM

## 2019-02-13 DIAGNOSIS — E78.5 HYPERLIPIDEMIA, UNSPECIFIED: ICD-10-CM

## 2019-02-13 DIAGNOSIS — E16.2 HYPOGLYCEMIA, UNSPECIFIED: ICD-10-CM

## 2019-02-13 DIAGNOSIS — Z98.890 OTHER SPECIFIED POSTPROCEDURAL STATES: Chronic | ICD-10-CM

## 2019-02-13 LAB
ALBUMIN SERPL ELPH-MCNC: 2.7 G/DL — LOW (ref 3.3–5)
ALP SERPL-CCNC: 68 U/L — SIGNIFICANT CHANGE UP (ref 40–120)
ALT FLD-CCNC: 6 U/L — SIGNIFICANT CHANGE UP (ref 4–41)
ANION GAP SERPL CALC-SCNC: 12 MMO/L — SIGNIFICANT CHANGE UP (ref 7–14)
AST SERPL-CCNC: 13 U/L — SIGNIFICANT CHANGE UP (ref 4–40)
BASOPHILS # BLD AUTO: 0.01 K/UL — SIGNIFICANT CHANGE UP (ref 0–0.2)
BASOPHILS NFR BLD AUTO: 0.2 % — SIGNIFICANT CHANGE UP (ref 0–2)
BILIRUB SERPL-MCNC: 0.3 MG/DL — SIGNIFICANT CHANGE UP (ref 0.2–1.2)
BUN SERPL-MCNC: 7 MG/DL — SIGNIFICANT CHANGE UP (ref 7–23)
CALCIUM SERPL-MCNC: 8.5 MG/DL — SIGNIFICANT CHANGE UP (ref 8.4–10.5)
CHLORIDE SERPL-SCNC: 93 MMOL/L — LOW (ref 98–107)
CO2 SERPL-SCNC: 23 MMOL/L — SIGNIFICANT CHANGE UP (ref 22–31)
CREAT SERPL-MCNC: 0.55 MG/DL — SIGNIFICANT CHANGE UP (ref 0.5–1.3)
EOSINOPHIL # BLD AUTO: 0 K/UL — SIGNIFICANT CHANGE UP (ref 0–0.5)
EOSINOPHIL NFR BLD AUTO: 0 % — SIGNIFICANT CHANGE UP (ref 0–6)
GLUCOSE BLDC GLUCOMTR-MCNC: 133 MG/DL — HIGH (ref 70–99)
GLUCOSE BLDC GLUCOMTR-MCNC: 73 MG/DL — SIGNIFICANT CHANGE UP (ref 70–99)
GLUCOSE BLDC GLUCOMTR-MCNC: 81 MG/DL — SIGNIFICANT CHANGE UP (ref 70–99)
GLUCOSE SERPL-MCNC: 74 MG/DL — SIGNIFICANT CHANGE UP (ref 70–99)
HCT VFR BLD CALC: 28.3 % — LOW (ref 39–50)
HGB BLD-MCNC: 8.8 G/DL — LOW (ref 13–17)
IMM GRANULOCYTES NFR BLD AUTO: 0.5 % — SIGNIFICANT CHANGE UP (ref 0–1.5)
LYMPHOCYTES # BLD AUTO: 2.05 K/UL — SIGNIFICANT CHANGE UP (ref 1–3.3)
LYMPHOCYTES # BLD AUTO: 34.1 % — SIGNIFICANT CHANGE UP (ref 13–44)
MCHC RBC-ENTMCNC: 25.3 PG — LOW (ref 27–34)
MCHC RBC-ENTMCNC: 31.1 % — LOW (ref 32–36)
MCV RBC AUTO: 81.3 FL — SIGNIFICANT CHANGE UP (ref 80–100)
MONOCYTES # BLD AUTO: 0.51 K/UL — SIGNIFICANT CHANGE UP (ref 0–0.9)
MONOCYTES NFR BLD AUTO: 8.5 % — SIGNIFICANT CHANGE UP (ref 2–14)
NEUTROPHILS # BLD AUTO: 3.41 K/UL — SIGNIFICANT CHANGE UP (ref 1.8–7.4)
NEUTROPHILS NFR BLD AUTO: 56.7 % — SIGNIFICANT CHANGE UP (ref 43–77)
NRBC # FLD: 0 K/UL — LOW (ref 25–125)
PLATELET # BLD AUTO: 339 K/UL — SIGNIFICANT CHANGE UP (ref 150–400)
PMV BLD: 8.4 FL — SIGNIFICANT CHANGE UP (ref 7–13)
POTASSIUM SERPL-MCNC: 4.7 MMOL/L — SIGNIFICANT CHANGE UP (ref 3.5–5.3)
POTASSIUM SERPL-SCNC: 4.7 MMOL/L — SIGNIFICANT CHANGE UP (ref 3.5–5.3)
PROT SERPL-MCNC: 5.8 G/DL — LOW (ref 6–8.3)
RBC # BLD: 3.48 M/UL — LOW (ref 4.2–5.8)
RBC # FLD: 18.3 % — HIGH (ref 10.3–14.5)
SODIUM SERPL-SCNC: 128 MMOL/L — LOW (ref 135–145)
WBC # BLD: 6.01 K/UL — SIGNIFICANT CHANGE UP (ref 3.8–10.5)
WBC # FLD AUTO: 6.01 K/UL — SIGNIFICANT CHANGE UP (ref 3.8–10.5)

## 2019-02-13 PROCEDURE — 99223 1ST HOSP IP/OBS HIGH 75: CPT | Mod: GC

## 2019-02-13 PROCEDURE — 99223 1ST HOSP IP/OBS HIGH 75: CPT

## 2019-02-13 RX ORDER — DEXTROSE 50 % IN WATER 50 %
25 SYRINGE (ML) INTRAVENOUS ONCE
Qty: 0 | Refills: 0 | Status: DISCONTINUED | OUTPATIENT
Start: 2019-02-13 | End: 2019-02-15

## 2019-02-13 RX ORDER — FUROSEMIDE 40 MG
40 TABLET ORAL ONCE
Qty: 0 | Refills: 0 | Status: COMPLETED | OUTPATIENT
Start: 2019-02-13 | End: 2019-02-13

## 2019-02-13 RX ORDER — ENOXAPARIN SODIUM 100 MG/ML
40 INJECTION SUBCUTANEOUS DAILY
Qty: 0 | Refills: 0 | Status: DISCONTINUED | OUTPATIENT
Start: 2019-02-13 | End: 2019-02-15

## 2019-02-13 RX ORDER — DEXTROSE 50 % IN WATER 50 %
15 SYRINGE (ML) INTRAVENOUS ONCE
Qty: 0 | Refills: 0 | Status: DISCONTINUED | OUTPATIENT
Start: 2019-02-13 | End: 2019-02-15

## 2019-02-13 RX ORDER — GLUCAGON INJECTION, SOLUTION 0.5 MG/.1ML
1 INJECTION, SOLUTION SUBCUTANEOUS ONCE
Qty: 0 | Refills: 0 | Status: DISCONTINUED | OUTPATIENT
Start: 2019-02-13 | End: 2019-02-15

## 2019-02-13 RX ORDER — DEXTROSE 50 % IN WATER 50 %
12.5 SYRINGE (ML) INTRAVENOUS ONCE
Qty: 0 | Refills: 0 | Status: DISCONTINUED | OUTPATIENT
Start: 2019-02-13 | End: 2019-02-15

## 2019-02-13 RX ORDER — SODIUM CHLORIDE 9 MG/ML
1000 INJECTION, SOLUTION INTRAVENOUS
Qty: 0 | Refills: 0 | Status: DISCONTINUED | OUTPATIENT
Start: 2019-02-13 | End: 2019-02-13

## 2019-02-13 RX ORDER — INSULIN LISPRO 100/ML
VIAL (ML) SUBCUTANEOUS
Qty: 0 | Refills: 0 | Status: DISCONTINUED | OUTPATIENT
Start: 2019-02-13 | End: 2019-02-13

## 2019-02-13 RX ORDER — ATENOLOL 25 MG/1
50 TABLET ORAL DAILY
Qty: 0 | Refills: 0 | Status: DISCONTINUED | OUTPATIENT
Start: 2019-02-13 | End: 2019-02-15

## 2019-02-13 RX ORDER — TAMSULOSIN HYDROCHLORIDE 0.4 MG/1
0.4 CAPSULE ORAL AT BEDTIME
Qty: 0 | Refills: 0 | Status: DISCONTINUED | OUTPATIENT
Start: 2019-02-13 | End: 2019-02-15

## 2019-02-13 RX ORDER — INSULIN LISPRO 100/ML
VIAL (ML) SUBCUTANEOUS AT BEDTIME
Qty: 0 | Refills: 0 | Status: DISCONTINUED | OUTPATIENT
Start: 2019-02-13 | End: 2019-02-13

## 2019-02-13 RX ORDER — SODIUM CHLORIDE 9 MG/ML
1000 INJECTION, SOLUTION INTRAVENOUS
Qty: 0 | Refills: 0 | Status: DISCONTINUED | OUTPATIENT
Start: 2019-02-13 | End: 2019-02-15

## 2019-02-13 RX ORDER — SODIUM CHLORIDE 9 MG/ML
1000 INJECTION, SOLUTION INTRAVENOUS
Qty: 0 | Refills: 0 | Status: DISCONTINUED | OUTPATIENT
Start: 2019-02-13 | End: 2019-02-14

## 2019-02-13 RX ADMIN — TAMSULOSIN HYDROCHLORIDE 0.4 MILLIGRAM(S): 0.4 CAPSULE ORAL at 21:00

## 2019-02-13 RX ADMIN — ATENOLOL 50 MILLIGRAM(S): 25 TABLET ORAL at 13:31

## 2019-02-13 RX ADMIN — SODIUM CHLORIDE 125 MILLILITER(S): 9 INJECTION, SOLUTION INTRAVENOUS at 06:15

## 2019-02-13 RX ADMIN — Medication 40 MILLIGRAM(S): at 13:31

## 2019-02-13 RX ADMIN — ENOXAPARIN SODIUM 40 MILLIGRAM(S): 100 INJECTION SUBCUTANEOUS at 13:31

## 2019-02-13 NOTE — ED ADULT NURSE NOTE - OBJECTIVE STATEMENT
pt received to room 4, s/p hypoglycemic episode at home. pt took 10 units insulins after 2 days only liquid diet for colonoscopy bowel prep. FS at home 158 prior to taking insulin. pt found by daughter lethargic but responsive. oral glucose given by EMS pt received to room 4, s/p hypoglycemic episode at home. pt took 10 units insulins after 2 days only liquid diet for colonoscopy bowel prep. FS at home 158 prior to taking insulin. pt found by daughter lethargic but responsive. oral glucose given by EMS. FS in triage 79, pt currently awake, alert and oriented. 20G IV placed right arm, abs sent, D51/2NS infusing well per MD order. family at bedside.

## 2019-02-13 NOTE — ED PROVIDER NOTE - PSH
CAD (coronary artery disease)  1 cardiac stent 2006 was on plavix, currently pt is not on aspirin  History of prostate surgery  2007 seed implants

## 2019-02-13 NOTE — CONSULT NOTE ADULT - ATTENDING COMMENTS
Hypoglycemia and overly treated DM at home.  Here on IV dextrose and will taper down as glucose increased. Hold insulin for now.  Check c-peptide to help clarify dc plan. Will follow.

## 2019-02-13 NOTE — CONSULT NOTE ADULT - ASSESSMENT
82 y/o male w / pmhx BPH, prostate ca, CAD, MI s/p stents, DM, GERD, HLD, HTN, and recently diagnosed colon mass who was scheduled for resection today but had an episode of hypoglycemia to 29 this morning, likely secondary to excessive insulin administration.and sx cancelled     would only cover w/ fsg riss  no long acting insulin for now   d5 ns if fsg stable throught day can d/c d5    - Continue clear liquid diet    cad  cont meds  can give lasix     mild hyponatremia   f/u bmp  fluid restrict     dvt proph

## 2019-02-13 NOTE — CONSULT NOTE ADULT - SUBJECTIVE AND OBJECTIVE BOX
HPI:  83M pmhx BPH, prostate ca, CAD, MI s/p stents, DM2 (A1C 6.2), GERD, HLD, HTN, who was BIBEMS for symptomatic hypoglycemia. The patient was scheduled to undergo resection of a colon mass today with Dr. Leon. He had taken his bowel prep yesterday, and last night took 10 units of Novalog 70/30. Around 3-4am this morning the patient was noted to be somewhat obtunded by his daughter, who checked his finger stick, which was 29. EMS was called, the pt received glucagon and was brought to the ED, where he received additional D5 containing solution. His finger stick in the ED was 79. (13 Feb 2019 08:08)    Endocrine History:  Patient reports a hx of Type 2 DM for past 35 years. Follows with endocrine Dr. Saravia. Currently taking novolog 70/30 10-20 units BID. Also on metformin 1000mg BID. Reports when was initially dx with DM was on oral medications, but has been on insulin for >20 years. Checks FS 2X/day. In AM 60s-90s. Pre-bedtime 150s. Reports when FS in 60s-70s, does not take his AM 70/30. Reports with recently has been having decreased appetite. For past 2 days has been on clear liquid diet with bowel prep for surgery for colonic mass. The evening prior to admission took his 10 units of 70/30, found to be obtunded by family in evening with FS 29. Denies retinopathy, or neuropathy.      PAST MEDICAL & SURGICAL HISTORY:  Lymphoma  Benign neoplasm of colon  Prostate cancer  Stented coronary artery: 2006  MI (myocardial infarction)  CAD (coronary artery disease)  HLD (hyperlipidemia)  GERD (gastroesophageal reflux disease)  Benign prostatic hypertrophy  Diabetes: Type II  HTN (hypertension)  CAD (coronary artery disease): 1 cardiac stent 2006 was on plavix, currently pt is not on aspirin  History of prostate surgery: 2007 seed implants      FAMILY HISTORY:  Pt does not know family hx      Social History: No smoking, alcohol use    Outpatient Medications:  · 	atenolol 50 mg oral tablet: 1 tab(s) orally once a day  · 	tamsulosin 0.4 mg oral capsule: 2 cap(s) orally once a day  · 	raNITIdine 75 mg oral tablet: 1 tab(s) orally 2 times a day  · 	metFORMIN 1000 mg oral tablet: 1 tab(s) orally 2 times a day  · 	oxyCODONE 5 mg oral tablet: 1 tab(s) orally every 6 hours, As Needed  · 	furosemide 40 mg oral tablet: 1 tab(s) orally once a day  · 	NovoLOG Mix 70/30: 20 unit(s) subcutaneous 2 times a day      MEDICATIONS  (STANDING):  ATENolol  Tablet 50 milliGRAM(s) Oral daily  dextrose 5% + sodium chloride 0.9%. 1000 milliLiter(s) (75 mL/Hr) IV Continuous <Continuous>  dextrose 5%. 1000 milliLiter(s) (50 mL/Hr) IV Continuous <Continuous>  dextrose 50% Injectable 12.5 Gram(s) IV Push once  dextrose 50% Injectable 25 Gram(s) IV Push once  dextrose 50% Injectable 25 Gram(s) IV Push once  enoxaparin Injectable 40 milliGRAM(s) SubCutaneous daily  insulin lispro (HumaLOG) corrective regimen sliding scale   SubCutaneous three times a day before meals  insulin lispro (HumaLOG) corrective regimen sliding scale   SubCutaneous at bedtime  ranitidine  Oral Tab/Cap - Peds 75 milliGRAM(s) Oral two times a day  tamsulosin 0.4 milliGRAM(s) Oral at bedtime    MEDICATIONS  (PRN):  dextrose 40% Gel 15 Gram(s) Oral once PRN Blood Glucose LESS THAN 70 milliGRAM(s)/deciliter  glucagon  Injectable 1 milliGRAM(s) IntraMuscular once PRN Glucose LESS THAN 70 milligrams/deciliter      Allergies    No Known Allergies    Intolerances      Review of Systems:  Constitutional: No fever  Eyes: No blurry vision  Neuro: No tremors  HEENT: No pain  Cardiovascular: No chest pain, palpitations  Respiratory: No SOB, no cough  GI: No nausea, vomiting, abdominal pain  : No dysuria  Skin: no rash  Endocrine: no polyuria, polydipsia  Hem/lymph: no swelling  Osteoporosis: no fractures    ALL OTHER SYSTEMS REVIEWED AND NEGATIVE    PHYSICAL EXAM:  VITALS: T(C): 36.3 (02-13-19 @ 09:19)  T(F): 97.4 (02-13-19 @ 09:19), Max: 98 (02-13-19 @ 05:44)  HR: 71 (02-13-19 @ 13:27) (58 - 92)  BP: 133/74 (02-13-19 @ 13:27) (121/61 - 137/72)  RR:  (15 - 20)  SpO2:  (100% - 100%)  Wt(kg): --  GENERAL: NAD, well-groomed, well-developed, thin  EYES: No proptosis, no lid lag, anicteric  HEENT:  Atraumatic, Normocephalic, moist mucous membranes  THYROID: Normal size, no palpable nodules  RESPIRATORY: Clear to auscultation bilaterally; No rales, rhonchi, wheezing  CARDIOVASCULAR: Regular rate and rhythm; No murmurs; no peripheral edema  GI: Soft, nontender, non distended, normal bowel sounds  SKIN: Dry, intact, No rashes or lesions  MUSCULOSKELETAL: Full range of motion, normal strength  NEURO: sensation intact, extraocular movements intact, no tremor  PSYCH: Alert and oriented x 3, normal affect, normal mood  CUSHING'S SIGNS: no striae    POCT Blood Glucose.: 81 mg/dL (02-13-19 @ 12:07)  POCT Blood Glucose.: 125 mg/dL (02-13-19 @ 07:59)  POCT Blood Glucose.: 79 mg/dL (02-13-19 @ 05:49)                            8.8    6.01  )-----------( 339      ( 13 Feb 2019 06:10 )             28.3       02-13    128<L>  |  93<L>  |  7   ----------------------------<  74  4.7   |  23  |  0.55    EGFR if : 112  EGFR if non : 97    Ca    8.5      02-13    TPro  5.8<L>  /  Alb  2.7<L>  /  TBili  0.3  /  DBili  x   /  AST  13  /  ALT  6   /  AlkPhos  68  02-13      Hemoglobin A1C, Whole Blood: 6.2 % <H> [4.0 - 5.6] (01-31-19 @ 11:05)

## 2019-02-13 NOTE — ED PROVIDER NOTE - OBJECTIVE STATEMENT
82 yo M with BPH prostate CA s/p seeding, CAD, DM, GERD, HLD, HTN, MI, Stents that was BIBEMS for hypoglycemia, fingerstick by EMS 29. Pt is scheduled for colon surgery this morning so last two days has been taking preop colon cleansing prep and eating clear liquids but still taking his insulin. Last ate some chicken broth at 7 hours prior to arrival, took 10 units of his novalog and went to bed. This morning family tried to wake pt, was minimally responsive, FS low, so given glucagon by EMS and now back to baseline. Pt denies use of sulfonyureas. Pt also denies chest pain, SOB, URI symptoms, UTI symptoms.

## 2019-02-13 NOTE — ED ADULT TRIAGE NOTE - CHIEF COMPLAINT QUOTE
Pt. is due for surgery today.  Pt. was schedule for colon surgery this morning for colon CA.  Pt. was found to have FS of 29 at home and 40 by ems.  1 tube glucose paste and 1mg of glucagon. FS in triage 79.

## 2019-02-13 NOTE — ED PROVIDER NOTE - CLINICAL SUMMARY MEDICAL DECISION MAKING FREE TEXT BOX
84 yo M with multiple medical problems that was BIBEMS for hypoglycemia this AM, FS per EMS 29. given glucagon, now baseline mentation and FS in triage 79. Scheduled for surgery this morning for colon mass. Will obtain labs, provide dextrose, and consult surgery.

## 2019-02-13 NOTE — H&P ADULT - ASSESSMENT
ASSESSMENT:  83M pmhx BPH, prostate ca, CAD, MI s/p stents, DM, GERD, HLD, HTN, and recently diagnosed colon mass who was scheduled for resection today but had an episode of hypoglycemia to 29 this morning, likely secondary to excessive insulin administration.    PLAN:  - Admit to D team surgery under Dr. Leon  - OR case cancelled for today. Pt seen in ED by anesthesia, would prefer to wait at least 1 more day  - Tentatively rescheduling for this Friday, 2/15  - Continue clear liquid diet  - Physical therapy consult  - Endocrine consult regarding hypoglycemia  - Resume home medications. Holding lasix for now    Seen and examined with Dr. Carolyn Hopper, PGY-2  D Team Surgery [id number]

## 2019-02-13 NOTE — ED PROVIDER NOTE - PMH
Benign neoplasm of colon    Benign prostatic hypertrophy    CAD (coronary artery disease)    Diabetes  Type II  GERD (gastroesophageal reflux disease)    HLD (hyperlipidemia)    HTN (hypertension)    Lymphoma    MI (myocardial infarction)    Prostate cancer    Stented coronary artery  2006

## 2019-02-13 NOTE — CONSULT NOTE ADULT - ASSESSMENT
83M pmhx BPH, prostate ca, CAD, MI s/p stents, DM2 (A1C 6.2), GERD, HLD, HTN, who was BIBEMS for symptomatic hypoglycemia. Currently on D5NS and clear liquid diet and plan for colonic mass resection on 2/15.

## 2019-02-13 NOTE — H&P ADULT - ATTENDING COMMENTS
Admit for hyoglycemia.  Will reschedule surgery for later this week  cont clears    d/w pt and family

## 2019-02-13 NOTE — H&P ADULT - HISTORY OF PRESENT ILLNESS
83M pmhx BPH, prostate ca, CAD, MI s/p stents, DM, GERD, HLD, HTN, who was BIBEMS for symptomatic hypoglycemia. The patient was scheduled to undergo resection of a colon mass today with Dr. Leon. He had taken his bowel prep yesterday, and last night took 10 units of Novalog 70/30. Around 3-4am this morning the patient was noted to be somewhat obtunded by his daughter, who checked his finger stick, which was 29. EMS was called, the pt received glucagon and was brought to the ED, where he received additional D5 containing solution. His finger stick in the ED was 79.

## 2019-02-13 NOTE — CONSULT NOTE ADULT - PROBLEM SELECTOR RECOMMENDATION 9
-Patient with Type 2 DM -A1C 6.2. (6.8% in oct 2018 as well) Given patients age and comorbidities concern about too stringent control of DM and risks of hypoglycemia.  -Agree for now to c/w D5NS at 75cc/hr. If 2 FS>150, can decrease rate to 50cc/hr, and if continue to be >150s, then can dc the IVF.  -Would discontinue the humalog correctional scale for now and monitor FS while still on IVF.  -Will check GIFTY and islet cell ab, as well as c-peptide to evaluate for pancreatic function.  -For discharge, may be possible to dc just on oral medications- metformin /tradjenta based on BW.  -Goal A1C 7.5-8% -Patient with Type 2 DM -A1C 6.2. (6.8% in oct 2018 as well) Given patients age and comorbidities concern about too stringent control of DM and risks of hypoglycemia.  -Agree for now to c/w D5NS at 75cc/hr. If 2 FS>150, can decrease rate to 50cc/hr, and if continue to be >150s, then can dc the IVF.  -Would discontinue the humalog correctional scale for now and monitor FS while still on IVF.  -Will check GIFTY as well as c-peptide to evaluate for pancreatic function.  -For discharge, may be possible to dc just on oral medications- metformin /tradjenta based on BW.  -Goal A1C 7.5-8%

## 2019-02-13 NOTE — H&P ADULT - NSHPLABSRESULTS_GEN_ALL_CORE
CBC (02-13 @ 06:10)                              8.8<L>                         6.01    )----------------(  339        56.7  % Neutrophils, 34.1  % Lymphocytes, ANC: 3.41                                28.3<L>    BMP (02-13 @ 06:10)             128<L>  |  93<L>   |  7     		Ca++ --      Ca 8.5                ---------------------------------( 74    		Mg --                 4.7     |  23      |  0.55  			Ph --        LFTs (02-13 @ 06:10)      TPro 5.8<L> / Alb 2.7<L> / TBili 0.3 / DBili -- / AST 13 / ALT 6 / AlkPhos 68

## 2019-02-13 NOTE — PATIENT PROFILE ADULT - NSPROHMONCTXGIVEN_GEN_A_NUR
Plan for surgical intervention, then further treatment. No current intervention occurring per patient

## 2019-02-13 NOTE — CONSULT NOTE ADULT - SUBJECTIVE AND OBJECTIVE BOX
Patient is a 83y old  Male who presents with a chief complaint of hypoglycemia  pt w/ cancelledsx w/ dec glucose      INTERVAL HPI/OVERNIGHT EVENTS:    Medications:MEDICATIONS  (STANDING):  ATENolol  Tablet 50 milliGRAM(s) Oral daily  dextrose 5% + sodium chloride 0.9%. 1000 milliLiter(s) (75 mL/Hr) IV Continuous <Continuous>  dextrose 5%. 1000 milliLiter(s) (50 mL/Hr) IV Continuous <Continuous>  dextrose 50% Injectable 12.5 Gram(s) IV Push once  dextrose 50% Injectable 25 Gram(s) IV Push once  dextrose 50% Injectable 25 Gram(s) IV Push once  enoxaparin Injectable 40 milliGRAM(s) SubCutaneous daily  insulin lispro (HumaLOG) corrective regimen sliding scale   SubCutaneous three times a day before meals  insulin lispro (HumaLOG) corrective regimen sliding scale   SubCutaneous at bedtime  ranitidine  Oral Tab/Cap - Peds 75 milliGRAM(s) Oral two times a day  tamsulosin 0.4 milliGRAM(s) Oral at bedtime    MEDICATIONS  (PRN):  dextrose 40% Gel 15 Gram(s) Oral once PRN Blood Glucose LESS THAN 70 milliGRAM(s)/deciliter  glucagon  Injectable 1 milliGRAM(s) IntraMuscular once PRN Glucose LESS THAN 70 milligrams/deciliter      Allergies: Allergies    No Known Allergies    Intolerances          FAMILY HISTORY:  No pertinent family history in first degree relatives        PAST MEDICAL & SURGICAL HISTORY:  Lymphoma  Benign neoplasm of colon  Prostate cancer  Stented coronary artery: 2006  MI (myocardial infarction)  CAD (coronary artery disease)  HLD (hyperlipidemia)  GERD (gastroesophageal reflux disease)  Benign prostatic hypertrophy  Diabetes: Type II  HTN (hypertension)  CAD (coronary artery disease): 1 cardiac stent 2006 was on plavix, currently pt is not on aspirin  History of prostate surgery: 2007 seed implants      REVIEW OF SYSTEMS:  CONSTITUTIONAL: No fever, weight loss, or fatigue  EYES: No eye pain, visual disturbances, or discharge  ENMT:  No difficulty hearing, tinnitus, vertigo; No sinus or throat pain  NECK: No pain or stiffness  RESPIRATORY: No cough, wheezing, chills or hemoptysis; No shortness of breath  CARDIOVASCULAR: No chest pain, palpitations, dizziness, or leg swelling  GASTROINTESTINAL: No abdominal or epigastric pain. No nausea, vomiting, or hematemesis; No diarrhea or constipation. No melena or hematochezia.  GENITOURINARY: No dysuria, frequency, hematuria, or incontinence  NEUROLOGICAL: No headaches, memory loss, loss of strength, numbness, or tremors  SKIN: No itching, burning, rashes, or lesions   LYMPH NODES: No enlarged glands  ENDOCRINE: No heat or cold intolerance; No hair loss  MUSCULOSKELETAL: No joint pain or swelling; No muscle, back, or extremity pain  PSYCHIATRIC: No depression, anxiety, mood swings, or difficulty sleeping  HEME/LYMPH: No easy bruising, or bleeding gums  ALLERY AND IMMUNOLOGIC: No hives or eczema    T(C): 36.3 (02-13-19 @ 09:19), Max: 36.7 (02-13-19 @ 05:44)  HR: 58 (02-13-19 @ 09:19) (58 - 92)  BP: 133/71 (02-13-19 @ 09:19) (121/61 - 137/72)  RR: 20 (02-13-19 @ 09:19) (15 - 20)  SpO2: 100% (02-13-19 @ 09:19) (100% - 100%)  Wt(kg): --Vital Signs Last 24 Hrs  T(C): 36.3 (13 Feb 2019 09:19), Max: 36.7 (13 Feb 2019 05:44)  T(F): 97.4 (13 Feb 2019 09:19), Max: 98 (13 Feb 2019 05:44)  HR: 58 (13 Feb 2019 09:19) (58 - 92)  BP: 133/71 (13 Feb 2019 09:19) (121/61 - 137/72)  BP(mean): --  RR: 20 (13 Feb 2019 09:19) (15 - 20)  SpO2: 100% (13 Feb 2019 09:19) (100% - 100%)  I&O's Summary    13 Feb 2019 07:01  -  13 Feb 2019 11:44  --------------------------------------------------------  IN: 470 mL / OUT: 0 mL / NET: 470 mL        PHYSICAL EXAM:  GENERAL: NAD,   HEAD:  Atraumatic, Normocephalic  EYES: EOMI, PERRLA, conjunctiva and sclera clear  ENMT: No tonsillar erythema, exudates, or enlargement; Moist mucous membranes,    No lesions  NECK: Supple, No JVD, Normal thyroid  NERVOUS SYSTEM:  Alert & Oriented    Motor Strength 5/5 B/L upper and lower extremities; DTRs 2+ intact and symmetric  CHEST/LUNG: Clear to percussion bilaterally; No rales, rhonchi, wheezing, or rubs  HEART: Regular rate and rhythm; No murmurs, rubs, or gallops  ABDOMEN: Soft, Nontender, Nondistended; Bowel sounds present  EXTREMITIES:  +  Peripheral Pulses, No clubbing, cyanosis, or edema  LYMPH: No lymphadenopathy noted  SKIN: No rashes or lesions    Consultant(s) Notes Reviewed:  [x ] YES  [ ] NO  Care Discussed with Consultants/Other Providerscpk [ x] YES  [ ] NO    LABS:                    CBC Full  -  ( 13 Feb 2019 06:10 )  WBC Count : 6.01 K/uL  Hemoglobin : 8.8 g/dL  Hematocrit : 28.3 %  Platelet Count - Automated : 339 K/uL  Mean Cell Volume : 81.3 fL  Mean Cell Hemoglobin : 25.3 pg  Mean Cell Hemoglobin Concentration : 31.1 %  Auto Neutrophil # : 3.41 K/uL  Auto Lymphocyte # : 2.05 K/uL  Auto Monocyte # : 0.51 K/uL  Auto Eosinophil # : 0.00 K/uL  Auto Basophil # : 0.01 K/uL  Auto Neutrophil % : 56.7 %  Auto Lymphocyte % : 34.1 %  Auto Monocyte % : 8.5 %  Auto Eosinophil % : 0.0 %  Auto Basophil % : 0.2 %      02-13    128<L>  |  93<L>  |  7   ----------------------------<  74  4.7   |  23  |  0.55    Ca    8.5      13 Feb 2019 06:10    TPro  5.8<L>  /  Alb  2.7<L>  /  TBili  0.3  /  DBili  x   /  AST  13  /  ALT  6   /  AlkPhos  68  02-13            RADIOLOGY & ADDITIONAL TESTS:    Imaging Personally Reviewed:  [ ] YES  [ ] NO

## 2019-02-14 ENCOUNTER — TRANSCRIPTION ENCOUNTER (OUTPATIENT)
Age: 84
End: 2019-02-14

## 2019-02-14 LAB
ALBUMIN SERPL ELPH-MCNC: 2.5 G/DL — LOW (ref 3.3–5)
ALP SERPL-CCNC: 62 U/L — SIGNIFICANT CHANGE UP (ref 40–120)
ALT FLD-CCNC: 6 U/L — SIGNIFICANT CHANGE UP (ref 4–41)
ANION GAP SERPL CALC-SCNC: 13 MMO/L — SIGNIFICANT CHANGE UP (ref 7–14)
AST SERPL-CCNC: 7 U/L — SIGNIFICANT CHANGE UP (ref 4–40)
BILIRUB SERPL-MCNC: 0.3 MG/DL — SIGNIFICANT CHANGE UP (ref 0.2–1.2)
BUN SERPL-MCNC: 4 MG/DL — LOW (ref 7–23)
C PEPTIDE SERPL-MCNC: 0.2 NG/ML — LOW (ref 0.9–7.1)
CALCIUM SERPL-MCNC: 8.3 MG/DL — LOW (ref 8.4–10.5)
CHLORIDE SERPL-SCNC: 99 MMOL/L — SIGNIFICANT CHANGE UP (ref 98–107)
CO2 SERPL-SCNC: 25 MMOL/L — SIGNIFICANT CHANGE UP (ref 22–31)
CREAT SERPL-MCNC: 0.58 MG/DL — SIGNIFICANT CHANGE UP (ref 0.5–1.3)
GLUCOSE BLDC GLUCOMTR-MCNC: 147 MG/DL — HIGH (ref 70–99)
GLUCOSE BLDC GLUCOMTR-MCNC: 169 MG/DL — HIGH (ref 70–99)
GLUCOSE BLDC GLUCOMTR-MCNC: 208 MG/DL — HIGH (ref 70–99)
GLUCOSE BLDC GLUCOMTR-MCNC: 213 MG/DL — HIGH (ref 70–99)
GLUCOSE SERPL-MCNC: 147 MG/DL — HIGH (ref 70–99)
HCT VFR BLD CALC: 29.7 % — LOW (ref 39–50)
HGB BLD-MCNC: 8.9 G/DL — LOW (ref 13–17)
MAGNESIUM SERPL-MCNC: 2.1 MG/DL — SIGNIFICANT CHANGE UP (ref 1.6–2.6)
MCHC RBC-ENTMCNC: 24.5 PG — LOW (ref 27–34)
MCHC RBC-ENTMCNC: 30 % — LOW (ref 32–36)
MCV RBC AUTO: 81.6 FL — SIGNIFICANT CHANGE UP (ref 80–100)
NRBC # FLD: 0 K/UL — LOW (ref 25–125)
PHOSPHATE SERPL-MCNC: 2.9 MG/DL — SIGNIFICANT CHANGE UP (ref 2.5–4.5)
PLATELET # BLD AUTO: 391 K/UL — SIGNIFICANT CHANGE UP (ref 150–400)
PMV BLD: 8.8 FL — SIGNIFICANT CHANGE UP (ref 7–13)
POTASSIUM SERPL-MCNC: 3.9 MMOL/L — SIGNIFICANT CHANGE UP (ref 3.5–5.3)
POTASSIUM SERPL-SCNC: 3.9 MMOL/L — SIGNIFICANT CHANGE UP (ref 3.5–5.3)
PROT SERPL-MCNC: 5 G/DL — LOW (ref 6–8.3)
RBC # BLD: 3.64 M/UL — LOW (ref 4.2–5.8)
RBC # FLD: 18.1 % — HIGH (ref 10.3–14.5)
SODIUM SERPL-SCNC: 137 MMOL/L — SIGNIFICANT CHANGE UP (ref 135–145)
WBC # BLD: 5.81 K/UL — SIGNIFICANT CHANGE UP (ref 3.8–10.5)
WBC # FLD AUTO: 5.81 K/UL — SIGNIFICANT CHANGE UP (ref 3.8–10.5)

## 2019-02-14 PROCEDURE — 99232 SBSQ HOSP IP/OBS MODERATE 35: CPT | Mod: 57

## 2019-02-14 PROCEDURE — 99232 SBSQ HOSP IP/OBS MODERATE 35: CPT | Mod: GC

## 2019-02-14 RX ORDER — INSULIN LISPRO 100/ML
VIAL (ML) SUBCUTANEOUS
Qty: 0 | Refills: 0 | Status: DISCONTINUED | OUTPATIENT
Start: 2019-02-14 | End: 2019-02-15

## 2019-02-14 RX ORDER — DEXTROSE MONOHYDRATE, SODIUM CHLORIDE, AND POTASSIUM CHLORIDE 50; .745; 4.5 G/1000ML; G/1000ML; G/1000ML
1000 INJECTION, SOLUTION INTRAVENOUS
Qty: 0 | Refills: 0 | Status: DISCONTINUED | OUTPATIENT
Start: 2019-02-14 | End: 2019-02-15

## 2019-02-14 RX ORDER — METRONIDAZOLE 500 MG
500 TABLET ORAL
Qty: 0 | Refills: 0 | Status: DISCONTINUED | OUTPATIENT
Start: 2019-02-14 | End: 2019-02-15

## 2019-02-14 RX ORDER — FAMOTIDINE 10 MG/ML
20 INJECTION INTRAVENOUS
Qty: 0 | Refills: 0 | Status: DISCONTINUED | OUTPATIENT
Start: 2019-02-14 | End: 2019-02-15

## 2019-02-14 RX ORDER — NEOMYCIN SULFATE 500 MG/1
1000 TABLET ORAL
Qty: 0 | Refills: 0 | Status: DISCONTINUED | OUTPATIENT
Start: 2019-02-14 | End: 2019-02-15

## 2019-02-14 RX ADMIN — FAMOTIDINE 20 MILLIGRAM(S): 10 INJECTION INTRAVENOUS at 17:21

## 2019-02-14 RX ADMIN — TAMSULOSIN HYDROCHLORIDE 0.4 MILLIGRAM(S): 0.4 CAPSULE ORAL at 21:47

## 2019-02-14 RX ADMIN — NEOMYCIN SULFATE 1000 MILLIGRAM(S): 500 TABLET ORAL at 14:15

## 2019-02-14 RX ADMIN — NEOMYCIN SULFATE 1000 MILLIGRAM(S): 500 TABLET ORAL at 13:01

## 2019-02-14 RX ADMIN — Medication 500 MILLIGRAM(S): at 14:15

## 2019-02-14 RX ADMIN — Medication 500 MILLIGRAM(S): at 13:00

## 2019-02-14 RX ADMIN — ENOXAPARIN SODIUM 40 MILLIGRAM(S): 100 INJECTION SUBCUTANEOUS at 13:01

## 2019-02-14 RX ADMIN — NEOMYCIN SULFATE 1000 MILLIGRAM(S): 500 TABLET ORAL at 21:47

## 2019-02-14 RX ADMIN — Medication 500 MILLIGRAM(S): at 21:47

## 2019-02-14 NOTE — PROGRESS NOTE ADULT - ASSESSMENT
Colon cancer and hypoglycemia    Appreciate Endocrine and Int Med input    Clears today    NPO p MN.  Plan OR tomorrow

## 2019-02-14 NOTE — PROGRESS NOTE ADULT - SUBJECTIVE AND OBJECTIVE BOX
SURGICAL ONCOLOGY PROGRESS NOTE    No complaints    Vital Signs Last 24 Hrs  T(C): 36.4 (14 Feb 2019 05:28), Max: 36.4 (13 Feb 2019 20:22)  T(F): 97.6 (14 Feb 2019 05:28), Max: 97.6 (13 Feb 2019 20:22)  HR: 60 (14 Feb 2019 05:28) (51 - 71)  BP: 107/55 (14 Feb 2019 05:28) (107/52 - 133/74)  BP(mean): --  RR: 18 (14 Feb 2019 05:28) (18 - 20)  SpO2: 99% (14 Feb 2019 05:28) (99% - 100%)  I&O's Detail    13 Feb 2019 07:01  -  14 Feb 2019 07:00  --------------------------------------------------------  IN:    dextrose 5% + sodium chloride 0.45%.: 125 mL    dextrose 5% + sodium chloride 0.9%: 975 mL    Oral Fluid: 1080 mL  Total IN: 2180 mL    OUT:    Voided: 300 mL  Total OUT: 300 mL    Total NET: 1880 mL      14 Feb 2019 07:01  -  14 Feb 2019 10:42  --------------------------------------------------------  IN:  Total IN: 0 mL    OUT:    Voided: 550 mL  Total OUT: 550 mL    Total NET: -550 mL          PE:    A&A  NAD    soft, NT, ND, No peritoneal signs                            8.9    5.81  )-----------( 391      ( 14 Feb 2019 05:09 )             29.7     02-14    137  |  99  |  4<L>  ----------------------------<  147<H>  3.9   |  25  |  0.58    Ca    8.3<L>      14 Feb 2019 05:09  Phos  2.9     02-14  Mg     2.1     02-14    TPro  5.0<L>  /  Alb  2.5<L>  /  TBili  0.3  /  DBili  x   /  AST  7   /  ALT  6   /  AlkPhos  62  02-14

## 2019-02-14 NOTE — PROGRESS NOTE ADULT - ASSESSMENT
83M pmhx BPH, prostate ca, CAD, MI s/p stents, DM, GERD, HLD, HTN, and recently diagnosed colon mass who was scheduled for resection today but had an episode of hypoglycemia to 29 this morning, likely secondary to excessive insulin administration. OR tomorrow.     - NPO for OR tomorrow w. IVF  - Abx bowel prep  - Preop labs  - C/w home meds except Lasix   - OOB/PT      Surgery [id number]

## 2019-02-14 NOTE — PROGRESS NOTE ADULT - SUBJECTIVE AND OBJECTIVE BOX
Chief Complaint: Type 2 DM with hypoglycemia    History: Patient reports feeling well. Tolerating clear diet well. Scheduled for sx tomorrow for colonic mass resection. Has been off of D5 since this AM. FS in acceptable range.    MEDICATIONS  (STANDING):  ATENolol  Tablet 50 milliGRAM(s) Oral daily  dextrose 5% + sodium chloride 0.45% with potassium chloride 20 mEq/L 1000 milliLiter(s) (75 mL/Hr) IV Continuous <Continuous>  dextrose 5%. 1000 milliLiter(s) (50 mL/Hr) IV Continuous <Continuous>  dextrose 50% Injectable 12.5 Gram(s) IV Push once  dextrose 50% Injectable 25 Gram(s) IV Push once  dextrose 50% Injectable 25 Gram(s) IV Push once  enoxaparin Injectable 40 milliGRAM(s) SubCutaneous daily  famotidine    Tablet 20 milliGRAM(s) Oral two times a day  insulin lispro (HumaLOG) corrective regimen sliding scale   SubCutaneous three times a day before meals  [id number]    Tablet 500 milliGRAM(s) Oral <User Schedule>  neomycin 1000 milliGRAM(s) Oral <User Schedule>  tamsulosin 0.4 milliGRAM(s) Oral at bedtime    MEDICATIONS  (PRN):  dextrose 40% Gel 15 Gram(s) Oral once PRN Blood Glucose LESS THAN 70 milliGRAM(s)/deciliter  glucagon  Injectable 1 milliGRAM(s) IntraMuscular once PRN Glucose LESS THAN 70 milligrams/deciliter      Allergies    No Known Allergies    Intolerances      PHYSICAL EXAM:  VITALS: T(C): 36.2 (02-14-19 @ 11:43)  T(F): 97.1 (02-14-19 @ 11:43), Max: 97.6 (02-13-19 @ 20:22)  HR: 76 (02-14-19 @ 11:43) (51 - 76)  BP: 103/68 (02-14-19 @ 11:43) (103/68 - 112/60)  RR:  (18 - 20)  SpO2:  (99% - 100%)  Wt(kg): --  GENERAL: NAD, well-groomed, thin  EYES: No proptosis, no lid lag, anicteric  HEENT:  Atraumatic, Normocephalic, moist mucous membranes  GI: Soft, nontender, non distended, normal bowel sounds  SKIN: Dry, intact, No rashes or lesions  MUSCULOSKELETAL: Full range of motion, normal strength  PSYCH: Alert and oriented x 3, normal affect, normal mood    POCT Blood Glucose.: 213 mg/dL (02-14-19 @ 12:23)  POCT Blood Glucose.: 169 mg/dL (02-14-19 @ 08:40)  POCT Blood Glucose.: 133 mg/dL (02-13-19 @ 21:06)  POCT Blood Glucose.: 73 mg/dL (02-13-19 @ 17:56)  POCT Blood Glucose.: 81 mg/dL (02-13-19 @ 12:07)  POCT Blood Glucose.: 125 mg/dL (02-13-19 @ 07:59)  POCT Blood Glucose.: 79 mg/dL (02-13-19 @ 05:49)      02-14    137  |  99  |  4<L>  ----------------------------<  147<H>  3.9   |  25  |  0.58    EGFR if : 110  EGFR if non : 95    Ca    8.3<L>      02-14  Mg     2.1     02-14  Phos  2.9     02-14    TPro  5.0<L>  /  Alb  2.5<L>  /  TBili  0.3  /  DBili  x   /  AST  7   /  ALT  6   /  AlkPhos  62  02-14              Hemoglobin A1C, Whole Blood: 6.2 % <H> [4.0 - 5.6] (01-31-19 @ 11:05)

## 2019-02-14 NOTE — PROGRESS NOTE ADULT - PROBLEM SELECTOR PLAN 1
-Patient with Type 2 DM -A1C 6.2. (6.8% in oct 2018 as well) Given patients age and comorbidities concern about too stringent control of DM and risks of hypoglycemia.  -Has been off D5 since AM and FS acceptable. Can c/w low dose humalog correctional scale for now with meals and q6hrs when NPO.  -F/U c-peptide- will help in determining dc plan after sx.- possible orals vs. basal +orals.  -Goal A1C 7.5-8%.

## 2019-02-14 NOTE — PROGRESS NOTE ADULT - SUBJECTIVE AND OBJECTIVE BOX
SURGERY DAILY PROGRESS NOTE:       SUBJECTIVE/ROS: Patient examined at bedside. No acute events overnight. FS wnl. Tolerates CLD w/o N/V. +BM/+Flatus         MEDICATIONS  (STANDING):  ATENolol  Tablet 50 milliGRAM(s) Oral daily  dextrose 5% + sodium chloride 0.45% with potassium chloride 20 mEq/L 1000 milliLiter(s) (75 mL/Hr) IV Continuous <Continuous>  dextrose 5%. 1000 milliLiter(s) (50 mL/Hr) IV Continuous <Continuous>  dextrose 50% Injectable 12.5 Gram(s) IV Push once  dextrose 50% Injectable 25 Gram(s) IV Push once  dextrose 50% Injectable 25 Gram(s) IV Push once  enoxaparin Injectable 40 milliGRAM(s) SubCutaneous daily  famotidine    Tablet 20 milliGRAM(s) Oral two times a day  insulin lispro (HumaLOG) corrective regimen sliding scale   SubCutaneous three times a day before meals  [id number]    Tablet 500 milliGRAM(s) Oral <User Schedule>  neomycin 1000 milliGRAM(s) Oral <User Schedule>  tamsulosin 0.4 milliGRAM(s) Oral at bedtime    MEDICATIONS  (PRN):  dextrose 40% Gel 15 Gram(s) Oral once PRN Blood Glucose LESS THAN 70 milliGRAM(s)/deciliter  glucagon  Injectable 1 milliGRAM(s) IntraMuscular once PRN Glucose LESS THAN 70 milligrams/deciliter      OBJECTIVE:    Vital Signs Last 24 Hrs  T(C): 36.3 (14 Feb 2019 16:45), Max: 36.4 (13 Feb 2019 20:22)  T(F): 97.3 (14 Feb 2019 16:45), Max: 97.6 (13 Feb 2019 20:22)  HR: 75 (14 Feb 2019 16:45) (51 - 76)  BP: 97/55 (14 Feb 2019 16:45) (97/55 - 112/60)  BP(mean): --  RR: 18 (14 Feb 2019 16:45) (18 - 18)  SpO2: 100% (14 Feb 2019 16:45) (99% - 100%)        I&O's Detail    13 Feb 2019 07:01  -  14 Feb 2019 07:00  --------------------------------------------------------  IN:    dextrose 5% + sodium chloride 0.45%.: 125 mL    dextrose 5% + sodium chloride 0.9%: 975 mL    Oral Fluid: 1080 mL  Total IN: 2180 mL    OUT:    Voided: 300 mL  Total OUT: 300 mL    Total NET: 1880 mL      14 Feb 2019 07:01  -  14 Feb 2019 19:02  --------------------------------------------------------  IN:    Oral Fluid: 720 mL  Total IN: 720 mL    OUT:    Voided: 1050 mL  Total OUT: 1050 mL    Total NET: -330 mL          Daily     Daily     LABS:                        8.9    5.81  )-----------( 391      ( 14 Feb 2019 05:09 )             29.7     02-14    137  |  99  |  4<L>  ----------------------------<  147<H>  3.9   |  25  |  0.58    Ca    8.3<L>      14 Feb 2019 05:09  Phos  2.9     02-14  Mg     2.1     02-14    TPro  5.0<L>  /  Alb  2.5<L>  /  TBili  0.3  /  DBili  x   /  AST  7   /  ALT  6   /  AlkPhos  62  02-14                  PHYSICAL EXAM:  GEN: NAD  Pulm: unlabored breathing on RA  CV: radial pulse 2+, cap refil <2sec  Abd: soft, nontender, nondistedned, incision CDI

## 2019-02-14 NOTE — PROGRESS NOTE ADULT - SUBJECTIVE AND OBJECTIVE BOX
CHIEF COMPLAINT:Patient is a 83y old  Male who presents with a chief complaint of hypoglycemia (13 Feb 2019 16:15)    	        PAST MEDICAL & SURGICAL HISTORY:  Lymphoma  Benign neoplasm of colon  Prostate cancer  Stented coronary artery: 2006  MI (myocardial infarction)  CAD (coronary artery disease)  HLD (hyperlipidemia)  GERD (gastroesophageal reflux disease)  Benign prostatic hypertrophy  Diabetes: Type II  HTN (hypertension)  CAD (coronary artery disease): 1 cardiac stent 2006 was on plavix, currently pt is not on aspirin  History of prostate surgery: 2007 seed implants          REVIEW OF SYSTEMS:  CONSTITUTIONAL: No fever, weight loss, or fatigue  EYES: No eye pain, visual disturbances, or discharge  NECK: No pain or stiffness  RESPIRATORY: No cough, wheezing, chills or hemoptysis; No Shortness of Breath  CARDIOVASCULAR: No chest pain, palpitations, passing out, dizziness, or leg swelling  GASTROINTESTINAL: No abdominal or epigastric pain. No nausea, vomiting, or hematemesis; No diarrhea or constipation. No melena or hematochezia.  GENITOURINARY: No dysuria, frequency, hematuria, or incontinence  NEUROLOGICAL: No headaches,     SKIN: No itching, burning, rashes, or lesions   LYMPH Nodes: No enlarged glands  ENDOCRINE: No heat or cold intolerance; No hair loss  MUSCULOSKELETAL: No joint pain or swelling; No muscle, back, or extremity pain    Medications:  MEDICATIONS  (STANDING):  ATENolol  Tablet 50 milliGRAM(s) Oral daily  dextrose 5% + sodium chloride 0.45% with potassium chloride 20 mEq/L 1000 milliLiter(s) (75 mL/Hr) IV Continuous <Continuous>  dextrose 5%. 1000 milliLiter(s) (50 mL/Hr) IV Continuous <Continuous>  dextrose 50% Injectable 12.5 Gram(s) IV Push once  dextrose 50% Injectable 25 Gram(s) IV Push once  dextrose 50% Injectable 25 Gram(s) IV Push once  enoxaparin Injectable 40 milliGRAM(s) SubCutaneous daily  famotidine    Tablet 20 milliGRAM(s) Oral two times a day  tamsulosin 0.4 milliGRAM(s) Oral at bedtime    MEDICATIONS  (PRN):  dextrose 40% Gel 15 Gram(s) Oral once PRN Blood Glucose LESS THAN 70 milliGRAM(s)/deciliter  glucagon  Injectable 1 milliGRAM(s) IntraMuscular once PRN Glucose LESS THAN 70 milligrams/deciliter    	    PHYSICAL EXAM:  T(C): 36.4 (02-14-19 @ 05:28), Max: 36.4 (02-13-19 @ 20:22)  HR: 60 (02-14-19 @ 05:28) (51 - 71)  BP: 107/55 (02-14-19 @ 05:28) (107/52 - 133/74)  RR: 18 (02-14-19 @ 05:28) (18 - 20)  SpO2: 99% (02-14-19 @ 05:28) (99% - 100%)  Wt(kg): --  I&O's Summary    13 Feb 2019 07:01  -  14 Feb 2019 07:00  --------------------------------------------------------  IN: 2180 mL / OUT: 300 mL / NET: 1880 mL    14 Feb 2019 07:01  -  14 Feb 2019 09:51  --------------------------------------------------------  IN: 0 mL / OUT: 550 mL / NET: -550 mL        Appearance: Normal	  HEENT:   Normal oral mucosa, PERRL, EOMI	  Lymphatic: No lymphadenopathy  Cardiovascular: Normal S1 S2, No JVD, No murmurs, No edema  Respiratory: Lungs clear to auscultation	  Psychiatry: A & O x 3, Mood & affect appropriate  Gastrointestinal:  Soft, Non-tender, + BS	  Skin: No rashes, No ecchymoses, No cyanosis	  Neurologic: Non-focal  Extremities: Normal range of motion, No clubbing, cyanosis or edema  Vascular: Peripheral pulses palpable 2+ bilaterally    TELEMETRY: 	    ECG:  	  RADIOLOGY:  OTHER: 	  	  LABS:	 	    CARDIAC MARKERS:                                8.9    5.81  )-----------( 391      ( 14 Feb 2019 05:09 )             29.7     02-14    137  |  99  |  4<L>  ----------------------------<  147<H>  3.9   |  25  |  0.58    Ca    8.3<L>      14 Feb 2019 05:09  Phos  2.9     02-14  Mg     2.1     02-14    TPro  5.0<L>  /  Alb  2.5<L>  /  TBili  0.3  /  DBili  x   /  AST  7   /  ALT  6   /  AlkPhos  62  02-14    proBNP:   Lipid Profile:   HgA1c:   TSH:

## 2019-02-14 NOTE — PROGRESS NOTE ADULT - ASSESSMENT
84 y/o male w / pmhx BPH, prostate ca, CAD, MI s/p stents, DM, GERD, HLD, HTN, and recently diagnosed colon mass who was scheduled for resection today but had an episode of hypoglycemia to 29 this morning, likely secondary to excessive insulin administration.and sx cancelled     resolved hypoglycemia  fsg riss  no long acting insulin for now   mild iv flluids    - Continue clear liquid diet    cad  cont meds  can give lasix     mild hyponatremia /resolved  f/u bmp noted  fluid restrict     sx likely in am   medically optimized    dvt proph

## 2019-02-15 ENCOUNTER — RESULT REVIEW (OUTPATIENT)
Age: 84
End: 2019-02-15

## 2019-02-15 ENCOUNTER — APPOINTMENT (OUTPATIENT)
Dept: SURGICAL ONCOLOGY | Facility: HOSPITAL | Age: 84
End: 2019-02-15

## 2019-02-15 LAB
ANION GAP SERPL CALC-SCNC: 12 MMO/L — SIGNIFICANT CHANGE UP (ref 7–14)
ANION GAP SERPL CALC-SCNC: 14 MMO/L — SIGNIFICANT CHANGE UP (ref 7–14)
APTT BLD: 29.5 SEC — SIGNIFICANT CHANGE UP (ref 27.5–36.3)
BASE EXCESS BLDV CALC-SCNC: -2 MMOL/L — SIGNIFICANT CHANGE UP
BASE EXCESS BLDV CALC-SCNC: 0.5 MMOL/L — SIGNIFICANT CHANGE UP
BLD GP AB SCN SERPL QL: NEGATIVE — SIGNIFICANT CHANGE UP
BUN SERPL-MCNC: 3 MG/DL — LOW (ref 7–23)
BUN SERPL-MCNC: 4 MG/DL — LOW (ref 7–23)
CA-I SERPL-SCNC: 1.16 MMOL/L — SIGNIFICANT CHANGE UP (ref 1.15–1.29)
CA-I SERPL-SCNC: 1.18 MMOL/L — SIGNIFICANT CHANGE UP (ref 1.15–1.29)
CALCIUM SERPL-MCNC: 7.6 MG/DL — LOW (ref 8.4–10.5)
CALCIUM SERPL-MCNC: 8 MG/DL — LOW (ref 8.4–10.5)
CHLORIDE SERPL-SCNC: 101 MMOL/L — SIGNIFICANT CHANGE UP (ref 98–107)
CHLORIDE SERPL-SCNC: 101 MMOL/L — SIGNIFICANT CHANGE UP (ref 98–107)
CO2 SERPL-SCNC: 21 MMOL/L — LOW (ref 22–31)
CO2 SERPL-SCNC: 23 MMOL/L — SIGNIFICANT CHANGE UP (ref 22–31)
CREAT SERPL-MCNC: 0.62 MG/DL — SIGNIFICANT CHANGE UP (ref 0.5–1.3)
CREAT SERPL-MCNC: 0.63 MG/DL — SIGNIFICANT CHANGE UP (ref 0.5–1.3)
GAS PNL BLDV: 131 MMOL/L — LOW (ref 136–146)
GAS PNL BLDV: 132 MMOL/L — LOW (ref 136–146)
GLUCOSE BLDC GLUCOMTR-MCNC: 206 MG/DL — HIGH (ref 70–99)
GLUCOSE BLDC GLUCOMTR-MCNC: 243 MG/DL — HIGH (ref 70–99)
GLUCOSE BLDV-MCNC: 187 — HIGH (ref 70–99)
GLUCOSE BLDV-MCNC: 190 — HIGH (ref 70–99)
GLUCOSE SERPL-MCNC: 220 MG/DL — HIGH (ref 70–99)
GLUCOSE SERPL-MCNC: 296 MG/DL — HIGH (ref 70–99)
HCO3 BLDV-SCNC: 23 MMOL/L — SIGNIFICANT CHANGE UP (ref 20–27)
HCO3 BLDV-SCNC: 25 MMOL/L — SIGNIFICANT CHANGE UP (ref 20–27)
HCT VFR BLD CALC: 28.8 % — LOW (ref 39–50)
HCT VFR BLD CALC: 33.8 % — LOW (ref 39–50)
HCT VFR BLDV CALC: 24.5 % — LOW (ref 39–51)
HCT VFR BLDV CALC: 26.5 % — LOW (ref 39–51)
HGB BLD-MCNC: 10.9 G/DL — LOW (ref 13–17)
HGB BLD-MCNC: 8.9 G/DL — LOW (ref 13–17)
HGB BLDV-MCNC: 7.9 G/DL — LOW (ref 13–17)
HGB BLDV-MCNC: 8.5 G/DL — LOW (ref 13–17)
INR BLD: 1.3 — HIGH (ref 0.88–1.17)
MAGNESIUM SERPL-MCNC: 1.7 MG/DL — SIGNIFICANT CHANGE UP (ref 1.6–2.6)
MAGNESIUM SERPL-MCNC: 1.9 MG/DL — SIGNIFICANT CHANGE UP (ref 1.6–2.6)
MCHC RBC-ENTMCNC: 24.9 PG — LOW (ref 27–34)
MCHC RBC-ENTMCNC: 26.6 PG — LOW (ref 27–34)
MCHC RBC-ENTMCNC: 30.9 % — LOW (ref 32–36)
MCHC RBC-ENTMCNC: 32.2 % — SIGNIFICANT CHANGE UP (ref 32–36)
MCV RBC AUTO: 80.4 FL — SIGNIFICANT CHANGE UP (ref 80–100)
MCV RBC AUTO: 82.4 FL — SIGNIFICANT CHANGE UP (ref 80–100)
NRBC # FLD: 0 K/UL — LOW (ref 25–125)
NRBC # FLD: 0 K/UL — LOW (ref 25–125)
PCO2 BLDV: 39 MMHG — LOW (ref 41–51)
PCO2 BLDV: 40 MMHG — LOW (ref 41–51)
PH BLDV: 7.38 PH — SIGNIFICANT CHANGE UP (ref 7.32–7.43)
PH BLDV: 7.41 PH — SIGNIFICANT CHANGE UP (ref 7.32–7.43)
PHOSPHATE SERPL-MCNC: 2 MG/DL — LOW (ref 2.5–4.5)
PHOSPHATE SERPL-MCNC: 2.6 MG/DL — SIGNIFICANT CHANGE UP (ref 2.5–4.5)
PLATELET # BLD AUTO: 281 K/UL — SIGNIFICANT CHANGE UP (ref 150–400)
PLATELET # BLD AUTO: 367 K/UL — SIGNIFICANT CHANGE UP (ref 150–400)
PMV BLD: 8.4 FL — SIGNIFICANT CHANGE UP (ref 7–13)
PMV BLD: 8.6 FL — SIGNIFICANT CHANGE UP (ref 7–13)
PO2 BLDV: 50 MMHG — HIGH (ref 35–40)
PO2 BLDV: 51 MMHG — HIGH (ref 35–40)
POTASSIUM BLDV-SCNC: 3.1 MMOL/L — LOW (ref 3.4–4.5)
POTASSIUM BLDV-SCNC: 3.3 MMOL/L — LOW (ref 3.4–4.5)
POTASSIUM SERPL-MCNC: 3.8 MMOL/L — SIGNIFICANT CHANGE UP (ref 3.5–5.3)
POTASSIUM SERPL-MCNC: 4.3 MMOL/L — SIGNIFICANT CHANGE UP (ref 3.5–5.3)
POTASSIUM SERPL-SCNC: 3.8 MMOL/L — SIGNIFICANT CHANGE UP (ref 3.5–5.3)
POTASSIUM SERPL-SCNC: 4.3 MMOL/L — SIGNIFICANT CHANGE UP (ref 3.5–5.3)
PROTHROM AB SERPL-ACNC: 14.6 SEC — HIGH (ref 9.8–13.1)
RBC # BLD: 3.58 M/UL — LOW (ref 4.2–5.8)
RBC # BLD: 4.1 M/UL — LOW (ref 4.2–5.8)
RBC # FLD: 17.2 % — HIGH (ref 10.3–14.5)
RBC # FLD: 17.8 % — HIGH (ref 10.3–14.5)
RH IG SCN BLD-IMP: POSITIVE — SIGNIFICANT CHANGE UP
SAO2 % BLDV: 82.3 % — SIGNIFICANT CHANGE UP (ref 60–85)
SAO2 % BLDV: 82.6 % — SIGNIFICANT CHANGE UP (ref 60–85)
SODIUM SERPL-SCNC: 136 MMOL/L — SIGNIFICANT CHANGE UP (ref 135–145)
SODIUM SERPL-SCNC: 136 MMOL/L — SIGNIFICANT CHANGE UP (ref 135–145)
WBC # BLD: 5.02 K/UL — SIGNIFICANT CHANGE UP (ref 3.8–10.5)
WBC # BLD: 7.83 K/UL — SIGNIFICANT CHANGE UP (ref 3.8–10.5)
WBC # FLD AUTO: 5.02 K/UL — SIGNIFICANT CHANGE UP (ref 3.8–10.5)
WBC # FLD AUTO: 7.83 K/UL — SIGNIFICANT CHANGE UP (ref 3.8–10.5)

## 2019-02-15 PROCEDURE — 88309 TISSUE EXAM BY PATHOLOGIST: CPT | Mod: 26

## 2019-02-15 PROCEDURE — 44143 PARTIAL REMOVAL OF COLON: CPT

## 2019-02-15 PROCEDURE — 99232 SBSQ HOSP IP/OBS MODERATE 35: CPT | Mod: GC

## 2019-02-15 PROCEDURE — 88360 TUMOR IMMUNOHISTOCHEM/MANUAL: CPT | Mod: 26

## 2019-02-15 PROCEDURE — 50940 RELEASE OF URETER: CPT | Mod: RT

## 2019-02-15 PROCEDURE — 88305 TISSUE EXAM BY PATHOLOGIST: CPT | Mod: 26

## 2019-02-15 PROCEDURE — 88342 IMHCHEM/IMCYTCHM 1ST ANTB: CPT | Mod: 26,59

## 2019-02-15 PROCEDURE — 49204: CPT

## 2019-02-15 PROCEDURE — 88341 IMHCHEM/IMCYTCHM EA ADD ANTB: CPT | Mod: 26,59

## 2019-02-15 PROCEDURE — 44121 REMOVAL OF SMALL INTESTINE: CPT

## 2019-02-15 PROCEDURE — 44144 PARTIAL REMOVAL OF COLON: CPT

## 2019-02-15 PROCEDURE — 38747 REMOVE ABDOMINAL LYMPH NODES: CPT | Mod: 59

## 2019-02-15 PROCEDURE — 44120 REMOVAL OF SMALL INTESTINE: CPT

## 2019-02-15 RX ORDER — INSULIN LISPRO 100/ML
VIAL (ML) SUBCUTANEOUS EVERY 6 HOURS
Qty: 0 | Refills: 0 | Status: DISCONTINUED | OUTPATIENT
Start: 2019-02-15 | End: 2019-02-18

## 2019-02-15 RX ORDER — DEXTROSE 50 % IN WATER 50 %
25 SYRINGE (ML) INTRAVENOUS ONCE
Qty: 0 | Refills: 0 | Status: DISCONTINUED | OUTPATIENT
Start: 2019-02-15 | End: 2019-02-20

## 2019-02-15 RX ORDER — ACETAMINOPHEN 500 MG
1000 TABLET ORAL ONCE
Qty: 0 | Refills: 0 | Status: COMPLETED | OUTPATIENT
Start: 2019-02-16 | End: 2019-02-16

## 2019-02-15 RX ORDER — ONDANSETRON 8 MG/1
4 TABLET, FILM COATED ORAL EVERY 6 HOURS
Qty: 0 | Refills: 0 | Status: DISCONTINUED | OUTPATIENT
Start: 2019-02-15 | End: 2019-02-16

## 2019-02-15 RX ORDER — HYDROMORPHONE HYDROCHLORIDE 2 MG/ML
0.4 INJECTION INTRAMUSCULAR; INTRAVENOUS; SUBCUTANEOUS
Qty: 0 | Refills: 0 | Status: DISCONTINUED | OUTPATIENT
Start: 2019-02-15 | End: 2019-02-16

## 2019-02-15 RX ORDER — TAMSULOSIN HYDROCHLORIDE 0.4 MG/1
0.4 CAPSULE ORAL AT BEDTIME
Qty: 0 | Refills: 0 | Status: DISCONTINUED | OUTPATIENT
Start: 2019-02-15 | End: 2019-02-18

## 2019-02-15 RX ORDER — FAMOTIDINE 10 MG/ML
20 INJECTION INTRAVENOUS
Qty: 0 | Refills: 0 | Status: DISCONTINUED | OUTPATIENT
Start: 2019-02-15 | End: 2019-02-20

## 2019-02-15 RX ORDER — CHLORHEXIDINE GLUCONATE 213 G/1000ML
1 SOLUTION TOPICAL ONCE
Qty: 0 | Refills: 0 | Status: DISCONTINUED | OUTPATIENT
Start: 2019-02-15 | End: 2019-02-15

## 2019-02-15 RX ORDER — POTASSIUM PHOSPHATE, MONOBASIC POTASSIUM PHOSPHATE, DIBASIC 236; 224 MG/ML; MG/ML
15 INJECTION, SOLUTION INTRAVENOUS ONCE
Qty: 0 | Refills: 0 | Status: COMPLETED | OUTPATIENT
Start: 2019-02-15 | End: 2019-02-16

## 2019-02-15 RX ORDER — HYDROMORPHONE HYDROCHLORIDE 2 MG/ML
0.5 INJECTION INTRAMUSCULAR; INTRAVENOUS; SUBCUTANEOUS EVERY 4 HOURS
Qty: 0 | Refills: 0 | Status: DISCONTINUED | OUTPATIENT
Start: 2019-02-15 | End: 2019-02-19

## 2019-02-15 RX ORDER — DEXTROSE 50 % IN WATER 50 %
12.5 SYRINGE (ML) INTRAVENOUS ONCE
Qty: 0 | Refills: 0 | Status: DISCONTINUED | OUTPATIENT
Start: 2019-02-15 | End: 2019-02-20

## 2019-02-15 RX ORDER — SODIUM CHLORIDE 9 MG/ML
1000 INJECTION, SOLUTION INTRAVENOUS
Qty: 0 | Refills: 0 | Status: DISCONTINUED | OUTPATIENT
Start: 2019-02-15 | End: 2019-02-15

## 2019-02-15 RX ORDER — SODIUM CHLORIDE 9 MG/ML
1000 INJECTION, SOLUTION INTRAVENOUS
Qty: 0 | Refills: 0 | Status: DISCONTINUED | OUTPATIENT
Start: 2019-02-15 | End: 2019-02-20

## 2019-02-15 RX ORDER — ATENOLOL 25 MG/1
50 TABLET ORAL DAILY
Qty: 0 | Refills: 0 | Status: DISCONTINUED | OUTPATIENT
Start: 2019-02-15 | End: 2019-02-20

## 2019-02-15 RX ORDER — HYDROMORPHONE HYDROCHLORIDE 2 MG/ML
0.2 INJECTION INTRAMUSCULAR; INTRAVENOUS; SUBCUTANEOUS
Qty: 0 | Refills: 0 | Status: DISCONTINUED | OUTPATIENT
Start: 2019-02-15 | End: 2019-02-16

## 2019-02-15 RX ORDER — ENOXAPARIN SODIUM 100 MG/ML
40 INJECTION SUBCUTANEOUS DAILY
Qty: 0 | Refills: 0 | Status: DISCONTINUED | OUTPATIENT
Start: 2019-02-15 | End: 2019-02-20

## 2019-02-15 RX ORDER — SODIUM CHLORIDE 9 MG/ML
1000 INJECTION, SOLUTION INTRAVENOUS
Qty: 0 | Refills: 0 | Status: DISCONTINUED | OUTPATIENT
Start: 2019-02-15 | End: 2019-02-16

## 2019-02-15 RX ORDER — GLUCAGON INJECTION, SOLUTION 0.5 MG/.1ML
1 INJECTION, SOLUTION SUBCUTANEOUS ONCE
Qty: 0 | Refills: 0 | Status: DISCONTINUED | OUTPATIENT
Start: 2019-02-15 | End: 2019-02-20

## 2019-02-15 RX ORDER — DEXTROSE 50 % IN WATER 50 %
15 SYRINGE (ML) INTRAVENOUS ONCE
Qty: 0 | Refills: 0 | Status: DISCONTINUED | OUTPATIENT
Start: 2019-02-15 | End: 2019-02-20

## 2019-02-15 RX ADMIN — ENOXAPARIN SODIUM 40 MILLIGRAM(S): 100 INJECTION SUBCUTANEOUS at 12:53

## 2019-02-15 RX ADMIN — Medication 2: at 08:42

## 2019-02-15 RX ADMIN — Medication 2: at 12:53

## 2019-02-15 RX ADMIN — HYDROMORPHONE HYDROCHLORIDE 0.4 MILLIGRAM(S): 2 INJECTION INTRAMUSCULAR; INTRAVENOUS; SUBCUTANEOUS at 20:45

## 2019-02-15 RX ADMIN — FAMOTIDINE 20 MILLIGRAM(S): 10 INJECTION INTRAVENOUS at 05:23

## 2019-02-15 RX ADMIN — TAMSULOSIN HYDROCHLORIDE 0.4 MILLIGRAM(S): 0.4 CAPSULE ORAL at 22:17

## 2019-02-15 RX ADMIN — HYDROMORPHONE HYDROCHLORIDE 0.4 MILLIGRAM(S): 2 INJECTION INTRAMUSCULAR; INTRAVENOUS; SUBCUTANEOUS at 21:00

## 2019-02-15 NOTE — PRE-OP CHECKLIST - SELECT TESTS ORDERED
CBC/Type and Screen  at 12:30/CBC/Type and Cross/Type and Screen/POCT Blood Glucose  at 12:30 see MAR for coverage/CBC/Type and Cross/Type and Screen/POCT Blood Glucose

## 2019-02-15 NOTE — PROGRESS NOTE ADULT - SUBJECTIVE AND OBJECTIVE BOX
GENERAL SURGERY DAILY PROGRESS NOTE:     Subjective:  Pt seen and examined. No acute events overnight. Pt w/o any complaints this am. Plan for OR today.     Objective:  NAD, awake and alert  Respirations nonlabored  Abdomen soft, nontender, nondistended  No guarding or rebound tenderness      MEDICATIONS  (STANDING):  ATENolol  Tablet 50 milliGRAM(s) Oral daily  dextrose 5% + sodium chloride 0.45% with potassium chloride 20 mEq/L 1000 milliLiter(s) (75 mL/Hr) IV Continuous <Continuous>  dextrose 5%. 1000 milliLiter(s) (50 mL/Hr) IV Continuous <Continuous>  dextrose 50% Injectable 12.5 Gram(s) IV Push once  dextrose 50% Injectable 25 Gram(s) IV Push once  dextrose 50% Injectable 25 Gram(s) IV Push once  enoxaparin Injectable 40 milliGRAM(s) SubCutaneous daily  famotidine    Tablet 20 milliGRAM(s) Oral two times a day  insulin lispro (HumaLOG) corrective regimen sliding scale   SubCutaneous three times a day before meals  tamsulosin 0.4 milliGRAM(s) Oral at bedtime    MEDICATIONS  (PRN):  dextrose 40% Gel 15 Gram(s) Oral once PRN Blood Glucose LESS THAN 70 milliGRAM(s)/deciliter  glucagon  Injectable 1 milliGRAM(s) IntraMuscular once PRN Glucose LESS THAN 70 milligrams/deciliter      Vital Signs Last 24 Hrs  T(C): 36.6 (15 Feb 2019 08:05), Max: 36.6 (15 Feb 2019 05:02)  T(F): 97.8 (15 Feb 2019 08:05), Max: 97.8 (15 Feb 2019 05:02)  HR: 82 (15 Feb 2019 08:05) (75 - 83)  BP: 110/62 (15 Feb 2019 08:05) (97/55 - 113/62)  BP(mean): --  RR: 17 (15 Feb 2019 08:05) (17 - 18)  SpO2: 99% (15 Feb 2019 08:05) (99% - 100%)    I&O's Detail    14 Feb 2019 07:01  -  15 Feb 2019 07:00  --------------------------------------------------------  IN:    dextrose 5% + sodium chloride 0.45% with potassium chloride 20 mEq/L: 525 mL    Oral Fluid: 960 mL  Total IN: 1485 mL    OUT:    Voided: 1050 mL  Total OUT: 1050 mL    Total NET: 435 mL          Daily     Daily     LABS:                        8.9    5.02  )-----------( 367      ( 15 Feb 2019 06:15 )             28.8     02-14    137  |  99  |  4<L>  ----------------------------<  147<H>  3.9   |  25  |  0.58    Ca    8.3<L>      14 Feb 2019 05:09  Phos  2.9     02-14  Mg     2.1     02-14    TPro  5.0<L>  /  Alb  2.5<L>  /  TBili  0.3  /  DBili  x   /  AST  7   /  ALT  6   /  AlkPhos  62  02-14    PT/INR - ( 15 Feb 2019 06:15 )   PT: 14.6 SEC;   INR: 1.30          PTT - ( 15 Feb 2019 06:15 )  PTT:29.5 SEC      RADIOLOGY & ADDITIONAL STUDIES:

## 2019-02-15 NOTE — PROGRESS NOTE ADULT - ASSESSMENT
83M pmhx BPH, prostate ca, CAD, MI s/p stents, DM, GERD, HLD, HTN, and recently diagnosed colon mass who was scheduled for resection today but had an episode of hypoglycemia to 29 prior to presentation, likely secondary to excessive insulin administration. Plan for OR today.     - OR, will need post op check  - Abx bowel prep  - f/u labs  - C/w home meds except Lasix   - OOB/PT  - DVT ppx    Surgery [id number]

## 2019-02-15 NOTE — PROGRESS NOTE ADULT - SUBJECTIVE AND OBJECTIVE BOX
CHIEF COMPLAINT:Patient is a 83y old  Male who presents with a chief complaint of hypoglycemia (14 Feb 2019 15:37)    	        PAST MEDICAL & SURGICAL HISTORY:  Lymphoma  Benign neoplasm of colon  Prostate cancer  Stented coronary artery: 2006  MI (myocardial infarction)  CAD (coronary artery disease)  HLD (hyperlipidemia)  GERD (gastroesophageal reflux disease)  Benign prostatic hypertrophy  Diabetes: Type II  HTN (hypertension)  CAD (coronary artery disease): 1 cardiac stent 2006 was on plavix, currently pt is not on aspirin  History of prostate surgery: 2007 seed implants          REVIEW OF SYSTEMS:  CONSTITUTIONAL: feels well   EYES: No eye pain, visual disturbances, or discharge  NECK: No pain or stiffness  RESPIRATORY: No cough, wheezing, chills or hemoptysis; No Shortness of Breath  CARDIOVASCULAR: No chest pain, palpitations, passing out, dizziness, or leg swelling  GASTROINTESTINAL: No abdominal or epigastric pain. No nausea, vomiting, or hematemesis; No diarrhea or constipation. No melena or hematochezia.  GENITOURINARY: No dysuria, frequency, hematuria, or incontinence  NEUROLOGICAL: No headaches, memory loss, loss of strength, numbness, or tremors  SKIN: No itching, burning, rashes, or lesions   LYMPH Nodes: No enlarged glands  ENDOCRINE: No heat or cold intolerance; No hair loss  MUSCULOSKELETAL: No joint pain or swelling; No muscle, back, or extremity pain    Medications:  MEDICATIONS  (STANDING):  ATENolol  Tablet 50 milliGRAM(s) Oral daily  dextrose 5% + sodium chloride 0.45% with potassium chloride 20 mEq/L 1000 milliLiter(s) (75 mL/Hr) IV Continuous <Continuous>  dextrose 5%. 1000 milliLiter(s) (50 mL/Hr) IV Continuous <Continuous>  dextrose 50% Injectable 12.5 Gram(s) IV Push once  dextrose 50% Injectable 25 Gram(s) IV Push once  dextrose 50% Injectable 25 Gram(s) IV Push once  enoxaparin Injectable 40 milliGRAM(s) SubCutaneous daily  famotidine    Tablet 20 milliGRAM(s) Oral two times a day  insulin lispro (HumaLOG) corrective regimen sliding scale   SubCutaneous three times a day before meals  tamsulosin 0.4 milliGRAM(s) Oral at bedtime    MEDICATIONS  (PRN):  dextrose 40% Gel 15 Gram(s) Oral once PRN Blood Glucose LESS THAN 70 milliGRAM(s)/deciliter  glucagon  Injectable 1 milliGRAM(s) IntraMuscular once PRN Glucose LESS THAN 70 milligrams/deciliter    	    PHYSICAL EXAM:  T(C): 36.6 (02-15-19 @ 08:05), Max: 36.6 (02-15-19 @ 05:02)  HR: 82 (02-15-19 @ 08:05) (75 - 83)  BP: 110/62 (02-15-19 @ 08:05) (97/55 - 113/62)  RR: 17 (02-15-19 @ 08:05) (17 - 18)  SpO2: 99% (02-15-19 @ 08:05) (99% - 100%)  Wt(kg): --  I&O's Summary    14 Feb 2019 07:01  -  15 Feb 2019 07:00  --------------------------------------------------------  IN: 1485 mL / OUT: 1050 mL / NET: 435 mL        Appearance: Normal	  HEENT:   Normal oral mucosa, PERRL, EOMI	  Lymphatic: No lymphadenopathy  Cardiovascular: Normal S1 S2, No JVD, No murmurs, No edema  Respiratory: Lungs clear to auscultation	  Psychiatry: A & O  Gastrointestinal:  Soft, Non-tender, + BS	  Skin: No rashes, No ecchymoses, No cyanosis	  Neurologic: Non-focal  Extremities: Normal range of motion, No clubbing, cyanosis or edema  Vascular: Peripheral pulses palpable 2+ bilaterally    TELEMETRY: 	    ECG:  	  RADIOLOGY:  OTHER: 	  	  LABS:	 	    CARDIAC MARKERS:                                8.9    5.02  )-----------( 367      ( 15 Feb 2019 06:15 )             28.8     02-14    137  |  99  |  4<L>  ----------------------------<  147<H>  3.9   |  25  |  0.58    Ca    8.3<L>      14 Feb 2019 05:09  Phos  2.9     02-14  Mg     2.1     02-14    TPro  5.0<L>  /  Alb  2.5<L>  /  TBili  0.3  /  DBili  x   /  AST  7   /  ALT  6   /  AlkPhos  62  02-14    proBNP:   Lipid Profile:   HgA1c:   TSH:

## 2019-02-15 NOTE — PROGRESS NOTE ADULT - PROBLEM SELECTOR PLAN 1
-Patient with Type 2 DM -A1C 6.2. (6.8% in oct 2018 as well) Given patients age and comorbidities concern about too stringent control of DM and risks of hypoglycemia.  -Now with hyperglycemia. Though patient was on some D5 overnight while NPO, given c-peptide 0.2 (low), recommend starting low dose basal insulin- For tonight can give lantus 5 units qhs. C/w low dose humalog correctional scale for now with meals and q6hrs when NPO. As patient starts eating post-op will add humalog as needed.  -D/C plan likely metformin and basal insulin.  -Goal A1C 7.5-8%. -Patient with Type 2 DM -A1C 6.2. (6.8% in oct 2018 as well) Given patients age and comorbidities concern about too stringent control of DM and risks of hypoglycemia.  -Now with hyperglycemia. Though patient was on some D5 overnight while NPO, given c-peptide 0.2 (low), recommend starting low dose basal insulin- For tonight can give lantus 5 units qhs. C/w low dose humalog correctional scale for now with meals and q6hrs when NPO. As patient starts eating post-op will add humalog as needed.  -D/C plan likely basal insulin vs. basal/bolus insulin vs. mixed insulin but at lower doses than before. Can stop metformin as likely not helpful given low c-peptide and body weight not increased.  -Goal A1C 7.5-8%.

## 2019-02-15 NOTE — BRIEF OPERATIVE NOTE - PROCEDURE
<<-----Click on this checkbox to enter Procedure Small bowel resection  02/15/2019    Active  [id number]  Exploratory laparotomy  02/15/2019    Active  [id number]  Right colectomy  02/15/2019    Active  [id number]  End ileostomy  02/15/2019    Active  [id number]  Ureterolysis  02/15/2019  right  Active  [id number]

## 2019-02-15 NOTE — BRIEF OPERATIVE NOTE - OPERATION/FINDINGS
exploratory laparotomy  large cecal mass invading RP and small bowel mesentery  right hemicolectomy w/ureterolysis, small bowel resection (10 cm approx 100cm from LoT)  end ileostomy  abdomen closed with looped PDS and retention sutures, skin closed with staples exploratory laparotomy  large cecal mass invading RP and small bowel   right hemicolectomy w/ureterolysis, small bowel resection (10 cm approx 100cm from LoT)  end ileostomy  abdomen closed with looped PDS and retention sutures, skin closed with staples

## 2019-02-15 NOTE — PROGRESS NOTE ADULT - ASSESSMENT
82 y/o male w / pmhx BPH, prostate ca, CAD, MI s/p stents, DM, GERD, HLD, HTN, and recently diagnosed colon mass who was scheduled for resection today but had an episode of hypoglycemia to 29 this morning, likely secondary to excessive insulin administration.and sx cancelled     resolved hypoglycemia  fsg riss  no long acting insulin for now   mild iv flluids  would change fluid to ns   f/u bmp     npo ? for sx today         cad  cont meds      mild hyponatremia /improved  f/u bmp today   fluid restrict     medically optimized    dvt proph

## 2019-02-15 NOTE — PROGRESS NOTE ADULT - ATTENDING COMMENTS
Agree with plan as outlined.
Agree with plan as outlined. Low c-peptide so patient will require to be maintained on insulin based plan but at significantly lower doses than prior to admission.

## 2019-02-15 NOTE — CHART NOTE - NSCHARTNOTEFT_GEN_A_CORE
Post Operative Note      Procedure: exploratory laparotomy, right colectomy, small bowel resection, end ileostomy creation    Subjective: Patient was seen and examined in PACU. He states that his pain is well controlled. He denies nausea/vomiting. Patient received 2u pRBCs intraoperatively     Objective:    T(C): 37.6 (02-15-19 @ 19:50), Max: 37.6 (02-15-19 @ 19:50)  HR: 80 (02-15-19 @ 23:00) (72 - 100)  BP: 121/64 (02-15-19 @ 23:00) (88/50 - 131/67)  RR: 16 (02-15-19 @ 23:00) (9 - 20)  SpO2: 98% (02-15-19 @ 23:00) (93% - 100%)      02-14-19 @ 07:01  -  02-15-19 @ 07:00  --------------------------------------------------------  IN: 1485 mL / OUT: 1050 mL / NET: 435 mL    02-15-19 @ 07:01  -  02-15-19 @ 23:48  --------------------------------------------------------  IN: 300 mL / OUT: 145 mL / NET: 155 mL        Physical Exam:  General: NAD  Abdomen: soft, appropriately tender, dressing with serosang saturation, stoma pink, ostomy bag empty  Maria in situ with yellow urine      Medications:     MEDICATIONS  (STANDING):  ATENolol  Tablet 50 milliGRAM(s) Oral daily  dextrose 5%. 1000 milliLiter(s) (50 mL/Hr) IV Continuous <Continuous>  dextrose 50% Injectable 12.5 Gram(s) IV Push once  dextrose 50% Injectable 25 Gram(s) IV Push once  dextrose 50% Injectable 25 Gram(s) IV Push once  enoxaparin Injectable 40 milliGRAM(s) SubCutaneous daily  famotidine    Tablet 20 milliGRAM(s) Oral two times a day  insulin lispro (HumaLOG) corrective regimen sliding scale   SubCutaneous every 6 hours  lactated ringers. 1000 milliLiter(s) (100 mL/Hr) IV Continuous <Continuous>  potassium phosphate IVPB 15 milliMole(s) IV Intermittent once  tamsulosin 0.4 milliGRAM(s) Oral at bedtime    MEDICATIONS  (PRN):  dextrose 40% Gel 15 Gram(s) Oral once PRN Blood Glucose LESS THAN 70 milliGRAM(s)/deciliter  glucagon  Injectable 1 milliGRAM(s) IntraMuscular once PRN Glucose LESS THAN 70 milligrams/deciliter  HYDROmorphone  Injectable 0.5 milliGRAM(s) IV Push every 4 hours PRN Moderate Pain (4 - 6)  HYDROmorphone  Injectable 0.2 milliGRAM(s) IV Push every 15 minutes PRN Moderate Pain (4 - 6)  HYDROmorphone  Injectable 0.4 milliGRAM(s) IV Push every 15 minutes PRN Severe Pain (7 - 10)  ondansetron Injectable 4 milliGRAM(s) IV Push every 6 hours PRN Nausea and/or Vomiting      Labs/Studies:                            10.9   7.83  )-----------( 281      ( 15 Feb 2019 20:15 )             33.8     02-15    136  |  101  |  4<L>  ----------------------------<  220<H>  3.8   |  23  |  0.62    Ca    7.6<L>      15 Feb 2019 20:15  Phos  2.6     02-15  Mg     1.7     02-15    TPro  5.0<L>  /  Alb  2.5<L>  /  TBili  0.3  /  DBili  x   /  AST  7   /  ALT  6   /  AlkPhos  62  02-14      Assessment/Plan:    83M with colon cancer s/p exploratory laparotomy, right colectomy, small bowel resection, end ileostomy creation      - pain control  - await bowel function  - diet: NPO  - DVT ppx

## 2019-02-15 NOTE — PROGRESS NOTE ADULT - SUBJECTIVE AND OBJECTIVE BOX
Chief Complaint:  Type 2 DM with hypoglycemia    History: Patient now with hyperglycemia. NPO currently. Going to OR for colonic mass resection.    MEDICATIONS  (STANDING):    MEDICATIONS  (PRN):      Allergies    No Known Allergies    Intolerances      PHYSICAL EXAM:  VITALS: T(C): 36.9 (02-15-19 @ 13:48)  T(F): 98.4 (02-15-19 @ 13:48), Max: 98.4 (02-15-19 @ 13:48)  HR: 93 (02-15-19 @ 13:48) (75 - 100)  BP: 119/46 (02-15-19 @ 13:48) (97/55 - 119/46)  RR:  (16 - 18)  SpO2:  (98% - 100%)  Wt(kg): --  GENERAL: NAD, well-groomed, well-developed  EYES: No proptosis, no lid lag, anicteric  HEENT:  Atraumatic, Normocephalic, moist mucous membranes  SKIN: Dry, intact, No rashes or lesions  MUSCULOSKELETAL: Full range of motion, normal strength  PSYCH: Alert and oriented x 3, normal affect, normal mood    POCT Blood Glucose.: 206 mg/dL (02-15-19 @ 12:30)  POCT Blood Glucose.: 243 mg/dL (02-15-19 @ 08:37)  POCT Blood Glucose.: 208 mg/dL (02-14-19 @ 20:54)  POCT Blood Glucose.: 147 mg/dL (02-14-19 @ 17:15)  POCT Blood Glucose.: 213 mg/dL (02-14-19 @ 12:23)  POCT Blood Glucose.: 169 mg/dL (02-14-19 @ 08:40)  POCT Blood Glucose.: 133 mg/dL (02-13-19 @ 21:06)  POCT Blood Glucose.: 73 mg/dL (02-13-19 @ 17:56)  POCT Blood Glucose.: 81 mg/dL (02-13-19 @ 12:07)  POCT Blood Glucose.: 125 mg/dL (02-13-19 @ 07:59)  POCT Blood Glucose.: 79 mg/dL (02-13-19 @ 05:49)      02-15    136  |  101  |  3<L>  ----------------------------<  296<H>  4.3   |  21<L>  |  0.63    EGFR if : 106  EGFR if non : 92    Ca    8.0<L>      02-15  Mg     1.9     02-15  Phos  2.0     02-15    TPro  5.0<L>  /  Alb  2.5<L>  /  TBili  0.3  /  DBili  x   /  AST  7   /  ALT  6   /  AlkPhos  62  02-14            Hemoglobin A1C, Whole Blood: 6.2 % <H> [4.0 - 5.6] (01-31-19 @ 11:05) Chief Complaint:  Type 2 DM with hypoglycemia    History: Patient now with hyperglycemia. NPO currently. Going to OR for colonic mass resection.    ROS: unable to obtain    MEDICATIONS  (STANDING):    MEDICATIONS  (PRN):      Allergies    No Known Allergies    Intolerances      PHYSICAL EXAM:  VITALS: T(C): 36.9 (02-15-19 @ 13:48)  T(F): 98.4 (02-15-19 @ 13:48), Max: 98.4 (02-15-19 @ 13:48)  HR: 93 (02-15-19 @ 13:48) (75 - 100)  BP: 119/46 (02-15-19 @ 13:48) (97/55 - 119/46)  RR:  (16 - 18)  SpO2:  (98% - 100%)  Wt(kg): --  GENERAL: NAD, well-groomed, well-developed  EYES: No proptosis, no lid lag, anicteric  HEENT:  Atraumatic, Normocephalic, moist mucous membranes  SKIN: Dry, intact, No rashes or lesions  MUSCULOSKELETAL: Full range of motion, normal strength  PSYCH: Alert and oriented x 3, normal affect, normal mood    POCT Blood Glucose.: 206 mg/dL (02-15-19 @ 12:30)  POCT Blood Glucose.: 243 mg/dL (02-15-19 @ 08:37)  POCT Blood Glucose.: 208 mg/dL (02-14-19 @ 20:54)  POCT Blood Glucose.: 147 mg/dL (02-14-19 @ 17:15)  POCT Blood Glucose.: 213 mg/dL (02-14-19 @ 12:23)  POCT Blood Glucose.: 169 mg/dL (02-14-19 @ 08:40)  POCT Blood Glucose.: 133 mg/dL (02-13-19 @ 21:06)  POCT Blood Glucose.: 73 mg/dL (02-13-19 @ 17:56)  POCT Blood Glucose.: 81 mg/dL (02-13-19 @ 12:07)  POCT Blood Glucose.: 125 mg/dL (02-13-19 @ 07:59)  POCT Blood Glucose.: 79 mg/dL (02-13-19 @ 05:49)      02-15    136  |  101  |  3<L>  ----------------------------<  296<H>  4.3   |  21<L>  |  0.63    EGFR if : 106  EGFR if non : 92    Ca    8.0<L>      02-15  Mg     1.9     02-15  Phos  2.0     02-15    TPro  5.0<L>  /  Alb  2.5<L>  /  TBili  0.3  /  DBili  x   /  AST  7   /  ALT  6   /  AlkPhos  62  02-14            Hemoglobin A1C, Whole Blood: 6.2 % <H> [4.0 - 5.6] (01-31-19 @ 11:05)

## 2019-02-16 LAB
ANION GAP SERPL CALC-SCNC: 18 MMO/L — HIGH (ref 7–14)
APPEARANCE UR: SIGNIFICANT CHANGE UP
BACTERIA # UR AUTO: NEGATIVE — SIGNIFICANT CHANGE UP
BILIRUB UR-MCNC: NEGATIVE — SIGNIFICANT CHANGE UP
BLOOD UR QL VISUAL: NEGATIVE — SIGNIFICANT CHANGE UP
BUN SERPL-MCNC: 5 MG/DL — LOW (ref 7–23)
CALCIUM SERPL-MCNC: 8.5 MG/DL — SIGNIFICANT CHANGE UP (ref 8.4–10.5)
CHLORIDE SERPL-SCNC: 99 MMOL/L — SIGNIFICANT CHANGE UP (ref 98–107)
CO2 SERPL-SCNC: 20 MMOL/L — LOW (ref 22–31)
COLOR SPEC: YELLOW — SIGNIFICANT CHANGE UP
CREAT SERPL-MCNC: 0.64 MG/DL — SIGNIFICANT CHANGE UP (ref 0.5–1.3)
GLUCOSE BLDC GLUCOMTR-MCNC: 149 MG/DL — HIGH (ref 70–99)
GLUCOSE BLDC GLUCOMTR-MCNC: 189 MG/DL — HIGH (ref 70–99)
GLUCOSE BLDC GLUCOMTR-MCNC: 190 MG/DL — HIGH (ref 70–99)
GLUCOSE BLDC GLUCOMTR-MCNC: 213 MG/DL — HIGH (ref 70–99)
GLUCOSE BLDC GLUCOMTR-MCNC: 250 MG/DL — HIGH (ref 70–99)
GLUCOSE SERPL-MCNC: 205 MG/DL — HIGH (ref 70–99)
GLUCOSE UR-MCNC: 30 — SIGNIFICANT CHANGE UP
GRAN CASTS # UR COMP ASSIST: SIGNIFICANT CHANGE UP
HCT VFR BLD CALC: 42.1 % — SIGNIFICANT CHANGE UP (ref 39–50)
HGB BLD-MCNC: 13.3 G/DL — SIGNIFICANT CHANGE UP (ref 13–17)
HYALINE CASTS # UR AUTO: HIGH
KETONES UR-MCNC: SIGNIFICANT CHANGE UP
LEUKOCYTE ESTERASE UR-ACNC: NEGATIVE — SIGNIFICANT CHANGE UP
MAGNESIUM SERPL-MCNC: 1.8 MG/DL — SIGNIFICANT CHANGE UP (ref 1.6–2.6)
MCHC RBC-ENTMCNC: 26.3 PG — LOW (ref 27–34)
MCHC RBC-ENTMCNC: 31.6 % — LOW (ref 32–36)
MCV RBC AUTO: 83.2 FL — SIGNIFICANT CHANGE UP (ref 80–100)
NITRITE UR-MCNC: NEGATIVE — SIGNIFICANT CHANGE UP
NRBC # FLD: 0 K/UL — LOW (ref 25–125)
PH UR: 6 — SIGNIFICANT CHANGE UP (ref 5–8)
PHOSPHATE SERPL-MCNC: 2.7 MG/DL — SIGNIFICANT CHANGE UP (ref 2.5–4.5)
PLATELET # BLD AUTO: 365 K/UL — SIGNIFICANT CHANGE UP (ref 150–400)
PMV BLD: 8.5 FL — SIGNIFICANT CHANGE UP (ref 7–13)
POTASSIUM SERPL-MCNC: 3.7 MMOL/L — SIGNIFICANT CHANGE UP (ref 3.5–5.3)
POTASSIUM SERPL-SCNC: 3.7 MMOL/L — SIGNIFICANT CHANGE UP (ref 3.5–5.3)
PROT UR-MCNC: 50 — SIGNIFICANT CHANGE UP
RBC # BLD: 5.06 M/UL — SIGNIFICANT CHANGE UP (ref 4.2–5.8)
RBC # FLD: 17.2 % — HIGH (ref 10.3–14.5)
RBC CASTS # UR COMP ASSIST: SIGNIFICANT CHANGE UP (ref 0–?)
SODIUM SERPL-SCNC: 137 MMOL/L — SIGNIFICANT CHANGE UP (ref 135–145)
SP GR SPEC: 1.03 — SIGNIFICANT CHANGE UP (ref 1–1.04)
SQUAMOUS # UR AUTO: SIGNIFICANT CHANGE UP
UROBILINOGEN FLD QL: NORMAL — SIGNIFICANT CHANGE UP
WBC # BLD: 9.79 K/UL — SIGNIFICANT CHANGE UP (ref 3.8–10.5)
WBC # FLD AUTO: 9.79 K/UL — SIGNIFICANT CHANGE UP (ref 3.8–10.5)
WBC UR QL: HIGH (ref 0–?)

## 2019-02-16 RX ORDER — DEXTROSE MONOHYDRATE, SODIUM CHLORIDE, AND POTASSIUM CHLORIDE 50; .745; 4.5 G/1000ML; G/1000ML; G/1000ML
1000 INJECTION, SOLUTION INTRAVENOUS
Qty: 0 | Refills: 0 | Status: DISCONTINUED | OUTPATIENT
Start: 2019-02-16 | End: 2019-02-18

## 2019-02-16 RX ORDER — POTASSIUM PHOSPHATE, MONOBASIC POTASSIUM PHOSPHATE, DIBASIC 236; 224 MG/ML; MG/ML
15 INJECTION, SOLUTION INTRAVENOUS ONCE
Qty: 0 | Refills: 0 | Status: COMPLETED | OUTPATIENT
Start: 2019-02-16 | End: 2019-02-16

## 2019-02-16 RX ORDER — MAGNESIUM SULFATE 500 MG/ML
2 VIAL (ML) INJECTION ONCE
Qty: 0 | Refills: 0 | Status: COMPLETED | OUTPATIENT
Start: 2019-02-16 | End: 2019-02-16

## 2019-02-16 RX ORDER — ACETAMINOPHEN 500 MG
1000 TABLET ORAL ONCE
Qty: 0 | Refills: 0 | Status: COMPLETED | OUTPATIENT
Start: 2019-02-17 | End: 2019-02-17

## 2019-02-16 RX ADMIN — Medication 400 MILLIGRAM(S): at 18:43

## 2019-02-16 RX ADMIN — POTASSIUM PHOSPHATE, MONOBASIC POTASSIUM PHOSPHATE, DIBASIC 62.5 MILLIMOLE(S): 236; 224 INJECTION, SOLUTION INTRAVENOUS at 09:53

## 2019-02-16 RX ADMIN — Medication 1000 MILLIGRAM(S): at 02:40

## 2019-02-16 RX ADMIN — Medication 400 MILLIGRAM(S): at 02:25

## 2019-02-16 RX ADMIN — Medication 400 MILLIGRAM(S): at 13:01

## 2019-02-16 RX ADMIN — FAMOTIDINE 20 MILLIGRAM(S): 10 INJECTION INTRAVENOUS at 17:25

## 2019-02-16 RX ADMIN — Medication 2: at 06:41

## 2019-02-16 RX ADMIN — Medication 2: at 00:45

## 2019-02-16 RX ADMIN — DEXTROSE MONOHYDRATE, SODIUM CHLORIDE, AND POTASSIUM CHLORIDE 100 MILLILITER(S): 50; .745; 4.5 INJECTION, SOLUTION INTRAVENOUS at 21:22

## 2019-02-16 RX ADMIN — POTASSIUM PHOSPHATE, MONOBASIC POTASSIUM PHOSPHATE, DIBASIC 62.5 MILLIMOLE(S): 236; 224 INJECTION, SOLUTION INTRAVENOUS at 06:42

## 2019-02-16 RX ADMIN — TAMSULOSIN HYDROCHLORIDE 0.4 MILLIGRAM(S): 0.4 CAPSULE ORAL at 21:21

## 2019-02-16 RX ADMIN — Medication 400 MILLIGRAM(S): at 09:08

## 2019-02-16 RX ADMIN — FAMOTIDINE 20 MILLIGRAM(S): 10 INJECTION INTRAVENOUS at 06:41

## 2019-02-16 RX ADMIN — Medication 1000 MILLIGRAM(S): at 11:04

## 2019-02-16 RX ADMIN — Medication 1: at 18:43

## 2019-02-16 RX ADMIN — Medication 50 GRAM(S): at 09:54

## 2019-02-16 RX ADMIN — Medication 1: at 13:01

## 2019-02-16 RX ADMIN — ENOXAPARIN SODIUM 40 MILLIGRAM(S): 100 INJECTION SUBCUTANEOUS at 13:01

## 2019-02-16 RX ADMIN — Medication 1000 MILLIGRAM(S): at 17:01

## 2019-02-16 NOTE — PROVIDER CONTACT NOTE (OTHER) - ACTION/TREATMENT ORDERED:
MD order heat packs, hypothermia blanket, RN to reassess temp in 1 hour. MD order heat packs, hyperthermia blanket, RN to reassess temp in 1 hour.

## 2019-02-16 NOTE — PROGRESS NOTE ADULT - SUBJECTIVE AND OBJECTIVE BOX
Chief Complaint: DM2    History: pt. still NPO, denies n/v     MEDICATIONS  (STANDING):  acetaminophen  IVPB .. 1000 milliGRAM(s) IV Intermittent once  ATENolol  Tablet 50 milliGRAM(s) Oral daily  dextrose 5% + sodium chloride 0.45% with potassium chloride 20 mEq/L 1000 milliLiter(s) (100 mL/Hr) IV Continuous <Continuous>  dextrose 5%. 1000 milliLiter(s) (50 mL/Hr) IV Continuous <Continuous>  dextrose 50% Injectable 12.5 Gram(s) IV Push once  dextrose 50% Injectable 25 Gram(s) IV Push once  dextrose 50% Injectable 25 Gram(s) IV Push once  enoxaparin Injectable 40 milliGRAM(s) SubCutaneous daily  famotidine    Tablet 20 milliGRAM(s) Oral two times a day  insulin lispro (HumaLOG) corrective regimen sliding scale   SubCutaneous every 6 hours  tamsulosin 0.4 milliGRAM(s) Oral at bedtime    MEDICATIONS  (PRN):  dextrose 40% Gel 15 Gram(s) Oral once PRN Blood Glucose LESS THAN 70 milliGRAM(s)/deciliter  glucagon  Injectable 1 milliGRAM(s) IntraMuscular once PRN Glucose LESS THAN 70 milligrams/deciliter  HYDROmorphone  Injectable 0.5 milliGRAM(s) IV Push every 4 hours PRN Moderate Pain (4 - 6)      Allergies    No Known Allergies    Intolerances      Review of Systems:    ALL OTHER SYSTEMS REVIEWED AND NEGATIVE        PHYSICAL EXAM:  VITALS: T(C): 36.4 (02-16-19 @ 08:09)  T(F): 97.6 (02-16-19 @ 08:09), Max: 99.7 (02-15-19 @ 19:50)  HR: 92 (02-16-19 @ 08:09) (72 - 96)  BP: 105/64 (02-16-19 @ 08:09) (88/50 - 131/67)  RR:  (9 - 20)  SpO2:  (93% - 100%)  Wt(kg): --  GENERAL: NAD, well-groomed,   EYES: No proptosis, no lid lag, anicteric  PSYCH: Alert and oriented x 3, normal affect, normal mood      CAPILLARY BLOOD GLUCOSE      POCT Blood Glucose.: 189 mg/dL (16 Feb 2019 12:14)  POCT Blood Glucose.: 213 mg/dL (16 Feb 2019 06:36)  POCT Blood Glucose.: 250 mg/dL (16 Feb 2019 00:31)      02-16    137  |  99  |  5<L>  ----------------------------<  205<H>  3.7   |  20<L>  |  0.64    EGFR if : 106  EGFR if non : 91    Ca    8.5      02-16  Mg     1.8     02-16  Phos  2.7     02-16    TPro  5.0<L>  /  Alb  2.5<L>  /  TBili  0.3  /  DBili  x   /  AST  7   /  ALT  6   /  AlkPhos  62  02-14          Thyroid Function Tests:      Hemoglobin A1C, Whole Blood: 6.2 % <H> [4.0 - 5.6] (01-31-19 @ 11:05)

## 2019-02-16 NOTE — PROGRESS NOTE ADULT - SUBJECTIVE AND OBJECTIVE BOX
GENERAL SURGERY DAILY PROGRESS NOTE:     Subjective:  Pt seen and examined. Pt w/ some sundowning overnight threatening to pull carson, however slept the majority of the night. Pain well controlled. Denies n/v/f/c. On cld, pulling carson today.     Objective:  NAD, awake and alert  Respirations nonlabored  Abdomen soft, mild tendereness, nondistended, incision site c/d/i  No guarding or rebound tenderness      MEDICATIONS  (STANDING):  acetaminophen  IVPB .. 1000 milliGRAM(s) IV Intermittent once  acetaminophen  IVPB .. 1000 milliGRAM(s) IV Intermittent once  ATENolol  Tablet 50 milliGRAM(s) Oral daily  dextrose 5% + sodium chloride 0.45% with potassium chloride 20 mEq/L 1000 milliLiter(s) (100 mL/Hr) IV Continuous <Continuous>  dextrose 5%. 1000 milliLiter(s) (50 mL/Hr) IV Continuous <Continuous>  dextrose 50% Injectable 12.5 Gram(s) IV Push once  dextrose 50% Injectable 25 Gram(s) IV Push once  dextrose 50% Injectable 25 Gram(s) IV Push once  enoxaparin Injectable 40 milliGRAM(s) SubCutaneous daily  famotidine    Tablet 20 milliGRAM(s) Oral two times a day  insulin lispro (HumaLOG) corrective regimen sliding scale   SubCutaneous every 6 hours  tamsulosin 0.4 milliGRAM(s) Oral at bedtime    MEDICATIONS  (PRN):  dextrose 40% Gel 15 Gram(s) Oral once PRN Blood Glucose LESS THAN 70 milliGRAM(s)/deciliter  glucagon  Injectable 1 milliGRAM(s) IntraMuscular once PRN Glucose LESS THAN 70 milligrams/deciliter  HYDROmorphone  Injectable 0.5 milliGRAM(s) IV Push every 4 hours PRN Moderate Pain (4 - 6)      Vital Signs Last 24 Hrs  T(C): 36.4 (16 Feb 2019 08:09), Max: 37.6 (15 Feb 2019 19:50)  T(F): 97.6 (16 Feb 2019 08:09), Max: 99.7 (15 Feb 2019 19:50)  HR: 92 (16 Feb 2019 08:09) (72 - 100)  BP: 105/64 (16 Feb 2019 08:09) (88/50 - 131/67)  BP(mean): 79 (15 Feb 2019 23:00) (50 - 83)  RR: 18 (16 Feb 2019 08:09) (9 - 20)  SpO2: 95% (16 Feb 2019 08:09) (93% - 100%)    I&O's Detail    15 Feb 2019 07:01  -  16 Feb 2019 07:00  --------------------------------------------------------  IN:    lactated ringers.: 1100 mL  Total IN: 1100 mL    OUT:    Colostomy: 25 mL    Indwelling Catheter - Urethral: 495 mL  Total OUT: 520 mL    Total NET: 580 mL          Daily Height in cm: 170.2 (15 Feb 2019 12:55)    Daily     LABS:                        13.3   9.79  )-----------( 365      ( 16 Feb 2019 06:35 )             42.1     02-16    137  |  99  |  5<L>  ----------------------------<  205<H>  3.7   |  20<L>  |  0.64    Ca    8.5      16 Feb 2019 06:35  Phos  2.7     02-16  Mg     1.8     02-16      PT/INR - ( 15 Feb 2019 06:15 )   PT: 14.6 SEC;   INR: 1.30          PTT - ( 15 Feb 2019 06:15 )  PTT:29.5 SEC      RADIOLOGY & ADDITIONAL STUDIES: GENERAL SURGERY DAILY PROGRESS NOTE:     Subjective:  Pt seen and examined. Pt w/ some sundowning overnight threatening to pull carson, however slept the majority of the night. Pain well controlled. Denies n/v/f/c. On cld, d/cing carson today after pt receives his flomax.     Objective:  NAD, awake and alert  Respirations nonlabored  Abdomen soft, mild tendereness, nondistended, incision site c/d/i  No guarding or rebound tenderness      MEDICATIONS  (STANDING):  acetaminophen  IVPB .. 1000 milliGRAM(s) IV Intermittent once  acetaminophen  IVPB .. 1000 milliGRAM(s) IV Intermittent once  ATENolol  Tablet 50 milliGRAM(s) Oral daily  dextrose 5% + sodium chloride 0.45% with potassium chloride 20 mEq/L 1000 milliLiter(s) (100 mL/Hr) IV Continuous <Continuous>  dextrose 5%. 1000 milliLiter(s) (50 mL/Hr) IV Continuous <Continuous>  dextrose 50% Injectable 12.5 Gram(s) IV Push once  dextrose 50% Injectable 25 Gram(s) IV Push once  dextrose 50% Injectable 25 Gram(s) IV Push once  enoxaparin Injectable 40 milliGRAM(s) SubCutaneous daily  famotidine    Tablet 20 milliGRAM(s) Oral two times a day  insulin lispro (HumaLOG) corrective regimen sliding scale   SubCutaneous every 6 hours  tamsulosin 0.4 milliGRAM(s) Oral at bedtime    MEDICATIONS  (PRN):  dextrose 40% Gel 15 Gram(s) Oral once PRN Blood Glucose LESS THAN 70 milliGRAM(s)/deciliter  glucagon  Injectable 1 milliGRAM(s) IntraMuscular once PRN Glucose LESS THAN 70 milligrams/deciliter  HYDROmorphone  Injectable 0.5 milliGRAM(s) IV Push every 4 hours PRN Moderate Pain (4 - 6)      Vital Signs Last 24 Hrs  T(C): 36.4 (16 Feb 2019 08:09), Max: 37.6 (15 Feb 2019 19:50)  T(F): 97.6 (16 Feb 2019 08:09), Max: 99.7 (15 Feb 2019 19:50)  HR: 92 (16 Feb 2019 08:09) (72 - 100)  BP: 105/64 (16 Feb 2019 08:09) (88/50 - 131/67)  BP(mean): 79 (15 Feb 2019 23:00) (50 - 83)  RR: 18 (16 Feb 2019 08:09) (9 - 20)  SpO2: 95% (16 Feb 2019 08:09) (93% - 100%)    I&O's Detail    15 Feb 2019 07:01  -  16 Feb 2019 07:00  --------------------------------------------------------  IN:    lactated ringers.: 1100 mL  Total IN: 1100 mL    OUT:    Colostomy: 25 mL    Indwelling Catheter - Urethral: 495 mL  Total OUT: 520 mL    Total NET: 580 mL          Daily Height in cm: 170.2 (15 Feb 2019 12:55)    Daily     LABS:                        13.3   9.79  )-----------( 365      ( 16 Feb 2019 06:35 )             42.1     02-16    137  |  99  |  5<L>  ----------------------------<  205<H>  3.7   |  20<L>  |  0.64    Ca    8.5      16 Feb 2019 06:35  Phos  2.7     02-16  Mg     1.8     02-16      PT/INR - ( 15 Feb 2019 06:15 )   PT: 14.6 SEC;   INR: 1.30          PTT - ( 15 Feb 2019 06:15 )  PTT:29.5 SEC      RADIOLOGY & ADDITIONAL STUDIES:

## 2019-02-16 NOTE — PROGRESS NOTE ADULT - PROBLEM SELECTOR PLAN 1
Target  - 180mg/dl   c/w low dose humalog correctional scale for now with meals and q6hrs when NPO.  -F/U c-peptide- will help in determining dc plan after sx.- possible orals vs. basal +orals.  -Goal A1C 7.5-8%.

## 2019-02-16 NOTE — PROGRESS NOTE ADULT - SUBJECTIVE AND OBJECTIVE BOX
CHIEF COMPLAINT:Patient is a 83y old  Male who presents with a chief complaint of hypoglycemia (15 Feb 2019 14:47)    	        PAST MEDICAL & SURGICAL HISTORY:  Lymphoma  Benign neoplasm of colon  Prostate cancer  Stented coronary artery: 2006  MI (myocardial infarction)  CAD (coronary artery disease)  HLD (hyperlipidemia)  GERD (gastroesophageal reflux disease)  Benign prostatic hypertrophy  Diabetes: Type II  HTN (hypertension)  CAD (coronary artery disease): 1 cardiac stent 2006 was on plavix, currently pt is not on aspirin  History of prostate surgery: 2007 seed implants          REVIEW OF SYSTEMS:    EYES: No eye pain, visual disturbances, or discharge  NECK: No pain or stiffness  RESPIRATORY: No cough, wheezing, chills or hemoptysis; No Shortness of Breath  CARDIOVASCULAR: No chest pain, palpitations, passing out, dizziness, or leg swelling  GASTROINTESTINAL: some abd discomfort No nausea, vomiting, or hematemesis; No diarrhea or constipation. No melena or hematochezia.  GENITOURINARY: No dysuria, frequency, hematuria, or incontinence  NEUROLOGICAL: No headaches,  MUSCULOSKELETAL: No joint pain or swelling; No muscle, back, or extremity pain    Medications:  MEDICATIONS  (STANDING):  acetaminophen  IVPB .. 1000 milliGRAM(s) IV Intermittent once  acetaminophen  IVPB .. 1000 milliGRAM(s) IV Intermittent once  ATENolol  Tablet 50 milliGRAM(s) Oral daily  dextrose 5%. 1000 milliLiter(s) (50 mL/Hr) IV Continuous <Continuous>  dextrose 50% Injectable 12.5 Gram(s) IV Push once  dextrose 50% Injectable 25 Gram(s) IV Push once  dextrose 50% Injectable 25 Gram(s) IV Push once  enoxaparin Injectable 40 milliGRAM(s) SubCutaneous daily  famotidine    Tablet 20 milliGRAM(s) Oral two times a day  insulin lispro (HumaLOG) corrective regimen sliding scale   SubCutaneous every 6 hours  lactated ringers. 1000 milliLiter(s) (100 mL/Hr) IV Continuous <Continuous>  magnesium sulfate  IVPB 2 Gram(s) IV Intermittent once  potassium phosphate IVPB 15 milliMole(s) IV Intermittent once  tamsulosin 0.4 milliGRAM(s) Oral at bedtime    MEDICATIONS  (PRN):  dextrose 40% Gel 15 Gram(s) Oral once PRN Blood Glucose LESS THAN 70 milliGRAM(s)/deciliter  glucagon  Injectable 1 milliGRAM(s) IntraMuscular once PRN Glucose LESS THAN 70 milligrams/deciliter  HYDROmorphone  Injectable 0.5 milliGRAM(s) IV Push every 4 hours PRN Moderate Pain (4 - 6)    	    PHYSICAL EXAM:  T(C): 36.4 (02-16-19 @ 08:09), Max: 37.6 (02-15-19 @ 19:50)  HR: 92 (02-16-19 @ 08:09) (72 - 100)  BP: 105/64 (02-16-19 @ 08:09) (88/50 - 131/67)  RR: 18 (02-16-19 @ 08:09) (9 - 20)  SpO2: 95% (02-16-19 @ 08:09) (93% - 100%)  Wt(kg): --  I&O's Summary    15 Feb 2019 07:01  -  16 Feb 2019 07:00  --------------------------------------------------------  IN: 1100 mL / OUT: 520 mL / NET: 580 mL        Appearance: Normal	  HEENT:   Normal oral mucosa, PERRL, EOMI	  Lymphatic: No lymphadenopathy  Cardiovascular: Normal S1 S2, No JVD, No murmurs, No edema  Respiratory: Lungs clear to auscultation	  Psychiatry: A & O x 3, Mood & affect appropriate  Gastrointestinal:  Soft,  mildly tender  ostomt /  Skin: No rashes, No ecchymoses, No cyanosis	  Neurologic: Non-focal  Extremities: Normal range of motion, No clubbing, cyanosis or edema  Vascular: Peripheral pulses palpable 2+ bilaterally    TELEMETRY: 	    ECG:  	  RADIOLOGY:  OTHER: 	  	  LABS:	 	    CARDIAC MARKERS:                                13.3   9.79  )-----------( 365      ( 16 Feb 2019 06:35 )             42.1     02-16    137  |  99  |  5<L>  ----------------------------<  205<H>  3.7   |  20<L>  |  0.64    Ca    8.5      16 Feb 2019 06:35  Phos  2.7     02-16  Mg     1.8     02-16      proBNP:   Lipid Profile:   HgA1c:   TSH:

## 2019-02-16 NOTE — PROGRESS NOTE ADULT - ASSESSMENT
83M pmhx BPH, prostate ca, CAD, MI s/p stents, DM, GERD, HLD, HTN, and recently diagnosed colon mass who was scheduled for resection today but had an episode of hypoglycemia to 29 prior to presentation, likely secondary to excessive insulin administration. Now s/p ex lap w/ small bowel resection, right colectomy, and end ileostomy pod1.     - maintenance fluids  - cld  - pull carson, monitor uop  - f/u labs  - C/w home meds except Lasix   - OOB/PT  - ostomy teaching  - DVT ppx    Surgery [id number] 83M pmhx BPH, prostate ca, CAD, MI s/p stents, DM, GERD, HLD, HTN, and recently diagnosed colon mass who was scheduled for resection today but had an episode of hypoglycemia to 29 prior to presentation, likely secondary to excessive insulin administration. Now s/p ex lap w/ small bowel resection, right colectomy, and end ileostomy pod1.     - maintenance fluids  - cld  - pull carson, monitor uop  - f/u labs  - C/w home meds except Lasix   - OOB/PT  - ostomy teaching  - DVT ppx    Discussed w/ Dr. Botello  Surgery [id number]

## 2019-02-17 LAB
ANION GAP SERPL CALC-SCNC: 13 MMO/L — SIGNIFICANT CHANGE UP (ref 7–14)
BUN SERPL-MCNC: 5 MG/DL — LOW (ref 7–23)
CALCIUM SERPL-MCNC: 8.1 MG/DL — LOW (ref 8.4–10.5)
CHLORIDE SERPL-SCNC: 103 MMOL/L — SIGNIFICANT CHANGE UP (ref 98–107)
CO2 SERPL-SCNC: 21 MMOL/L — LOW (ref 22–31)
CREAT SERPL-MCNC: 0.63 MG/DL — SIGNIFICANT CHANGE UP (ref 0.5–1.3)
GLUCOSE BLDC GLUCOMTR-MCNC: 179 MG/DL — HIGH (ref 70–99)
GLUCOSE BLDC GLUCOMTR-MCNC: 185 MG/DL — HIGH (ref 70–99)
GLUCOSE BLDC GLUCOMTR-MCNC: 206 MG/DL — HIGH (ref 70–99)
GLUCOSE BLDC GLUCOMTR-MCNC: 65 MG/DL — LOW (ref 70–99)
GLUCOSE BLDC GLUCOMTR-MCNC: 67 MG/DL — LOW (ref 70–99)
GLUCOSE SERPL-MCNC: 175 MG/DL — HIGH (ref 70–99)
HCT VFR BLD CALC: 32.7 % — LOW (ref 39–50)
HCT VFR BLD CALC: 33.5 % — LOW (ref 39–50)
HGB BLD-MCNC: 10.5 G/DL — LOW (ref 13–17)
HGB BLD-MCNC: 10.6 G/DL — LOW (ref 13–17)
MAGNESIUM SERPL-MCNC: 2 MG/DL — SIGNIFICANT CHANGE UP (ref 1.6–2.6)
MCHC RBC-ENTMCNC: 25.9 PG — LOW (ref 27–34)
MCHC RBC-ENTMCNC: 26.3 PG — LOW (ref 27–34)
MCHC RBC-ENTMCNC: 31.3 % — LOW (ref 32–36)
MCHC RBC-ENTMCNC: 32.4 % — SIGNIFICANT CHANGE UP (ref 32–36)
MCV RBC AUTO: 81.1 FL — SIGNIFICANT CHANGE UP (ref 80–100)
MCV RBC AUTO: 82.5 FL — SIGNIFICANT CHANGE UP (ref 80–100)
NRBC # FLD: 0 K/UL — LOW (ref 25–125)
NRBC # FLD: 0 K/UL — LOW (ref 25–125)
PHOSPHATE SERPL-MCNC: 2.9 MG/DL — SIGNIFICANT CHANGE UP (ref 2.5–4.5)
PLATELET # BLD AUTO: 314 K/UL — SIGNIFICANT CHANGE UP (ref 150–400)
PLATELET # BLD AUTO: 352 K/UL — SIGNIFICANT CHANGE UP (ref 150–400)
PMV BLD: 8.7 FL — SIGNIFICANT CHANGE UP (ref 7–13)
PMV BLD: 8.7 FL — SIGNIFICANT CHANGE UP (ref 7–13)
POTASSIUM SERPL-MCNC: 4.2 MMOL/L — SIGNIFICANT CHANGE UP (ref 3.5–5.3)
POTASSIUM SERPL-SCNC: 4.2 MMOL/L — SIGNIFICANT CHANGE UP (ref 3.5–5.3)
RBC # BLD: 4.03 M/UL — LOW (ref 4.2–5.8)
RBC # BLD: 4.06 M/UL — LOW (ref 4.2–5.8)
RBC # FLD: 17.3 % — HIGH (ref 10.3–14.5)
RBC # FLD: 17.7 % — HIGH (ref 10.3–14.5)
SODIUM SERPL-SCNC: 137 MMOL/L — SIGNIFICANT CHANGE UP (ref 135–145)
WBC # BLD: 8.28 K/UL — SIGNIFICANT CHANGE UP (ref 3.8–10.5)
WBC # BLD: 9.04 K/UL — SIGNIFICANT CHANGE UP (ref 3.8–10.5)
WBC # FLD AUTO: 8.28 K/UL — SIGNIFICANT CHANGE UP (ref 3.8–10.5)
WBC # FLD AUTO: 9.04 K/UL — SIGNIFICANT CHANGE UP (ref 3.8–10.5)

## 2019-02-17 RX ORDER — ACETAMINOPHEN 500 MG
1000 TABLET ORAL ONCE
Qty: 0 | Refills: 0 | Status: COMPLETED | OUTPATIENT
Start: 2019-02-17 | End: 2019-02-17

## 2019-02-17 RX ORDER — ACETAMINOPHEN 500 MG
1000 TABLET ORAL ONCE
Qty: 0 | Refills: 0 | Status: COMPLETED | OUTPATIENT
Start: 2019-02-18 | End: 2019-02-18

## 2019-02-17 RX ADMIN — ENOXAPARIN SODIUM 40 MILLIGRAM(S): 100 INJECTION SUBCUTANEOUS at 13:24

## 2019-02-17 RX ADMIN — Medication 1000 MILLIGRAM(S): at 21:35

## 2019-02-17 RX ADMIN — Medication 1: at 05:25

## 2019-02-17 RX ADMIN — ATENOLOL 50 MILLIGRAM(S): 25 TABLET ORAL at 05:25

## 2019-02-17 RX ADMIN — TAMSULOSIN HYDROCHLORIDE 0.4 MILLIGRAM(S): 0.4 CAPSULE ORAL at 21:04

## 2019-02-17 RX ADMIN — Medication 1000 MILLIGRAM(S): at 17:18

## 2019-02-17 RX ADMIN — FAMOTIDINE 20 MILLIGRAM(S): 10 INJECTION INTRAVENOUS at 05:25

## 2019-02-17 RX ADMIN — Medication 400 MILLIGRAM(S): at 02:22

## 2019-02-17 RX ADMIN — Medication 1000 MILLIGRAM(S): at 19:15

## 2019-02-17 RX ADMIN — FAMOTIDINE 20 MILLIGRAM(S): 10 INJECTION INTRAVENOUS at 18:42

## 2019-02-17 RX ADMIN — Medication 2: at 18:42

## 2019-02-17 RX ADMIN — Medication 1: at 13:24

## 2019-02-17 RX ADMIN — Medication 400 MILLIGRAM(S): at 21:04

## 2019-02-17 RX ADMIN — Medication 400 MILLIGRAM(S): at 13:55

## 2019-02-17 RX ADMIN — Medication 1000 MILLIGRAM(S): at 02:52

## 2019-02-17 NOTE — PHYSICAL THERAPY INITIAL EVALUATION ADULT - DISCHARGE DISPOSITION, PT EVAL
rehabilitation facility
Patient will benefit from inpatient restorative rehab./rehabilitation facility

## 2019-02-17 NOTE — PHYSICAL THERAPY INITIAL EVALUATION ADULT - PERTINENT HX OF CURRENT PROBLEM, REHAB EVAL
Scheduled for colon mass resection, which was not performed as patient's BS = 29, likely due to excessive insulin administration, as per H&P.
Patient is an 83 year old male admitted to [facility] on 2/13 with symptomatic hypoglycemia. PMH includes: BPH, prostate cancer, CAD, MI s/p stents, DM, GERD, HLD, HTN. Pt now s/p

## 2019-02-17 NOTE — PHYSICAL THERAPY INITIAL EVALUATION ADULT - PATIENT PROFILE REVIEW, REHAB EVAL
yes/PT orders received: ambulate with assistance. Consult with RN Rolando CYR , pt OK to participate in PT evaluation.

## 2019-02-17 NOTE — PROGRESS NOTE ADULT - ASSESSMENT
82 y/o male w / pmhx BPH, prostate ca, CAD, MI s/p stents, DM, GERD, HLD, HTN, and recently diagnosed colon mass who was scheduled for resection today but had an episode of hypoglycemia to 29 this morning, likely secondary to excessive insulin administration.and sx cancelled ans sx done yesterday       s/p colectomy/ileostomy  resolved hypoglycemia  fsg riss  no long acting insulin for now until po diet  mild iv flluids  f/u bmp     mild confusion likely related from hospital a.d underlying dementa       cad  cont meds      mild hyponatremia /improved  f/u lytes  fluid restrict       dvt proph

## 2019-02-17 NOTE — PHYSICAL THERAPY INITIAL EVALUATION ADULT - MANUAL MUSCLE TESTING RESULTS, REHAB EVAL
Except bilateral Lower Extremity 3-/5/no strength deficits were identified
grossly assessed due to/bilateral UEs & LEs grossly at least 3+/5, not formally tested secondary to recent surgery

## 2019-02-17 NOTE — PHYSICAL THERAPY INITIAL EVALUATION ADULT - AMBULATION SKILLS, REHAB EVAL
Ambulates very short distances within house while holding on to something; WC for mobility outside home
needs device and assist

## 2019-02-17 NOTE — PROGRESS NOTE ADULT - SUBJECTIVE AND OBJECTIVE BOX
CHIEF COMPLAINT:Patient is a 83y old  Male who presents with a chief complaint of hypoglycemia (15 Feb 2019 14:47)    	        PAST MEDICAL & SURGICAL HISTORY:  Lymphoma  Benign neoplasm of colon  Prostate cancer  Stented coronary artery: 2006  MI (myocardial infarction)  CAD (coronary artery disease)  HLD (hyperlipidemia)  GERD (gastroesophageal reflux disease)  Benign prostatic hypertrophy  Diabetes: Type II  HTN (hypertension)  CAD (coronary artery disease): 1 cardiac stent 2006 was on plavix, currently pt is not on aspirin  History of prostate surgery: 2007 seed implants          REVIEW OF SYSTEMS:  CONSTITUTIONAL: No fever, weight loss, or fatigue  EYES: No eye pain, visual disturbances, or discharge  NECK: No pain or stiffness  RESPIRATORY: No cough, wheezing, chills or hemoptysis; No Shortness of Breath  CARDIOVASCULAR: No chest pain, palpitations, passing out, dizziness, or leg swelling  GASTROINTESTINAL: No abdominal or epigastric pain. No nausea, vomiting, or hematemesis; No diarrhea or constipation. No melena or hematochezia.  GENITOURINARY: No dysuria, frequency, hematuria, or incontinence  NEUROLOGICAL: No headaches,      Medications:  MEDICATIONS  (STANDING):  ATENolol  Tablet 50 milliGRAM(s) Oral daily  dextrose 5% + sodium chloride 0.45% with potassium chloride 20 mEq/L 1000 milliLiter(s) (100 mL/Hr) IV Continuous <Continuous>  dextrose 5%. 1000 milliLiter(s) (50 mL/Hr) IV Continuous <Continuous>  dextrose 50% Injectable 12.5 Gram(s) IV Push once  dextrose 50% Injectable 25 Gram(s) IV Push once  dextrose 50% Injectable 25 Gram(s) IV Push once  enoxaparin Injectable 40 milliGRAM(s) SubCutaneous daily  famotidine    Tablet 20 milliGRAM(s) Oral two times a day  insulin lispro (HumaLOG) corrective regimen sliding scale   SubCutaneous every 6 hours  tamsulosin 0.4 milliGRAM(s) Oral at bedtime    MEDICATIONS  (PRN):  dextrose 40% Gel 15 Gram(s) Oral once PRN Blood Glucose LESS THAN 70 milliGRAM(s)/deciliter  glucagon  Injectable 1 milliGRAM(s) IntraMuscular once PRN Glucose LESS THAN 70 milligrams/deciliter  HYDROmorphone  Injectable 0.5 milliGRAM(s) IV Push every 4 hours PRN Moderate Pain (4 - 6)    	    PHYSICAL EXAM:  T(C): 36.3 (02-17-19 @ 04:58), Max: 36.6 (02-16-19 @ 12:00)  HR: 82 (02-17-19 @ 04:58) (81 - 93)  BP: 112/64 (02-17-19 @ 04:58) (99/52 - 112/64)  RR: 17 (02-17-19 @ 04:58) (17 - 18)  SpO2: 100% (02-17-19 @ 04:58) (95% - 100%)  Wt(kg): --  I&O's Summary    16 Feb 2019 07:01  -  17 Feb 2019 07:00  --------------------------------------------------------  IN: 1000 mL / OUT: 1270 mL / NET: -270 mL        Appearance: Normal	  HEENT:   Normal oral mucosa, PERRL, EOMI	  Lymphatic: No lymphadenopathy  Cardiovascular: Normal S1 S2, No JVD, No murmurs, No edema  Respiratory: Lungs clear to auscultation	    Gastrointestinal:  Soft, mildly-tender, + BS	ostomy   Skin: No rashes, No ecchymoses, No cyanosis	  Neurologic: Non-focal  Extremities: Normal range of motion, No clubbing, cyanosis or edema  Vascular: Peripheral pulses palpable 2+ bilaterally    TELEMETRY: 	    ECG:  	  RADIOLOGY:  OTHER: 	  	  LABS:	 	    CARDIAC MARKERS:                                10.6   8.28  )-----------( 314      ( 17 Feb 2019 06:55 )             32.7     02-16    137  |  99  |  5<L>  ----------------------------<  205<H>  3.7   |  20<L>  |  0.64    Ca    8.5      16 Feb 2019 06:35  Phos  2.7     02-16  Mg     1.8     02-16      proBNP:   Lipid Profile:   HgA1c:   TSH:

## 2019-02-17 NOTE — PHYSICAL THERAPY INITIAL EVALUATION ADULT - PLANNED THERAPY INTERVENTIONS, PT EVAL
gait training/transfer training/strengthening/bed mobility training
transfer training/bed mobility training/strengthening/gait training

## 2019-02-17 NOTE — PHYSICAL THERAPY INITIAL EVALUATION ADULT - ADDITIONAL COMMENTS
Patient lives with his spouse and child in a private house. Pt ambulates very short distances within the house while holding on to something and uses wheelchair for outside. Patient required assistance with ADLs.    Patient was left sitting in chair as found, all lines/tubes intact and call mcwilliams within reach, RADAH lim

## 2019-02-17 NOTE — PROGRESS NOTE ADULT - SUBJECTIVE AND OBJECTIVE BOX
GENERAL SURGERY DAILY PROGRESS NOTE:     Subjective:  Pt seen and examined. No acute events overnight. Pain well controlled. Denies n/v/f/c. Afebrile. Pt reports he feels well this am. Less confused.     Objective:  NAD, awake and alert  Respirations nonlabored  Abdomen soft, nontender, nondistended, ostomy w/ serous liquid w/o air, incision site c/d/i  No guarding or rebound tenderness      MEDICATIONS  (STANDING):  ATENolol  Tablet 50 milliGRAM(s) Oral daily  dextrose 5% + sodium chloride 0.45% with potassium chloride 20 mEq/L 1000 milliLiter(s) (100 mL/Hr) IV Continuous <Continuous>  dextrose 5%. 1000 milliLiter(s) (50 mL/Hr) IV Continuous <Continuous>  dextrose 50% Injectable 12.5 Gram(s) IV Push once  dextrose 50% Injectable 25 Gram(s) IV Push once  dextrose 50% Injectable 25 Gram(s) IV Push once  enoxaparin Injectable 40 milliGRAM(s) SubCutaneous daily  famotidine    Tablet 20 milliGRAM(s) Oral two times a day  insulin lispro (HumaLOG) corrective regimen sliding scale   SubCutaneous every 6 hours  tamsulosin 0.4 milliGRAM(s) Oral at bedtime    MEDICATIONS  (PRN):  dextrose 40% Gel 15 Gram(s) Oral once PRN Blood Glucose LESS THAN 70 milliGRAM(s)/deciliter  glucagon  Injectable 1 milliGRAM(s) IntraMuscular once PRN Glucose LESS THAN 70 milligrams/deciliter  HYDROmorphone  Injectable 0.5 milliGRAM(s) IV Push every 4 hours PRN Moderate Pain (4 - 6)      Vital Signs Last 24 Hrs  T(C): 36.3 (2019 08:48), Max: 36.6 (2019 12:00)  T(F): 97.4 (2019 08:48), Max: 97.8 (2019 12:00)  HR: 82 (2019 08:48) (81 - 93)  BP: 108/64 (2019 08:48) (99/52 - 112/64)  BP(mean): --  RR: 18 (2019 08:48) (17 - 18)  SpO2: 99% (2019 08:48) (96% - 100%)    I&O's Detail    2019 07:01  -  2019 07:00  --------------------------------------------------------  IN:    dextrose 5% + sodium chloride 0.45% with potassium chloride 20 mEq/L: 900 mL    IV PiggyBack: 100 mL  Total IN: 1000 mL    OUT:    Colostomy: 20 mL    Indwelling Catheter - Urethral: 1250 mL  Total OUT: 1270 mL    Total NET: -270 mL          Daily     Daily     LABS:                        10.6   8.28  )-----------( 314      ( 2019 06:55 )             32.7         137  |  103  |  5<L>  ----------------------------<  175<H>  4.2   |  21<L>  |  0.63    Ca    8.1<L>      2019 06:55  Phos  2.9       Mg     2.0             Urinalysis Basic - ( 2019 12:00 )    Color: YELLOW / Appearance: Lt TURBID / S.034 / pH: 6.0  Gluc: 30 / Ketone: TRACE  / Bili: NEGATIVE / Urobili: NORMAL   Blood: NEGATIVE / Protein: 50 / Nitrite: NEGATIVE   Leuk Esterase: NEGATIVE / RBC: 3-5 / WBC 6-10   Sq Epi: MODERATE / Non Sq Epi: x / Bacteria: NEGATIVE        RADIOLOGY & ADDITIONAL STUDIES:

## 2019-02-17 NOTE — PROGRESS NOTE ADULT - ASSESSMENT
83M pmhx BPH, prostate ca, CAD, MI s/p stents, DM, GERD, HLD, HTN, and recently diagnosed colon mass who was scheduled for resection today but had an episode of hypoglycemia to 29 prior to presentation, likely secondary to excessive insulin administration. Now s/p ex lap w/ small bowel resection, right colectomy, and end ileostomy pod2.     - maintenance fluids  - sips  - d/c carson today  - f/u labs  - C/w home meds except Lasix   - OOB/PT  - ostomy teaching  - DVT ppx    Surgery [id number] 83M pmhx BPH, prostate ca, CAD, MI s/p stents, DM, GERD, HLD, HTN, and recently diagnosed colon mass who was scheduled for resection today but had an episode of hypoglycemia to 29 prior to presentation, likely secondary to excessive insulin administration. Now s/p ex lap w/ small bowel resection, right colectomy, and end ileostomy pod2.     - maintenance fluids  - monitor ostomy function  - sips  - d/c carson today  - f/u labs  - C/w home meds except Lasix   - OOB/PT  - ostomy teaching  - DVT ppx    Surgery [id number]

## 2019-02-17 NOTE — PHYSICAL THERAPY INITIAL EVALUATION ADULT - GENERAL OBSERVATIONS, REHAB EVAL
Received and left supine in bed with son and daughter present and call bell in reach.
Patient was received sitting in chair in NAD, son and 1:1 at bedside.

## 2019-02-18 LAB
ANION GAP SERPL CALC-SCNC: 11 MMO/L — SIGNIFICANT CHANGE UP (ref 7–14)
BUN SERPL-MCNC: 5 MG/DL — LOW (ref 7–23)
CALCIUM SERPL-MCNC: 8.2 MG/DL — LOW (ref 8.4–10.5)
CHLORIDE SERPL-SCNC: 102 MMOL/L — SIGNIFICANT CHANGE UP (ref 98–107)
CO2 SERPL-SCNC: 23 MMOL/L — SIGNIFICANT CHANGE UP (ref 22–31)
CREAT SERPL-MCNC: 0.62 MG/DL — SIGNIFICANT CHANGE UP (ref 0.5–1.3)
GLUCOSE BLDC GLUCOMTR-MCNC: 148 MG/DL — HIGH (ref 70–99)
GLUCOSE BLDC GLUCOMTR-MCNC: 150 MG/DL — HIGH (ref 70–99)
GLUCOSE BLDC GLUCOMTR-MCNC: 153 MG/DL — HIGH (ref 70–99)
GLUCOSE BLDC GLUCOMTR-MCNC: 154 MG/DL — HIGH (ref 70–99)
GLUCOSE BLDC GLUCOMTR-MCNC: 163 MG/DL — HIGH (ref 70–99)
GLUCOSE BLDC GLUCOMTR-MCNC: 172 MG/DL — HIGH (ref 70–99)
GLUCOSE SERPL-MCNC: 134 MG/DL — HIGH (ref 70–99)
HCT VFR BLD CALC: 31.9 % — LOW (ref 39–50)
HGB BLD-MCNC: 10 G/DL — LOW (ref 13–17)
MAGNESIUM SERPL-MCNC: 2 MG/DL — SIGNIFICANT CHANGE UP (ref 1.6–2.6)
MCHC RBC-ENTMCNC: 26 PG — LOW (ref 27–34)
MCHC RBC-ENTMCNC: 31.3 % — LOW (ref 32–36)
MCV RBC AUTO: 83.1 FL — SIGNIFICANT CHANGE UP (ref 80–100)
NRBC # FLD: 0 K/UL — LOW (ref 25–125)
PHOSPHATE SERPL-MCNC: 2.4 MG/DL — LOW (ref 2.5–4.5)
PLATELET # BLD AUTO: 329 K/UL — SIGNIFICANT CHANGE UP (ref 150–400)
PMV BLD: 8.6 FL — SIGNIFICANT CHANGE UP (ref 7–13)
POTASSIUM SERPL-MCNC: 4.7 MMOL/L — SIGNIFICANT CHANGE UP (ref 3.5–5.3)
POTASSIUM SERPL-SCNC: 4.7 MMOL/L — SIGNIFICANT CHANGE UP (ref 3.5–5.3)
RBC # BLD: 3.84 M/UL — LOW (ref 4.2–5.8)
RBC # FLD: 18 % — HIGH (ref 10.3–14.5)
SODIUM SERPL-SCNC: 136 MMOL/L — SIGNIFICANT CHANGE UP (ref 135–145)
WBC # BLD: 7.66 K/UL — SIGNIFICANT CHANGE UP (ref 3.8–10.5)
WBC # FLD AUTO: 7.66 K/UL — SIGNIFICANT CHANGE UP (ref 3.8–10.5)

## 2019-02-18 PROCEDURE — 99232 SBSQ HOSP IP/OBS MODERATE 35: CPT

## 2019-02-18 RX ORDER — INSULIN LISPRO 100/ML
VIAL (ML) SUBCUTANEOUS AT BEDTIME
Qty: 0 | Refills: 0 | Status: DISCONTINUED | OUTPATIENT
Start: 2019-02-18 | End: 2019-02-20

## 2019-02-18 RX ORDER — FUROSEMIDE 40 MG
40 TABLET ORAL DAILY
Qty: 0 | Refills: 0 | Status: DISCONTINUED | OUTPATIENT
Start: 2019-02-18 | End: 2019-02-20

## 2019-02-18 RX ORDER — TAMSULOSIN HYDROCHLORIDE 0.4 MG/1
0.8 CAPSULE ORAL AT BEDTIME
Qty: 0 | Refills: 0 | Status: DISCONTINUED | OUTPATIENT
Start: 2019-02-18 | End: 2019-02-20

## 2019-02-18 RX ORDER — INSULIN LISPRO 100/ML
VIAL (ML) SUBCUTANEOUS
Qty: 0 | Refills: 0 | Status: DISCONTINUED | OUTPATIENT
Start: 2019-02-18 | End: 2019-02-20

## 2019-02-18 RX ORDER — INSULIN LISPRO 100/ML
VIAL (ML) SUBCUTANEOUS
Qty: 0 | Refills: 0 | Status: DISCONTINUED | OUTPATIENT
Start: 2019-02-18 | End: 2019-02-18

## 2019-02-18 RX ORDER — DEXTROSE 50 % IN WATER 50 %
50 SYRINGE (ML) INTRAVENOUS ONCE
Qty: 0 | Refills: 0 | Status: COMPLETED | OUTPATIENT
Start: 2019-02-18 | End: 2019-02-18

## 2019-02-18 RX ADMIN — Medication 50 MILLILITER(S): at 00:08

## 2019-02-18 RX ADMIN — Medication 1: at 13:11

## 2019-02-18 RX ADMIN — TAMSULOSIN HYDROCHLORIDE 0.8 MILLIGRAM(S): 0.4 CAPSULE ORAL at 22:15

## 2019-02-18 RX ADMIN — FAMOTIDINE 20 MILLIGRAM(S): 10 INJECTION INTRAVENOUS at 17:36

## 2019-02-18 RX ADMIN — Medication 1: at 17:36

## 2019-02-18 RX ADMIN — Medication 1000 MILLIGRAM(S): at 02:50

## 2019-02-18 RX ADMIN — Medication 400 MILLIGRAM(S): at 09:25

## 2019-02-18 RX ADMIN — FAMOTIDINE 20 MILLIGRAM(S): 10 INJECTION INTRAVENOUS at 05:31

## 2019-02-18 RX ADMIN — Medication 400 MILLIGRAM(S): at 02:23

## 2019-02-18 RX ADMIN — ENOXAPARIN SODIUM 40 MILLIGRAM(S): 100 INJECTION SUBCUTANEOUS at 13:11

## 2019-02-18 RX ADMIN — Medication 1000 MILLIGRAM(S): at 10:00

## 2019-02-18 RX ADMIN — Medication 63.75 MILLIMOLE(S): at 09:58

## 2019-02-18 RX ADMIN — DEXTROSE MONOHYDRATE, SODIUM CHLORIDE, AND POTASSIUM CHLORIDE 100 MILLILITER(S): 50; .745; 4.5 INJECTION, SOLUTION INTRAVENOUS at 09:25

## 2019-02-18 NOTE — PROVIDER CONTACT NOTE (OTHER) - BACKGROUND
ex lap, R partial colectomy, ileostomy
pt admitted for hypoglycemia

## 2019-02-18 NOTE — PROVIDER CONTACT NOTE (OTHER) - SITUATION
pt agitated, refusing repeat v/s for rectal temp of 96 F
pt received from PACU, rectal temp 96 F
Pt blood sugar accucheck 67
pt has h/o dm2, was ordered for fs and sliding scale. order was d/diana after finger stick was done which resulted at 169- due for 1 unit humalog

## 2019-02-18 NOTE — PROGRESS NOTE ADULT - SUBJECTIVE AND OBJECTIVE BOX
CHIEF COMPLAINT:Patient is a 83y old  Male who presents with a chief complaint of hypoglycemia (15 Feb 2019 14:47)    	        PAST MEDICAL & SURGICAL HISTORY:  Lymphoma  Benign neoplasm of colon  Prostate cancer  Stented coronary artery: 2006  MI (myocardial infarction)  CAD (coronary artery disease)  HLD (hyperlipidemia)  GERD (gastroesophageal reflux disease)  Benign prostatic hypertrophy  Diabetes: Type II  HTN (hypertension)  CAD (coronary artery disease): 1 cardiac stent 2006 was on plavix, currently pt is not on aspirin  History of prostate surgery: 2007 seed implants          REVIEW OF SYSTEMS:  CONSTITUTIONAL:feels better   EYES: No eye pain, visual disturbances, or discharge  NECK: No pain or stiffness  RESPIRATORY: No cough, wheezing, chills or hemoptysis; No Shortness of Breath  CARDIOVASCULAR: No chest pain, palpitations, passing out, dizziness,  GASTROINTESTINAL: mild abd pain   . No nausea, vomiting, or hematemesis; No diarrhea or constipation. No melena or hematochezia.  GENITOURINARY: No dysuria, frequency, hematuria, or incontinence  NEUROLOGICAL: No headaches,   some confusion      Medications:  MEDICATIONS  (STANDING):  ATENolol  Tablet 50 milliGRAM(s) Oral daily  dextrose 5% + sodium chloride 0.45% with potassium chloride 20 mEq/L 1000 milliLiter(s) (100 mL/Hr) IV Continuous <Continuous>  dextrose 5%. 1000 milliLiter(s) (50 mL/Hr) IV Continuous <Continuous>  dextrose 50% Injectable 12.5 Gram(s) IV Push once  dextrose 50% Injectable 25 Gram(s) IV Push once  dextrose 50% Injectable 25 Gram(s) IV Push once  enoxaparin Injectable 40 milliGRAM(s) SubCutaneous daily  famotidine    Tablet 20 milliGRAM(s) Oral two times a day  insulin lispro (HumaLOG) corrective regimen sliding scale   SubCutaneous Before meals and at bedtime  sodium phosphate IVPB 15 milliMole(s) IV Intermittent once  tamsulosin 0.4 milliGRAM(s) Oral at bedtime    MEDICATIONS  (PRN):  dextrose 40% Gel 15 Gram(s) Oral once PRN Blood Glucose LESS THAN 70 milliGRAM(s)/deciliter  glucagon  Injectable 1 milliGRAM(s) IntraMuscular once PRN Glucose LESS THAN 70 milligrams/deciliter  HYDROmorphone  Injectable 0.5 milliGRAM(s) IV Push every 4 hours PRN Moderate Pain (4 - 6)    	    PHYSICAL EXAM:  T(C): 36.1 (02-18-19 @ 09:11), Max: 36.9 (02-17-19 @ 14:06)  HR: 66 (02-18-19 @ 09:11) (52 - 80)  BP: 117/61 (02-18-19 @ 09:11) (94/50 - 117/61)  RR: 17 (02-18-19 @ 09:11) (17 - 18)  SpO2: 100% (02-18-19 @ 09:11) (100% - 100%)  Wt(kg): --  I&O's Summary    17 Feb 2019 07:01  -  18 Feb 2019 07:00  --------------------------------------------------------  IN: 0 mL / OUT: 520 mL / NET: -520 mL    18 Feb 2019 07:01  -  18 Feb 2019 11:22  --------------------------------------------------------  IN: 0 mL / OUT: 300 mL / NET: -300 mL        Appearance: Normal	  HEENT:   Normal oral mucosa, PERRL, EOMI	  Lymphatic: No lymphadenopathy  Cardiovascular: Normal S1 S2, No JVD, No murmurs, No edema  Respiratory: Lungs clear to auscultation	  Gastrointestinal:  Soft, Non-tender, + BS	ostomy  Skin: No rashes, No ecchymoses, No cyanosis	  Neurologic: Non-focal  Extremities: Normal range of motion, No clubbing, cyanosis or edema  Vascular: Peripheral pulses palpable 2+ bilaterally    TELEMETRY: 	    ECG:  	  RADIOLOGY:  OTHER: 	  	  LABS:	 	    CARDIAC MARKERS:                                10.0   7.66  )-----------( 329      ( 18 Feb 2019 05:30 )             31.9     02-18    136  |  102  |  5<L>  ----------------------------<  134<H>  4.7   |  23  |  0.62    Ca    8.2<L>      18 Feb 2019 05:30  Phos  2.4     02-18  Mg     2.0     02-18      proBNP:   Lipid Profile:   HgA1c:   TSH:

## 2019-02-18 NOTE — PROGRESS NOTE ADULT - SUBJECTIVE AND OBJECTIVE BOX
Patient with hypoglycemia yesterday. He is on constant observation. States he did not eat dinner yesterday, not eating much overall    History:    MEDICATIONS  (STANDING):  ATENolol  Tablet 50 milliGRAM(s) Oral daily  dextrose 5% + sodium chloride 0.45% with potassium chloride 20 mEq/L 1000 milliLiter(s) (100 mL/Hr) IV Continuous <Continuous>  dextrose 5%. 1000 milliLiter(s) (50 mL/Hr) IV Continuous <Continuous>  dextrose 50% Injectable 12.5 Gram(s) IV Push once  dextrose 50% Injectable 25 Gram(s) IV Push once  dextrose 50% Injectable 25 Gram(s) IV Push once  enoxaparin Injectable 40 milliGRAM(s) SubCutaneous daily  famotidine    Tablet 20 milliGRAM(s) Oral two times a day  insulin lispro (HumaLOG) corrective regimen sliding scale   SubCutaneous Before meals and at bedtime  sodium phosphate IVPB 15 milliMole(s) IV Intermittent once  tamsulosin 0.4 milliGRAM(s) Oral at bedtime    MEDICATIONS  (PRN):  dextrose 40% Gel 15 Gram(s) Oral once PRN Blood Glucose LESS THAN 70 milliGRAM(s)/deciliter  glucagon  Injectable 1 milliGRAM(s) IntraMuscular once PRN Glucose LESS THAN 70 milligrams/deciliter  HYDROmorphone  Injectable 0.5 milliGRAM(s) IV Push every 4 hours PRN Moderate Pain (4 - 6)      Allergies  No Known Allergies    Review of Systems:  Constitutional: No fever  Eyes: No blurry vision  HEENT: No pain  Cardiovascular: No chest pain, palpitations  Respiratory: No SOB, no cough  GI: No nausea, vomiting, abdominal pain  ALL OTHER SYSTEMS REVIEWED AND NEGATIVE  UNABLE TO OBTAIN/LIMITED due to dementia and he is tangential at times    PHYSICAL EXAM:  VITALS: T(C): 36.1 (02-18-19 @ 09:11)  T(F): 97 (02-18-19 @ 09:11), Max: 98.4 (02-17-19 @ 14:06)  HR: 66 (02-18-19 @ 09:11) (52 - 80)  BP: 117/61 (02-18-19 @ 09:11) (94/50 - 117/61)  RR:  (17 - 18)  SpO2:  (100% - 100%)  Wt(kg): --  GENERAL: NAD, well-groomed, well-developed  RESPIRATORY: Clear to auscultation bilaterally; No rales, rhonchi, wheezing, or rubs  CARDIOVASCULAR: Regular rate and rhythm; No murmurs; no peripheral edema  GI: Soft, nontender, non distended, normal bowel sounds  SKIN: Dry, intact, No rashes or lesions  MUSCULOSKELETAL: Moves all extremities  PSYCH: Alert on constant observatio    POCT Blood Glucose.: 148 mg/dL (02-18-19 @ 05:33)  POCT Blood Glucose.: 153 mg/dL (02-18-19 @ 01:06)  POCT Blood Glucose.: 154 mg/dL (02-18-19 @ 00:36)  POCT Blood Glucose.: 67 mg/dL (02-17-19 @ 23:57)  POCT Blood Glucose.: 65 mg/dL (02-17-19 @ 23:50)  POCT Blood Glucose.: 206 mg/dL (02-17-19 @ 18:12)  Humalog 2  POCT Blood Glucose.: 179 mg/dL (02-17-19 @ 12:59)  Humalog 1  POCT Blood Glucose.: 185 mg/dL (02-17-19 @ 05:04)  Humalog 1  POCT Blood Glucose.: 149 mg/dL (02-16-19 @ 23:26)  POCT Blood Glucose.: 190 mg/dL (02-16-19 @ 18:07)  POCT Blood Glucose.: 189 mg/dL (02-16-19 @ 12:14)  POCT Blood Glucose.: 213 mg/dL (02-16-19 @ 06:36)  POCT Blood Glucose.: 250 mg/dL (02-16-19 @ 00:31)  POCT Blood Glucose.: 206 mg/dL (02-15-19 @ 12:30)      02-18    136  |  102  |  5<L>  ----------------------------<  134<H>  4.7   |  23  |  0.62    EGFR if : 107  EGFR if non : 92    Ca    8.2<L>      02-18  Mg     2.0     02-18  Phos  2.4     02-18  Hemoglobin A1C, Whole Blood: 6.2 % <H> [4.0 - 5.6] (01-31-19 @ 11:05)

## 2019-02-18 NOTE — PROGRESS NOTE ADULT - ASSESSMENT
83M with tightly controlled type 2 diabetes, prostate ca, CAD, MI s/p stents, GERD, HLD, HTN, who was BIBEMS for symptomatic hypoglycemia. Currently on D5NS and clear liquid diet

## 2019-02-18 NOTE — PROGRESS NOTE ADULT - SUBJECTIVE AND OBJECTIVE BOX
SURGERY DAILY PROGRESS NOTE:       SUBJECTIVE/ROS: Patient examined at bedside. Hypoglycemic to 65 overnight, responded well to 1amp of D50.  Tolerates NPO  w/o N/V. Ostomy output w/ air in the bag. Pain well controlled.          MEDICATIONS  (STANDING):  acetaminophen  IVPB .. 1000 milliGRAM(s) IV Intermittent once  ATENolol  Tablet 50 milliGRAM(s) Oral daily  dextrose 5% + sodium chloride 0.45% with potassium chloride 20 mEq/L 1000 milliLiter(s) (100 mL/Hr) IV Continuous <Continuous>  dextrose 5%. 1000 milliLiter(s) (50 mL/Hr) IV Continuous <Continuous>  dextrose 50% Injectable 12.5 Gram(s) IV Push once  dextrose 50% Injectable 25 Gram(s) IV Push once  dextrose 50% Injectable 25 Gram(s) IV Push once  enoxaparin Injectable 40 milliGRAM(s) SubCutaneous daily  famotidine    Tablet 20 milliGRAM(s) Oral two times a day  insulin lispro (HumaLOG) corrective regimen sliding scale   SubCutaneous every 6 hours  sodium phosphate IVPB 15 milliMole(s) IV Intermittent once  sodium phosphate IVPB 15 milliMole(s) IV Intermittent once  tamsulosin 0.4 milliGRAM(s) Oral at bedtime    MEDICATIONS  (PRN):  dextrose 40% Gel 15 Gram(s) Oral once PRN Blood Glucose LESS THAN 70 milliGRAM(s)/deciliter  glucagon  Injectable 1 milliGRAM(s) IntraMuscular once PRN Glucose LESS THAN 70 milligrams/deciliter  HYDROmorphone  Injectable 0.5 milliGRAM(s) IV Push every 4 hours PRN Moderate Pain (4 - 6)      OBJECTIVE:    Vital Signs Last 24 Hrs  T(C): 36.6 (2019 07:54), Max: 36.9 (2019 14:06)  T(F): 97.9 (2019 07:54), Max: 98.4 (2019 14:06)  HR: 52 (2019 07:54) (52 - 82)  BP: 98/48 (2019 07:54) (94/50 - 109/57)  BP(mean): --  RR: 17 (2019 07:54) (17 - 19)  SpO2: 100% (2019 07:54) (99% - 100%)        I&O's Detail    2019 07:01  -  2019 07:00  --------------------------------------------------------  IN:  Total IN: 0 mL    OUT:    Colostomy: 20 mL    Voided: 500 mL  Total OUT: 520 mL    Total NET: -520 mL          Daily     Daily     LABS:                        10.0   7.66  )-----------( 329      ( 2019 05:30 )             31.9     02-18    136  |  102  |  5<L>  ----------------------------<  134<H>  4.7   |  23  |  0.62    Ca    8.2<L>      2019 05:30  Phos  2.4       Mg     2.0     18        Urinalysis Basic - ( 2019 12:00 )    Color: YELLOW / Appearance: Lt TURBID / S.034 / pH: 6.0  Gluc: 30 / Ketone: TRACE  / Bili: NEGATIVE / Urobili: NORMAL   Blood: NEGATIVE / Protein: 50 / Nitrite: NEGATIVE   Leuk Esterase: NEGATIVE / RBC: 3-5 / WBC 6-10   Sq Epi: MODERATE / Non Sq Epi: x / Bacteria: NEGATIVE        Objective:  NAD, awake and alert  Respirations nonlabored  Abdomen soft, nontender, nondistended, ostomy w/ brown liquid and air, incision site c/d/i  No guarding or rebound tenderness

## 2019-02-18 NOTE — PROGRESS NOTE ADULT - ASSESSMENT
84 y/o male w / pmhx BPH, prostate ca, CAD, MI s/p stents, DM, GERD, HLD, HTN, and recently diagnosed colon mass who was scheduled for resection today but had an episode of hypoglycemia to 29 this morning, likely secondary to excessive insulin administration.and sx cancelled ans sx done yesterday       s/p colectomy/ileostomy  hypoglycemic last pm  c/w fluids until sufficient po intake then dc fluids  use fsg riss for glucose over 200  no long acting insulin for now       mild confusion likely related from hospital attributed tounderlying dementia       cad  cont meds      mild hyponatremia /improved  f/u lytes noted        dvt proph     oob

## 2019-02-18 NOTE — PROGRESS NOTE ADULT - ASSESSMENT
83M pmhx BPH, prostate ca, CAD, MI s/p stents, DM, GERD, HLD, HTN, and recently diagnosed colon mass who was scheduled for resection today but had an episode of hypoglycemia to 29 prior to presentation, likely secondary to excessive insulin administration. Now s/p ex lap w/ small bowel resection, right colectomy, and end ileostomy on 2/15.    - Advance diet and decrease/dc IVF  - Ostomy nurse teaching   - Endo recs for hypoglycemia   -  f/u labs  - C/w home meds except Lasix   - OOB/PT  - DVT ppx    Surgery [id number]

## 2019-02-18 NOTE — PROGRESS NOTE ADULT - PROBLEM SELECTOR PLAN 1
-patient needs nutrition optimization. If his diet is ready to be advanced, it should be done so by primary team  -has hypoglycemia likely due to no PO intake  -change to a custom sliding scale for pre-meals and at bedtime  -endocrine will continue to follow

## 2019-02-19 LAB
ANION GAP SERPL CALC-SCNC: 12 MMO/L — SIGNIFICANT CHANGE UP (ref 7–14)
BUN SERPL-MCNC: 5 MG/DL — LOW (ref 7–23)
CALCIUM SERPL-MCNC: 8.4 MG/DL — SIGNIFICANT CHANGE UP (ref 8.4–10.5)
CHLORIDE SERPL-SCNC: 105 MMOL/L — SIGNIFICANT CHANGE UP (ref 98–107)
CO2 SERPL-SCNC: 22 MMOL/L — SIGNIFICANT CHANGE UP (ref 22–31)
CREAT SERPL-MCNC: 0.54 MG/DL — SIGNIFICANT CHANGE UP (ref 0.5–1.3)
GAD65 AB SER-MCNC: 0 NMOL/L — SIGNIFICANT CHANGE UP
GLUCOSE BLDC GLUCOMTR-MCNC: 150 MG/DL — HIGH (ref 70–99)
GLUCOSE BLDC GLUCOMTR-MCNC: 156 MG/DL — HIGH (ref 70–99)
GLUCOSE BLDC GLUCOMTR-MCNC: 174 MG/DL — HIGH (ref 70–99)
GLUCOSE BLDC GLUCOMTR-MCNC: 208 MG/DL — HIGH (ref 70–99)
GLUCOSE SERPL-MCNC: 159 MG/DL — HIGH (ref 70–99)
HCT VFR BLD CALC: 34.8 % — LOW (ref 39–50)
HGB BLD-MCNC: 11.1 G/DL — LOW (ref 13–17)
MAGNESIUM SERPL-MCNC: 1.9 MG/DL — SIGNIFICANT CHANGE UP (ref 1.6–2.6)
MCHC RBC-ENTMCNC: 26.7 PG — LOW (ref 27–34)
MCHC RBC-ENTMCNC: 31.9 % — LOW (ref 32–36)
MCV RBC AUTO: 83.9 FL — SIGNIFICANT CHANGE UP (ref 80–100)
NRBC # FLD: 0 K/UL — LOW (ref 25–125)
PHOSPHATE SERPL-MCNC: 2.3 MG/DL — LOW (ref 2.5–4.5)
PLATELET # BLD AUTO: 297 K/UL — SIGNIFICANT CHANGE UP (ref 150–400)
PMV BLD: 8.4 FL — SIGNIFICANT CHANGE UP (ref 7–13)
POTASSIUM SERPL-MCNC: 4.6 MMOL/L — SIGNIFICANT CHANGE UP (ref 3.5–5.3)
POTASSIUM SERPL-SCNC: 4.6 MMOL/L — SIGNIFICANT CHANGE UP (ref 3.5–5.3)
RBC # BLD: 4.15 M/UL — LOW (ref 4.2–5.8)
RBC # FLD: 18 % — HIGH (ref 10.3–14.5)
SODIUM SERPL-SCNC: 139 MMOL/L — SIGNIFICANT CHANGE UP (ref 135–145)
WBC # BLD: 7.52 K/UL — SIGNIFICANT CHANGE UP (ref 3.8–10.5)
WBC # FLD AUTO: 7.52 K/UL — SIGNIFICANT CHANGE UP (ref 3.8–10.5)

## 2019-02-19 RX ORDER — OXYCODONE HYDROCHLORIDE 5 MG/1
5 TABLET ORAL EVERY 4 HOURS
Qty: 0 | Refills: 0 | Status: DISCONTINUED | OUTPATIENT
Start: 2019-02-19 | End: 2019-02-20

## 2019-02-19 RX ADMIN — TAMSULOSIN HYDROCHLORIDE 0.8 MILLIGRAM(S): 0.4 CAPSULE ORAL at 21:35

## 2019-02-19 RX ADMIN — ATENOLOL 50 MILLIGRAM(S): 25 TABLET ORAL at 06:20

## 2019-02-19 RX ADMIN — ENOXAPARIN SODIUM 40 MILLIGRAM(S): 100 INJECTION SUBCUTANEOUS at 12:50

## 2019-02-19 RX ADMIN — Medication 63.75 MILLIMOLE(S): at 09:19

## 2019-02-19 RX ADMIN — Medication 40 MILLIGRAM(S): at 06:20

## 2019-02-19 RX ADMIN — Medication 1: at 12:51

## 2019-02-19 RX ADMIN — FAMOTIDINE 20 MILLIGRAM(S): 10 INJECTION INTRAVENOUS at 17:42

## 2019-02-19 RX ADMIN — Medication 1: at 17:42

## 2019-02-19 RX ADMIN — FAMOTIDINE 20 MILLIGRAM(S): 10 INJECTION INTRAVENOUS at 06:20

## 2019-02-19 NOTE — PROGRESS NOTE ADULT - SUBJECTIVE AND OBJECTIVE BOX
CHIEF COMPLAINT:Patient is a 83y old  Male who presents with a chief complaint of hypoglycemia (15 Feb 2019 14:47)    	        PAST MEDICAL & SURGICAL HISTORY:  Lymphoma  Benign neoplasm of colon  Prostate cancer  Stented coronary artery: 2006  MI (myocardial infarction)  CAD (coronary artery disease)  HLD (hyperlipidemia)  GERD (gastroesophageal reflux disease)  Benign prostatic hypertrophy  Diabetes: Type II  HTN (hypertension)  CAD (coronary artery disease): 1 cardiac stent 2006 was on plavix, currently pt is not on aspirin  History of prostate surgery: 2007 seed implants          REVIEW OF SYSTEMS:  EYES: No eye pain, visual disturbances, or discharge  NECK: No pain or stiffness  RESPIRATORY: No cough, wheezing, chills or hemoptysis; No Shortness of Breath  CARDIOVASCULAR: No chest pain, palpitations, passing out, dizziness, or leg swelling  GASTROINTESTINAL:abd pain better .  GENITOURINARY: No dysuria, frequency, hematuria, or incontinence  NEUROLOGICAL: No headaches,       Medications:  MEDICATIONS  (STANDING):  ATENolol  Tablet 50 milliGRAM(s) Oral daily  dextrose 5%. 1000 milliLiter(s) (50 mL/Hr) IV Continuous <Continuous>  dextrose 50% Injectable 12.5 Gram(s) IV Push once  dextrose 50% Injectable 25 Gram(s) IV Push once  dextrose 50% Injectable 25 Gram(s) IV Push once  enoxaparin Injectable 40 milliGRAM(s) SubCutaneous daily  famotidine    Tablet 20 milliGRAM(s) Oral two times a day  furosemide    Tablet 40 milliGRAM(s) Oral daily  insulin lispro (HumaLOG) corrective regimen sliding scale   SubCutaneous three times a day before meals  insulin lispro (HumaLOG) corrective regimen sliding scale   SubCutaneous at bedtime  tamsulosin 0.8 milliGRAM(s) Oral at bedtime    MEDICATIONS  (PRN):  dextrose 40% Gel 15 Gram(s) Oral once PRN Blood Glucose LESS THAN 70 milliGRAM(s)/deciliter  glucagon  Injectable 1 milliGRAM(s) IntraMuscular once PRN Glucose LESS THAN 70 milligrams/deciliter  oxyCODONE    IR 5 milliGRAM(s) Oral every 4 hours PRN Moderate Pain (4 - 6)    	    PHYSICAL EXAM:  T(C): 36.6 (02-19-19 @ 08:46), Max: 36.9 (02-18-19 @ 20:52)  HR: 75 (02-19-19 @ 08:46) (60 - 89)  BP: 124/74 (02-19-19 @ 08:46) (113/60 - 142/76)  RR: 16 (02-19-19 @ 08:46) (16 - 17)  SpO2: 100% (02-19-19 @ 08:46) (100% - 100%)  Wt(kg): --  I&O's Summary    18 Feb 2019 07:01  -  19 Feb 2019 07:00  --------------------------------------------------------  IN: 0 mL / OUT: 1280 mL / NET: -1280 mL    19 Feb 2019 07:01  -  19 Feb 2019 10:23  --------------------------------------------------------  IN: 0 mL / OUT: 100 mL / NET: -100 mL        Appearance: Normal	  HEENT:   Normal oral mucosa, PERRL, EOMI	  Lymphatic: No lymphadenopathy  Cardiovascular: Normal S1 S2, No JVD, No murmurs, No edema  Respiratory: Lungs clear to auscultation	  Gastrointestinal:  Soft, Non-tender, + BS	ostomy   Skin: No rashes, No ecchymoses, No cyanosis	  Neurologic: Non-focal/generalized weakness   Extremities:dec  range of motion, No clubbing, cyanosis or edema  Vascular: Peripheral pulses palpable 2+ bilaterally    TELEMETRY: 	    ECG:  	  RADIOLOGY:  OTHER: 	  	  LABS:	 	    CARDIAC MARKERS:                                11.1   7.52  )-----------( 297      ( 19 Feb 2019 05:56 )             34.8     02-19    139  |  105  |  5<L>  ----------------------------<  159<H>  4.6   |  22  |  0.54    Ca    8.4      19 Feb 2019 05:56  Phos  2.3     02-19  Mg     1.9     02-19      proBNP:   Lipid Profile:   HgA1c:   TSH:

## 2019-02-19 NOTE — PROGRESS NOTE ADULT - ASSESSMENT
83M with tightly controlled type 2 diabetes, prostate ca, CAD, MI s/p stents, GERD, HLD, HTN, who was BIBEMS for symptomatic hypoglycemia. now on low fiber diet

## 2019-02-19 NOTE — PROGRESS NOTE ADULT - SUBJECTIVE AND OBJECTIVE BOX
History:    MEDICATIONS  (STANDING):  ATENolol  Tablet 50 milliGRAM(s) Oral daily  dextrose 5% + sodium chloride 0.45% with potassium chloride 20 mEq/L 1000 milliLiter(s) (100 mL/Hr) IV Continuous <Continuous>  dextrose 5%. 1000 milliLiter(s) (50 mL/Hr) IV Continuous <Continuous>  dextrose 50% Injectable 12.5 Gram(s) IV Push once  dextrose 50% Injectable 25 Gram(s) IV Push once  dextrose 50% Injectable 25 Gram(s) IV Push once  enoxaparin Injectable 40 milliGRAM(s) SubCutaneous daily  famotidine    Tablet 20 milliGRAM(s) Oral two times a day  insulin lispro (HumaLOG) corrective regimen sliding scale   SubCutaneous Before meals and at bedtime  sodium phosphate IVPB 15 milliMole(s) IV Intermittent once  tamsulosin 0.4 milliGRAM(s) Oral at bedtime    MEDICATIONS  (PRN):  dextrose 40% Gel 15 Gram(s) Oral once PRN Blood Glucose LESS THAN 70 milliGRAM(s)/deciliter  glucagon  Injectable 1 milliGRAM(s) IntraMuscular once PRN Glucose LESS THAN 70 milligrams/deciliter  HYDROmorphone  Injectable 0.5 milliGRAM(s) IV Push every 4 hours PRN Moderate Pain (4 - 6)      Allergies  No Known Allergies    Review of Systems:    ALL OTHER SYSTEMS REVIEWED AND NEGATIVE  UNABLE TO OBTAIN/LIMITED due to dementia and he is tangential at times    PHYSICAL EXAM:  Vital Signs Last 24 Hrs  T(C): 36.3 (19 Feb 2019 11:45), Max: 36.9 (18 Feb 2019 20:52)  T(F): 97.3 (19 Feb 2019 11:45), Max: 98.5 (18 Feb 2019 20:52)  HR: 67 (19 Feb 2019 11:45) (60 - 89)  BP: 115/57 (19 Feb 2019 11:45) (115/57 - 142/76)  BP(mean): --  RR: 16 (19 Feb 2019 11:45) (16 - 17)  SpO2: 100% (19 Feb 2019 11:45) (100% - 100%)  GENERAL: NAD, well-groomed, well-developed  RESPIRATORY: Clear to auscultation bilaterally; No rales, rhonchi, wheezing, or rubs  CARDIOVASCULAR: Regular rate and rhythm; No murmurs; no peripheral edema  GI: Soft, nontender, non distended, normal bowel sounds  SKIN: Dry, intact, No rashes or lesions  MUSCULOSKELETAL: Moves all extremities  PSYCH: Alert on constant observatio    CAPILLARY BLOOD GLUCOSE      POCT Blood Glucose.: 174 mg/dL (19 Feb 2019 12:39)  POCT Blood Glucose.: 150 mg/dL (19 Feb 2019 08:44)  POCT Blood Glucose.: 150 mg/dL (18 Feb 2019 22:03)  POCT Blood Glucose.: 163 mg/dL (18 Feb 2019 17:18)    POCT Blood Glucose.: 148 mg/dL (02-18-19 @ 05:33)  POCT Blood Glucose.: 153 mg/dL (02-18-19 @ 01:06)  POCT Blood Glucose.: 154 mg/dL (02-18-19 @ 00:36)  POCT Blood Glucose.: 67 mg/dL (02-17-19 @ 23:57)  POCT Blood Glucose.: 65 mg/dL (02-17-19 @ 23:50)  POCT Blood Glucose.: 206 mg/dL (02-17-19 @ 18:12)  Humalog 2  POCT Blood Glucose.: 179 mg/dL (02-17-19 @ 12:59)  Humalog 1  POCT Blood Glucose.: 185 mg/dL (02-17-19 @ 05:04)  Humalog 1  POCT Blood Glucose.: 149 mg/dL (02-16-19 @ 23:26)  POCT Blood Glucose.: 190 mg/dL (02-16-19 @ 18:07)  POCT Blood Glucose.: 189 mg/dL (02-16-19 @ 12:14)  POCT Blood Glucose.: 213 mg/dL (02-16-19 @ 06:36)  POCT Blood Glucose.: 250 mg/dL (02-16-19 @ 00:31)  POCT Blood Glucose.: 206 mg/dL (02-15-19 @ 12:30)      02-18    136  |  102  |  5<L>  ----------------------------<  134<H>  4.7   |  23  |  0.62    EGFR if : 107  EGFR if non : 92    Ca    8.2<L>      02-18  Mg     2.0     02-18  Phos  2.4     02-18  Hemoglobin A1C, Whole Blood: 6.2 % <H> [4.0 - 5.6] (01-31-19 @ 11:05) History:  starting to eat and tolerate.  no n/v.  no hypoglycemia    MEDICATIONS  (STANDING):  ATENolol  Tablet 50 milliGRAM(s) Oral daily  dextrose 5% + sodium chloride 0.45% with potassium chloride 20 mEq/L 1000 milliLiter(s) (100 mL/Hr) IV Continuous <Continuous>  dextrose 5%. 1000 milliLiter(s) (50 mL/Hr) IV Continuous <Continuous>  dextrose 50% Injectable 12.5 Gram(s) IV Push once  dextrose 50% Injectable 25 Gram(s) IV Push once  dextrose 50% Injectable 25 Gram(s) IV Push once  enoxaparin Injectable 40 milliGRAM(s) SubCutaneous daily  famotidine    Tablet 20 milliGRAM(s) Oral two times a day  insulin lispro (HumaLOG) corrective regimen sliding scale   SubCutaneous Before meals and at bedtime  sodium phosphate IVPB 15 milliMole(s) IV Intermittent once  tamsulosin 0.4 milliGRAM(s) Oral at bedtime    MEDICATIONS  (PRN):  dextrose 40% Gel 15 Gram(s) Oral once PRN Blood Glucose LESS THAN 70 milliGRAM(s)/deciliter  glucagon  Injectable 1 milliGRAM(s) IntraMuscular once PRN Glucose LESS THAN 70 milligrams/deciliter  HYDROmorphone  Injectable 0.5 milliGRAM(s) IV Push every 4 hours PRN Moderate Pain (4 - 6)      Allergies  No Known Allergies    Review of Systems:    ALL OTHER SYSTEMS REVIEWED AND NEGATIVE  UNABLE TO OBTAIN/LIMITED due to dementia and he is tangential at times    PHYSICAL EXAM:  Vital Signs Last 24 Hrs  T(C): 36.3 (19 Feb 2019 11:45), Max: 36.9 (18 Feb 2019 20:52)  T(F): 97.3 (19 Feb 2019 11:45), Max: 98.5 (18 Feb 2019 20:52)  HR: 67 (19 Feb 2019 11:45) (60 - 89)  BP: 115/57 (19 Feb 2019 11:45) (115/57 - 142/76)  BP(mean): --  RR: 16 (19 Feb 2019 11:45) (16 - 17)  SpO2: 100% (19 Feb 2019 11:45) (100% - 100%)  GENERAL: NAD, well-groomed, well-developed  GI: Soft, nontender, non distended, normal bowel sounds  SKIN: Dry, intact, No rashes or lesions  PSYCH: Alert and oriented x 3    CAPILLARY BLOOD GLUCOSE    POCT Blood Glucose.: 174 mg/dL (19 Feb 2019 12:39)  POCT Blood Glucose.: 150 mg/dL (19 Feb 2019 08:44)  POCT Blood Glucose.: 150 mg/dL (18 Feb 2019 22:03)  POCT Blood Glucose.: 163 mg/dL (18 Feb 2019 17:18)    POCT Blood Glucose.: 148 mg/dL (02-18-19 @ 05:33)  POCT Blood Glucose.: 153 mg/dL (02-18-19 @ 01:06)  POCT Blood Glucose.: 154 mg/dL (02-18-19 @ 00:36)  POCT Blood Glucose.: 67 mg/dL (02-17-19 @ 23:57)  POCT Blood Glucose.: 65 mg/dL (02-17-19 @ 23:50)  POCT Blood Glucose.: 206 mg/dL (02-17-19 @ 18:12)  Humalog 2  POCT Blood Glucose.: 179 mg/dL (02-17-19 @ 12:59)  Humalog 1  POCT Blood Glucose.: 185 mg/dL (02-17-19 @ 05:04)  Humalog 1  POCT Blood Glucose.: 149 mg/dL (02-16-19 @ 23:26)  POCT Blood Glucose.: 190 mg/dL (02-16-19 @ 18:07)  POCT Blood Glucose.: 189 mg/dL (02-16-19 @ 12:14)  POCT Blood Glucose.: 213 mg/dL (02-16-19 @ 06:36)  POCT Blood Glucose.: 250 mg/dL (02-16-19 @ 00:31)  POCT Blood Glucose.: 206 mg/dL (02-15-19 @ 12:30)      02-18    136  |  102  |  5<L>  ----------------------------<  134<H>  4.7   |  23  |  0.62    EGFR if : 107  EGFR if non : 92    Ca    8.2<L>      02-18  Mg     2.0     02-18  Phos  2.4     02-18    C-Peptide, Serum: 0.2 ng/mL (02.14.19 @ 05:09)      Hemoglobin A1C, Whole Blood: 6.2 % <H> [4.0 - 5.6] (01-31-19 @ 11:05)

## 2019-02-19 NOTE — PROGRESS NOTE ADULT - ASSESSMENT
84 y/o male w / pmhx BPH, prostate ca, CAD, MI s/p stents, DM, GERD, HLD, HTN, and recently diagnosed colon mass who was scheduled for resection today but had an episode of hypoglycemia to 29 this morning, likely secondary to excessive insulin administration.and sx cancelled ans sx done yesterday       s/p colectomy/ileostomy  hypoglycemia better  endo recs noted       mild confusion likely related from hospital attributed to underlying dementia       cad  cont meds      mild hyponatremia /improved  f/u lytes noted        dvt proph     oob    rehab as per sx

## 2019-02-19 NOTE — PROGRESS NOTE ADULT - ASSESSMENT
83M pmhx BPH, prostate ca, CAD, MI s/p stents, DM, GERD, HLD, HTN, and recently diagnosed colon mass who was scheduled for resection today but had an episode of hypoglycemia to 29 prior to presentation, likely secondary to excessive insulin administration. Now s/p ex lap w/ small bowel resection, right colectomy, and end ileostomy on 2/15.    - LRD  - Ostomy nurse teaching   - Endo recs for hypoglycemia   -  f/u labs  - C/w home meds except Lasix   - OOB/PT  - DVT ppx  - rehab planning    Surgery [id number]

## 2019-02-19 NOTE — PROGRESS NOTE ADULT - SUBJECTIVE AND OBJECTIVE BOX
GENERAL SURGERY DAILY PROGRESS NOTE:     Subjective:  Pt seen and examined. No acute events overnight. Tolerating LRD, having ostomy function Pain well controlled. Denies n/v/f/c.     Objective:    NAD, awake and alert  Respirations nonlabored  Abdomen soft, nontender, nondistended, ostomy in place w/ liquid output/stool/air  No guarding or rebound tenderness      MEDICATIONS  (STANDING):  ATENolol  Tablet 50 milliGRAM(s) Oral daily  dextrose 5%. 1000 milliLiter(s) (50 mL/Hr) IV Continuous <Continuous>  dextrose 50% Injectable 12.5 Gram(s) IV Push once  dextrose 50% Injectable 25 Gram(s) IV Push once  dextrose 50% Injectable 25 Gram(s) IV Push once  enoxaparin Injectable 40 milliGRAM(s) SubCutaneous daily  famotidine    Tablet 20 milliGRAM(s) Oral two times a day  furosemide    Tablet 40 milliGRAM(s) Oral daily  insulin lispro (HumaLOG) corrective regimen sliding scale   SubCutaneous three times a day before meals  insulin lispro (HumaLOG) corrective regimen sliding scale   SubCutaneous at bedtime  sodium phosphate IVPB 15 milliMole(s) IV Intermittent once  tamsulosin 0.8 milliGRAM(s) Oral at bedtime    MEDICATIONS  (PRN):  dextrose 40% Gel 15 Gram(s) Oral once PRN Blood Glucose LESS THAN 70 milliGRAM(s)/deciliter  glucagon  Injectable 1 milliGRAM(s) IntraMuscular once PRN Glucose LESS THAN 70 milligrams/deciliter  HYDROmorphone  Injectable 0.5 milliGRAM(s) IV Push every 4 hours PRN Moderate Pain (4 - 6)      Vital Signs Last 24 Hrs  T(C): 36.5 (19 Feb 2019 05:19), Max: 36.9 (18 Feb 2019 20:52)  T(F): 97.7 (19 Feb 2019 05:19), Max: 98.5 (18 Feb 2019 20:52)  HR: 82 (19 Feb 2019 05:19) (52 - 89)  BP: 133/70 (19 Feb 2019 05:19) (98/48 - 142/76)  BP(mean): --  RR: 17 (19 Feb 2019 05:19) (16 - 17)  SpO2: 100% (19 Feb 2019 05:19) (100% - 100%)    I&O's Detail    18 Feb 2019 07:01  -  19 Feb 2019 07:00  --------------------------------------------------------  IN:  Total IN: 0 mL    OUT:    Colostomy: 30 mL    Voided: 1250 mL  Total OUT: 1280 mL    Total NET: -1280 mL          Daily     Daily     LABS:                        11.1   7.52  )-----------( 297      ( 19 Feb 2019 05:56 )             34.8     02-19    139  |  105  |  5<L>  ----------------------------<  159<H>  4.6   |  22  |  0.54    Ca    8.4      19 Feb 2019 05:56  Phos  2.3     02-19  Mg     1.9     02-19            RADIOLOGY & ADDITIONAL STUDIES:

## 2019-02-19 NOTE — PROGRESS NOTE ADULT - PROBLEM SELECTOR PLAN 1
target bg 100-200mg/dl  -change to a custom sliding scale for pre-meals and at bedtime  -endocrine will continue to follow target bg 100-200mg/dl  c/w custom low correction scale for now, as patient eats more, patient may require basal bolus insulin.  Will continue to monitor   Dc plan likely basal/bolus- Marynet has c-peptide of 0.2, so patient will need some insulin at discahrge

## 2019-02-20 ENCOUNTER — TRANSCRIPTION ENCOUNTER (OUTPATIENT)
Age: 84
End: 2019-02-20

## 2019-02-20 VITALS
DIASTOLIC BLOOD PRESSURE: 52 MMHG | RESPIRATION RATE: 18 BRPM | SYSTOLIC BLOOD PRESSURE: 79 MMHG | TEMPERATURE: 98 F | OXYGEN SATURATION: 97 % | HEART RATE: 78 BPM

## 2019-02-20 LAB
ANION GAP SERPL CALC-SCNC: 10 MMO/L — SIGNIFICANT CHANGE UP (ref 7–14)
BUN SERPL-MCNC: 6 MG/DL — LOW (ref 7–23)
CALCIUM SERPL-MCNC: 8.2 MG/DL — LOW (ref 8.4–10.5)
CHLORIDE SERPL-SCNC: 105 MMOL/L — SIGNIFICANT CHANGE UP (ref 98–107)
CO2 SERPL-SCNC: 24 MMOL/L — SIGNIFICANT CHANGE UP (ref 22–31)
CREAT SERPL-MCNC: 0.55 MG/DL — SIGNIFICANT CHANGE UP (ref 0.5–1.3)
GLUCOSE BLDC GLUCOMTR-MCNC: 208 MG/DL — HIGH (ref 70–99)
GLUCOSE BLDC GLUCOMTR-MCNC: 220 MG/DL — HIGH (ref 70–99)
GLUCOSE SERPL-MCNC: 210 MG/DL — HIGH (ref 70–99)
HCT VFR BLD CALC: 33.8 % — LOW (ref 39–50)
HGB BLD-MCNC: 10.2 G/DL — LOW (ref 13–17)
MAGNESIUM SERPL-MCNC: 1.7 MG/DL — SIGNIFICANT CHANGE UP (ref 1.6–2.6)
MCHC RBC-ENTMCNC: 26 PG — LOW (ref 27–34)
MCHC RBC-ENTMCNC: 30.2 % — LOW (ref 32–36)
MCV RBC AUTO: 86.2 FL — SIGNIFICANT CHANGE UP (ref 80–100)
NRBC # FLD: 0 K/UL — LOW (ref 25–125)
PHOSPHATE SERPL-MCNC: 2.8 MG/DL — SIGNIFICANT CHANGE UP (ref 2.5–4.5)
PLATELET # BLD AUTO: 304 K/UL — SIGNIFICANT CHANGE UP (ref 150–400)
PMV BLD: 8.8 FL — SIGNIFICANT CHANGE UP (ref 7–13)
POTASSIUM SERPL-MCNC: 4.3 MMOL/L — SIGNIFICANT CHANGE UP (ref 3.5–5.3)
POTASSIUM SERPL-SCNC: 4.3 MMOL/L — SIGNIFICANT CHANGE UP (ref 3.5–5.3)
RBC # BLD: 3.92 M/UL — LOW (ref 4.2–5.8)
RBC # FLD: 18.4 % — HIGH (ref 10.3–14.5)
SODIUM SERPL-SCNC: 139 MMOL/L — SIGNIFICANT CHANGE UP (ref 135–145)
WBC # BLD: 6.32 K/UL — SIGNIFICANT CHANGE UP (ref 3.8–10.5)
WBC # FLD AUTO: 6.32 K/UL — SIGNIFICANT CHANGE UP (ref 3.8–10.5)

## 2019-02-20 PROCEDURE — 99232 SBSQ HOSP IP/OBS MODERATE 35: CPT

## 2019-02-20 RX ORDER — OXYCODONE HYDROCHLORIDE 5 MG/1
1 TABLET ORAL
Qty: 0 | Refills: 0 | DISCHARGE
Start: 2019-02-20

## 2019-02-20 RX ORDER — ENOXAPARIN SODIUM 100 MG/ML
40 INJECTION SUBCUTANEOUS
Qty: 0 | Refills: 0 | COMMUNITY
Start: 2019-02-20

## 2019-02-20 RX ORDER — INSULIN ASPART 100 [IU]/ML
20 INJECTION, SUSPENSION SUBCUTANEOUS
Qty: 0 | Refills: 0 | COMMUNITY

## 2019-02-20 RX ORDER — ATENOLOL 25 MG/1
1 TABLET ORAL
Qty: 0 | Refills: 0 | COMMUNITY

## 2019-02-20 RX ORDER — TAMSULOSIN HYDROCHLORIDE 0.4 MG/1
2 CAPSULE ORAL
Qty: 0 | Refills: 0 | DISCHARGE
Start: 2019-02-20

## 2019-02-20 RX ORDER — OXYCODONE HYDROCHLORIDE 5 MG/1
1 TABLET ORAL
Qty: 0 | Refills: 0 | COMMUNITY

## 2019-02-20 RX ORDER — METFORMIN HYDROCHLORIDE 850 MG/1
1 TABLET ORAL
Qty: 0 | Refills: 0 | COMMUNITY

## 2019-02-20 RX ORDER — FUROSEMIDE 40 MG
1 TABLET ORAL
Qty: 0 | Refills: 0 | DISCHARGE
Start: 2019-02-20

## 2019-02-20 RX ORDER — OXYCODONE HYDROCHLORIDE 5 MG/1
1 TABLET ORAL
Qty: 0 | Refills: 0 | COMMUNITY
Start: 2019-02-20

## 2019-02-20 RX ORDER — ENOXAPARIN SODIUM 100 MG/ML
40 INJECTION SUBCUTANEOUS
Qty: 0 | Refills: 0 | DISCHARGE
Start: 2019-02-20

## 2019-02-20 RX ORDER — FUROSEMIDE 40 MG
1 TABLET ORAL
Qty: 0 | Refills: 0 | COMMUNITY

## 2019-02-20 RX ORDER — TAMSULOSIN HYDROCHLORIDE 0.4 MG/1
2 CAPSULE ORAL
Qty: 0 | Refills: 0 | COMMUNITY

## 2019-02-20 RX ORDER — MAGNESIUM SULFATE 500 MG/ML
2 VIAL (ML) INJECTION ONCE
Qty: 0 | Refills: 0 | Status: COMPLETED | OUTPATIENT
Start: 2019-02-20 | End: 2019-02-20

## 2019-02-20 RX ORDER — ATENOLOL 25 MG/1
1 TABLET ORAL
Qty: 0 | Refills: 0 | DISCHARGE
Start: 2019-02-20

## 2019-02-20 RX ADMIN — Medication 1: at 08:40

## 2019-02-20 RX ADMIN — ATENOLOL 50 MILLIGRAM(S): 25 TABLET ORAL at 05:13

## 2019-02-20 RX ADMIN — Medication 50 GRAM(S): at 10:17

## 2019-02-20 RX ADMIN — Medication 40 MILLIGRAM(S): at 05:13

## 2019-02-20 RX ADMIN — Medication 1: at 12:53

## 2019-02-20 RX ADMIN — ENOXAPARIN SODIUM 40 MILLIGRAM(S): 100 INJECTION SUBCUTANEOUS at 12:54

## 2019-02-20 RX ADMIN — FAMOTIDINE 20 MILLIGRAM(S): 10 INJECTION INTRAVENOUS at 05:13

## 2019-02-20 NOTE — DISCHARGE NOTE ADULT - CARE PLAN
Principal Discharge DX:	Colon cancer  Goal:	s/p exploratory laparotomy, right colectomy, small bowel resection, end ileostomy creation  Assessment and plan of treatment:	WOUND CARE:  Please keep incisions clean and dry. Please do not Scrub or rub incisions. Do not use lotion or powder on incisions. Ostomy care as taught in hospital   BATHING: You may shower and/or sponge bathe. You may use warm soapy water in the shower and rinse, pat dry.  ACTIVITY: No heavy lifting or straining. Otherwise, you may return to your usual level of physical activity. If you are taking narcotic pain medication DO NOT drive a car, operate machinery or make important decisions.  DIET: low fiber diet  NOTIFY YOUR SURGEON IF YOU HAVE: any bleeding that does not stop, any pus draining from your wound(s), any fever (over 100.4 F) persistent nausea/vomiting, or if your pain is not controlled on your discharge pain medications, unable to urinate.  Please follow up with your primary care physician in one week regarding your hospitalization, bring copies of your discharge paperwork.  Please follow up with your surgeon, Dr. Leon as an outpatient, please call to schedule an appointment  Secondary Diagnosis:	Type 2 diabetes mellitus with hypoglycemia without coma, with long-term current use of insulin  Assessment and plan of treatment:	Per Endocrinology's recommendations you are being discharge to rehab on.............please continue medications, monitor your sugars,  and follow up with your Endocrinologist in 1 week Principal Discharge DX:	Colon cancer  Goal:	s/p exploratory laparotomy, right colectomy, small bowel resection, end ileostomy creation  Assessment and plan of treatment:	WOUND CARE:  Please keep incisions clean and dry. Please do not Scrub or rub incisions. Do not use lotion or powder on incisions. Ostomy care as taught in hospital. Staples to be removed at follow up appointment   BATHING: You may shower and/or sponge bathe. You may use warm soapy water in the shower and rinse, pat dry.  ACTIVITY: No heavy lifting or straining. Otherwise, you may return to your usual level of physical activity. If you are taking narcotic pain medication DO NOT drive a car, operate machinery or make important decisions.  DIET: low fiber diet  NOTIFY YOUR SURGEON IF YOU HAVE: any bleeding that does not stop, any pus draining from your wound(s), any fever (over 100.4 F) persistent nausea/vomiting, or if your pain is not controlled on your discharge pain medications, unable to urinate.  Please follow up with your primary care physician in one week regarding your hospitalization, bring copies of your discharge paperwork.  Please follow up with your surgeon, Dr. Leon as an outpatient, please call to schedule an appointment  You are being discharged on a 2 week course of lovenox to prevent blood clots  Secondary Diagnosis:	Type 2 diabetes mellitus with hypoglycemia without coma, with long-term current use of insulin  Assessment and plan of treatment:	Per Endocrinology's recommendations you are being discharge to rehab on low dose sliding scale with meals and 4 units lantus at bedtime. Please continue medications, monitor your sugars,  and follow up with your Endocrinologist in 1 week

## 2019-02-20 NOTE — DISCHARGE NOTE ADULT - MEDICATION SUMMARY - MEDICATIONS TO STOP TAKING
I will STOP taking the medications listed below when I get home from the hospital:    metFORMIN 1000 mg oral tablet  -- 1 tab(s) by mouth 2 times a day    NovoLOG Mix 70/30  -- 20 unit(s) subcutaneous 2 times a day

## 2019-02-20 NOTE — DISCHARGE NOTE ADULT - MEDICATION SUMMARY - MEDICATIONS TO TAKE
I will START or STAY ON the medications listed below when I get home from the hospital:    oxyCODONE 5 mg oral tablet  -- 1 tab(s) by mouth every 6 hours, As needed, severe Pain   -- Indication: For pain medication    tamsulosin 0.4 mg oral capsule  -- 2 cap(s) by mouth once a day (at bedtime)  -- Indication: For BPH    enoxaparin  -- 40 milligram(s) subcutaneous once a day x 2 weeks  -- Indication: For DVT ppx    Lantus 100 units/mL subcutaneous solution  -- 4 unit(s) subcutaneous once a day (at bedtime)  -- Indication: For DM medication    HumaLOG 100 units/mL injectable solution  -- injectable once a day (at bedtime)    Bedtime Corrective Scale  0 Unit(s) if Glucose 0 - 250  1 Unit(s) if Glucose 251 - 300  1 Unit(s) if Glucose 301 - 350  2 Unit(s) if Glucose 351 - 400  3 Unit(s) if Glucose GREATER THAN 400    -- Indication: For DM medication    HumaLOG 100 units/mL injectable solution  -- unit(s) injectable 3 times a day (before meals)  Corrective Scale  1 Unit(s) if Glucose 151 - 200  1 Unit(s) if Glucose 201 - 250  2 Unit(s) if Glucose 251 - 300  2 Unit(s) if Glucose 301 - 350  3 Unit(s) if Glucose 351 - 400  4 Unit(s) if Glucose GREATER THAN 400  -- Indication: For DM medication    atenolol 50 mg oral tablet  -- 1 tab(s) by mouth once a day  -- Indication: For Home medication    furosemide 40 mg oral tablet  -- 1 tab(s) by mouth once a day  -- Indication: For Home medication    raNITIdine 75 mg oral tablet  -- 1 tab(s) by mouth 2 times a day  -- Indication: For Home medication

## 2019-02-20 NOTE — CHART NOTE - NSCHARTNOTEFT_GEN_A_CORE
83M with tightly controlled type 2 diabetes, prostate ca, CAD, MI s/p stents, GERD, HLD, HTN, who was BIBEMS for symptomatic hypoglycemia. now on low fiber diet    Vital Signs Last 24 Hrs  T(C): 36.8 (20 Feb 2019 13:16), Max: 36.8 (19 Feb 2019 23:23)  T(F): 98.3 (20 Feb 2019 13:16), Max: 98.3 (20 Feb 2019 13:16)  HR: 78 (20 Feb 2019 13:16) (61 - 78)  BP: 79/52 (20 Feb 2019 13:16) (79/52 - 119/61)  BP(mean): --  RR: 18 (20 Feb 2019 13:16) (16 - 18)  SpO2: 97% (20 Feb 2019 13:16) (97% - 100%)    CAPILLARY BLOOD GLUCOSE      POCT Blood Glucose.: 220 mg/dL (20 Feb 2019 12:35)  POCT Blood Glucose.: 208 mg/dL (20 Feb 2019 08:30)  POCT Blood Glucose.: 156 mg/dL (19 Feb 2019 21:46)  POCT Blood Glucose.: 208 mg/dL (19 Feb 2019 17:24)      PLAN:    Would start lantus 5 units sq qhs  c/w low humalog correction scale ac/hs    DC planning- given cpeptide is 0.2 --Patient should be discharged on Lantus Solostar PEN 5 units sq qhs.  and humalog kwikepn following a low correction scale ac.  Daughter helps patient with injections and would be able to adminsiter for patient  Discontinue mixed insulin as patient was presenting with hypoglycemia and hypoglycemia unawareness.  can follow up with private endciorinologist post dc from rehab

## 2019-02-20 NOTE — PROGRESS NOTE ADULT - ASSESSMENT
82 y/o male w / pmhx BPH, prostate ca, CAD, MI s/p stents, DM, GERD, HLD, HTN, and recently diagnosed colon mass who was scheduled for resection today but had an episode of hypoglycemia to 29 this morning, likely secondary to excessive insulin administration.and sx cancelled ans sx done yesterday       s/p colectomy/ileostomy  hypoglycemia better  endo recs noted       confusion likely related from hospital attributed to underlying dementia improved      cad  cont meds      mild hyponatremia /improved  f/u lytes noted        dvt proph     oob    rehab as per sx hopefully today

## 2019-02-20 NOTE — DISCHARGE NOTE ADULT - HOSPITAL COURSE
83M pmhx BPH, prostate ca, CAD, MI s/p stents, DM, GERD, HLD, HTN, who was BIBEMS for symptomatic hypoglycemia. The patient was scheduled to undergo resection of a colon mass today with Dr. Leon. He had taken his bowel prep yesterday, and last night took 10 units of Novalog 70/30. Around 3-4am this morning the patient was noted to be somewhat obtunded by his daughter, who checked his finger stick, which was 29. EMS was called, the pt received glucagon and was brought to the ED, where he received additional D5 containing solution. His finger stick in the ED was 79.     Pt admitted to surgical oncology service. OR case cancelled. Pt seen in ED by anesthesia, would prefer to wait at least 1 more day. Case tentatively rescheduling for Friday 2/15    Endocrine consulted for pt's hx DMII and his episode hypoglycemia. Medicine consulted to optimize pt for OR and followed pt throughout hospitalization    2/15 pt taken to the OR for exploratory laparotomy, right colectomy, small bowel resection, end ileostomy creation. Pt received u pRBCs intraoperatively 83M pmhx BPH, prostate ca, CAD, MI s/p stents, DM, GERD, HLD, HTN, who was BIBEMS for symptomatic hypoglycemia. The patient was scheduled to undergo resection of a colon mass today with Dr. Leon. He had taken his bowel prep yesterday, and last night took 10 units of Novalog 70/30. Around 3-4am this morning the patient was noted to be somewhat obtunded by his daughter, who checked his finger stick, which was 29. EMS was called, the pt received glucagon and was brought to the ED, where he received additional D5 containing solution. His finger stick in the ED was 79.     Pt admitted to surgical oncology service. OR case cancelled. Pt seen in ED by anesthesia, would prefer to wait at least 1 more day. Case tentatively rescheduling for Friday 2/15    Endocrine consulted for pt's hx DMII and hypoglycemia. Medicine consulted to optimize pt for OR and followed pt throughout hospitalization    Pt with resolution hypoglycemia. 2/14 pt received bowel prep and on 2/15 was taken to the OR for exploratory laparotomy, right colectomy, small bowel resection, end ileostomy creation. Pt received 2u pRBCs intraoperatively. Pt tolerated procedure well    Pt's diet slowly advanced as tolerated. Started on flomax when tolerating clear liquids carson removed and passed TOV    While in hospital pt with episodes sundowning overnight for which was placed on supervision. Pt no longer having episodes and currently off enhanced supervision for >24hrs    PT consulted and recommend pt be discharged to rehab    Ostomy nurse consulted and provided teaching while in the hospital    Per Attending pt now stabe for discharge to rehab. Pt tolerating diet and + ostomy function. Per Endocrine pt to be discharged on.............Pt to follow up with Dr Leon as an outpatient, instructed to call to schedule appointment. 83M pmhx BPH, prostate ca, CAD, MI s/p stents, DM, GERD, HLD, HTN, who was BIBEMS for symptomatic hypoglycemia. The patient was scheduled to undergo resection of a colon mass today with Dr. Leon. He had taken his bowel prep yesterday, and last night took 10 units of Novalog 70/30. Around 3-4am this morning the patient was noted to be somewhat obtunded by his daughter, who checked his finger stick, which was 29. EMS was called, the pt received glucagon and was brought to the ED, where he received additional D5 containing solution. His finger stick in the ED was 79.     Pt admitted to surgical oncology service. OR case cancelled. Pt seen in ED by anesthesia, would prefer to wait at least 1 more day. Case tentatively rescheduling for Friday 2/15    Endocrine consulted for pt's hx DMII and hypoglycemia. Medicine consulted to optimize pt for OR and followed pt throughout hospitalization    Pt with resolution hypoglycemia. 2/14 pt received bowel prep and on 2/15 was taken to the OR for exploratory laparotomy, right colectomy, small bowel resection, end ileostomy creation. Pt received 2u pRBCs intraoperatively. Pt tolerated procedure well    Pt's diet slowly advanced as tolerated. Started on flomax when tolerating clear liquids carson removed and passed TOV    While in hospital pt with episodes sundowning overnight for which was placed on supervision. Pt no longer having episodes and currently off enhanced supervision for >24hrs    PT consulted and recommend pt be discharged to rehab    Ostomy nurse consulted and provided teaching while in the hospital    Per Attending pt now stabe for discharge to rehab. Pt tolerating diet and + ostomy function. Per Endocrine pt to be discharged on low dose sliding scale with meals and 4 units lantus at bedtime. Pt to follow up with Dr Leon as an outpatient, instructed to call to schedule appointment.

## 2019-02-20 NOTE — DISCHARGE NOTE ADULT - CARE PROVIDER_API CALL
Melo Leon)  Surgery  [address] [place] [address]  Phone: [phone]  Fax: [phone]  Follow Up Time:

## 2019-02-20 NOTE — DISCHARGE NOTE ADULT - PATIENT PORTAL LINK FT
You can access the [email or web address], offered by [facility], by registering with the following website: [email or web address]

## 2019-02-20 NOTE — DISCHARGE NOTE ADULT - CONDITION (STATED IN TERMS THAT PERMIT A SPECIFIC MEASURABLE COMPARISON WITH CONDITION ON ADMISSION):
PATIENT ARRIVED AMBULATORY TO ROOM C/O SYMPTOMS OF A UTI. PATIENT STATES THAT SHE WAS TREATED 5-6 WEEKS AGO FOR A UTI AND BELIEVES \"IT NEVER WENT AWAY\" +FREQUENCY. +URGENCY. +LOWER ABDOMINAL PAIN. NO HEMATURIA. +\"CLOUDY\" URINE. NO FEVERS. NO N/V/D.
stable

## 2019-02-20 NOTE — PROGRESS NOTE ADULT - SUBJECTIVE AND OBJECTIVE BOX
GENERAL SURGERY DAILY PROGRESS NOTE:     Subjective:  Pt seen and examined. No acute events overnight. Tolerating LRD, pain well controlled, voiding. Having ostomy function. No new complaints.     Objective:  NAD, awake and alert  Respirations nonlabored  Abdomen soft, nontender, nondistended, ostomy w/ dark liquid some formed stool and air, incision site c/d/i  No guarding or rebound tenderness      MEDICATIONS  (STANDING):  ATENolol  Tablet 50 milliGRAM(s) Oral daily  dextrose 5%. 1000 milliLiter(s) (50 mL/Hr) IV Continuous <Continuous>  dextrose 50% Injectable 12.5 Gram(s) IV Push once  dextrose 50% Injectable 25 Gram(s) IV Push once  dextrose 50% Injectable 25 Gram(s) IV Push once  enoxaparin Injectable 40 milliGRAM(s) SubCutaneous daily  famotidine    Tablet 20 milliGRAM(s) Oral two times a day  furosemide    Tablet 40 milliGRAM(s) Oral daily  insulin lispro (HumaLOG) corrective regimen sliding scale   SubCutaneous three times a day before meals  insulin lispro (HumaLOG) corrective regimen sliding scale   SubCutaneous at bedtime  magnesium sulfate  IVPB 2 Gram(s) IV Intermittent once  tamsulosin 0.8 milliGRAM(s) Oral at bedtime    MEDICATIONS  (PRN):  dextrose 40% Gel 15 Gram(s) Oral once PRN Blood Glucose LESS THAN 70 milliGRAM(s)/deciliter  glucagon  Injectable 1 milliGRAM(s) IntraMuscular once PRN Glucose LESS THAN 70 milligrams/deciliter  oxyCODONE    IR 5 milliGRAM(s) Oral every 4 hours PRN Moderate Pain (4 - 6)      Vital Signs Last 24 Hrs  T(C): 36.8 (19 Feb 2019 23:23), Max: 36.8 (19 Feb 2019 23:23)  T(F): 98.2 (19 Feb 2019 23:23), Max: 98.2 (19 Feb 2019 23:23)  HR: 78 (19 Feb 2019 23:23) (61 - 78)  BP: 100/51 (19 Feb 2019 23:23) (100/51 - 124/74)  BP(mean): --  RR: 18 (19 Feb 2019 23:23) (16 - 18)  SpO2: 99% (19 Feb 2019 23:23) (99% - 100%)    I&O's Detail    19 Feb 2019 07:01  -  20 Feb 2019 07:00  --------------------------------------------------------  IN:    IV PiggyBack: 126 mL    Oral Fluid: 500 mL  Total IN: 626 mL    OUT:    Colostomy: 850 mL    Voided: 545 mL  Total OUT: 1395 mL    Total NET: -769 mL          Daily     Daily     LABS:                        10.2   6.32  )-----------( 304      ( 20 Feb 2019 05:26 )             33.8     02-20    139  |  105  |  6<L>  ----------------------------<  210<H>  4.3   |  24  |  0.55    Ca    8.2<L>      20 Feb 2019 05:26  Phos  2.8     02-20  Mg     1.7     02-20            RADIOLOGY & ADDITIONAL STUDIES:

## 2019-02-20 NOTE — PROGRESS NOTE ADULT - ASSESSMENT
83M pmhx BPH, prostate ca, CAD, MI s/p stents, DM, GERD, HLD, HTN, and recently diagnosed colon mass who was scheduled for resection today but had an episode of hypoglycemia to 29 prior to presentation, likely secondary to excessive insulin administration. Now s/p ex lap w/ small bowel resection, right colectomy, and end ileostomy on 2/15.    - LRD  - Ostomy nurse teaching   - Endo recs for hypoglycemia   -  f/u labs  - C/w home meds except Lasix   - OOB/PT  - DVT ppx  - dc to rehab today    Surgery [id number]

## 2019-02-20 NOTE — DISCHARGE NOTE ADULT - PLAN OF CARE
s/p exploratory laparotomy, right colectomy, small bowel resection, end ileostomy creation WOUND CARE:  Please keep incisions clean and dry. Please do not Scrub or rub incisions. Do not use lotion or powder on incisions. Ostomy care as taught in hospital   BATHING: You may shower and/or sponge bathe. You may use warm soapy water in the shower and rinse, pat dry.  ACTIVITY: No heavy lifting or straining. Otherwise, you may return to your usual level of physical activity. If you are taking narcotic pain medication DO NOT drive a car, operate machinery or make important decisions.  DIET: low fiber diet  NOTIFY YOUR SURGEON IF YOU HAVE: any bleeding that does not stop, any pus draining from your wound(s), any fever (over 100.4 F) persistent nausea/vomiting, or if your pain is not controlled on your discharge pain medications, unable to urinate.  Please follow up with your primary care physician in one week regarding your hospitalization, bring copies of your discharge paperwork.  Please follow up with your surgeon, Dr. Leon as an outpatient, please call to schedule an appointment Per Endocrinology's recommendations you are being discharge to rehab on.............please continue medications, monitor your sugars,  and follow up with your Endocrinologist in 1 week WOUND CARE:  Please keep incisions clean and dry. Please do not Scrub or rub incisions. Do not use lotion or powder on incisions. Ostomy care as taught in hospital. Staples to be removed at follow up appointment   BATHING: You may shower and/or sponge bathe. You may use warm soapy water in the shower and rinse, pat dry.  ACTIVITY: No heavy lifting or straining. Otherwise, you may return to your usual level of physical activity. If you are taking narcotic pain medication DO NOT drive a car, operate machinery or make important decisions.  DIET: low fiber diet  NOTIFY YOUR SURGEON IF YOU HAVE: any bleeding that does not stop, any pus draining from your wound(s), any fever (over 100.4 F) persistent nausea/vomiting, or if your pain is not controlled on your discharge pain medications, unable to urinate.  Please follow up with your primary care physician in one week regarding your hospitalization, bring copies of your discharge paperwork.  Please follow up with your surgeon, Dr. Leon as an outpatient, please call to schedule an appointment  You are being discharged on a 2 week course of lovenox to prevent blood clots Per Endocrinology's recommendations you are being discharge to rehab on low dose sliding scale with meals and 4 units lantus at bedtime. Please continue medications, monitor your sugars,  and follow up with your Endocrinologist in 1 week

## 2019-02-20 NOTE — DISCHARGE NOTE ADULT - COMMUNITY RESOURCES
[facility]  [address] [place] [address]  [phone]  [facility]  [phone]

## 2019-02-20 NOTE — PROGRESS NOTE ADULT - SUBJECTIVE AND OBJECTIVE BOX
CHIEF COMPLAINT:Patient is a 83y old  Male who presents with a chief complaint of hypoglycemia (15 Feb 2019 14:47)    	        PAST MEDICAL & SURGICAL HISTORY:  Lymphoma  Benign neoplasm of colon  Prostate cancer  Stented coronary artery: 2006  MI (myocardial infarction)  CAD (coronary artery disease)  HLD (hyperlipidemia)  GERD (gastroesophageal reflux disease)  Benign prostatic hypertrophy  Diabetes: Type II  HTN (hypertension)  CAD (coronary artery disease): 1 cardiac stent 2006 was on plavix, currently pt is not on aspirin  History of prostate surgery: 2007 seed implants          REVIEW OF SYSTEMS:  CONSTITUTIONAL: feels better  EYES: No eye pain, visual disturbances, or discharge  NECK: No pain or stiffness  RESPIRATORY: No cough, wheezing, chills or hemoptysis; No Shortness of Breath  CARDIOVASCULAR: No chest pain, palpitations, passing out, dizziness, or leg swelling  GASTROINTESTINAL: No abdominal or epigastric pain. No nausea, vomiting, or hematemesis; No diarrhea or constipation. No melena or hematochezia.  GENITOURINARY: No dysuria, frequency, hematuria, or incontinence  NEUROLOGICAL: No headaches,     Medications:  MEDICATIONS  (STANDING):  ATENolol  Tablet 50 milliGRAM(s) Oral daily  dextrose 5%. 1000 milliLiter(s) (50 mL/Hr) IV Continuous <Continuous>  dextrose 50% Injectable 12.5 Gram(s) IV Push once  dextrose 50% Injectable 25 Gram(s) IV Push once  dextrose 50% Injectable 25 Gram(s) IV Push once  enoxaparin Injectable 40 milliGRAM(s) SubCutaneous daily  famotidine    Tablet 20 milliGRAM(s) Oral two times a day  furosemide    Tablet 40 milliGRAM(s) Oral daily  insulin lispro (HumaLOG) corrective regimen sliding scale   SubCutaneous three times a day before meals  insulin lispro (HumaLOG) corrective regimen sliding scale   SubCutaneous at bedtime  magnesium sulfate  IVPB 2 Gram(s) IV Intermittent once  tamsulosin 0.8 milliGRAM(s) Oral at bedtime    MEDICATIONS  (PRN):  dextrose 40% Gel 15 Gram(s) Oral once PRN Blood Glucose LESS THAN 70 milliGRAM(s)/deciliter  glucagon  Injectable 1 milliGRAM(s) IntraMuscular once PRN Glucose LESS THAN 70 milligrams/deciliter  oxyCODONE    IR 5 milliGRAM(s) Oral every 4 hours PRN Moderate Pain (4 - 6)    	    PHYSICAL EXAM:  T(C): 36.8 (02-19-19 @ 23:23), Max: 36.8 (02-19-19 @ 23:23)  HR: 78 (02-19-19 @ 23:23) (61 - 78)  BP: 100/51 (02-19-19 @ 23:23) (100/51 - 124/74)  RR: 18 (02-19-19 @ 23:23) (16 - 18)  SpO2: 99% (02-19-19 @ 23:23) (99% - 100%)  Wt(kg): --  I&O's Summary    19 Feb 2019 07:01  -  20 Feb 2019 07:00  --------------------------------------------------------  IN: 626 mL / OUT: 1395 mL / NET: -769 mL        Appearance: Normal	  HEENT:   Normal oral mucosa, PERRL, EOMI	  Lymphatic: No lymphadenopathy  Cardiovascular: Normal S1 S2, No JVD, No murmurs, No edema  Respiratory: Lungs clear to auscultation	  Psychiatry: A & O   Gastrointestinal:  Soft, Non-tender, + BS	ostomy  Skin: No rashes, No ecchymoses, No cyanosis	  Neurologic: Non-focal  Extremities: Normal range of motion, No clubbing, cyanosis or edema  Vascular: Peripheral pulses palpable 2+ bilaterally    TELEMETRY: 	    ECG:  	  RADIOLOGY:  OTHER: 	  	  LABS:	 	    CARDIAC MARKERS:                                10.2   6.32  )-----------( 304      ( 20 Feb 2019 05:26 )             33.8     02-20    139  |  105  |  6<L>  ----------------------------<  210<H>  4.3   |  24  |  0.55    Ca    8.2<L>      20 Feb 2019 05:26  Phos  2.8     02-20  Mg     1.7     02-20      proBNP:   Lipid Profile:   HgA1c:   TSH:

## 2019-02-20 NOTE — DISCHARGE NOTE ADULT - CARE PROVIDERS DIRECT ADDRESSES
[email or web address]

## 2019-02-25 LAB — SURGICAL PATHOLOGY STUDY: SIGNIFICANT CHANGE UP

## 2019-03-04 ENCOUNTER — APPOINTMENT (OUTPATIENT)
Dept: SURGICAL ONCOLOGY | Facility: CLINIC | Age: 84
End: 2019-03-04
Payer: MEDICARE

## 2019-03-07 ENCOUNTER — APPOINTMENT (OUTPATIENT)
Dept: SURGICAL ONCOLOGY | Facility: CLINIC | Age: 84
End: 2019-03-07
Payer: MEDICARE

## 2019-03-07 VITALS
WEIGHT: 172 LBS | HEART RATE: 56 BPM | HEIGHT: 67 IN | OXYGEN SATURATION: 100 % | BODY MASS INDEX: 27 KG/M2 | SYSTOLIC BLOOD PRESSURE: 119 MMHG | RESPIRATION RATE: 116 BRPM | DIASTOLIC BLOOD PRESSURE: 66 MMHG

## 2019-03-07 PROCEDURE — 99024 POSTOP FOLLOW-UP VISIT: CPT

## 2019-03-07 NOTE — REASON FOR VISIT
[FreeTextEntry2] : s/p Ex lap, small bowel resection, Right colectomy with end ileostomy on 2/15/2019.

## 2019-03-07 NOTE — PHYSICAL EXAM
[Normal] : well developed, well nourished, in no acute distress [de-identified] : Mid abdominal incision healing well without signs of infection.  Staples removed and retention sutures remains intact.  RUQ ostomy functioning well with pink protuberant stoma.  Abd ND and no palpable masses.

## 2019-03-07 NOTE — HISTORY OF PRESENT ILLNESS
[de-identified] : Mr. Alvarado is a 83 year old male who presents for their initial post-op visit.  She is s/p ex lap, small bowel resection, Right colectomy with end ileostomy on 2/15/2019.  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  Retroperitoneal LN positive marginal cell lymphoma.\par \par Today he feels generally well with some minor abdominal discomfort.  Having daily soft BMs via ostomy denying bloody output.  Daughter states bag changed approximately 3x daily.  As per daughter patient is eating very well and has more energy.\par \par \par Pertinent history:\par Initial consultation on 10/22/18 (following a recent admission to [facility]).  His history is significant for prostate cancer (s/p rads, seeding), BPH, GERD, DM, CAD s/p stent (2006- currently OFF plavix).  He presented to [facility] on 10/3/18 with abdominal pain x 3 months and a 30 pound weight loss according to his daughter.  He underwent a CT abd on 10/3/18 which revealed a 2.6x2.0cm cecal mass with adjacent adenopathy concerning for metastasis.  On 10/5/18 he had a colonoscopy at which time the mass was biopsied  with final pathology of inflamed tubular adenoma.  He underwent a CT chest on 10/19/18 which revealed supraclavicular, axillary and retroperitoneal adenopathy.\par \par I referred him for a PET/CT which was done on 10/25/18.  Study demonstrated a cecal mass with heterogeneous FDG activity, and mildly FDG avid adjacent mesenteric lymphadenopathy.  There is minimal FDG avid bilateral nodular adrenal thickening stated to be probably benign.  There are non specific, non FDG avid retroperitoneal, cervical lymph and axillary lymph nodes nodes as well. There is no mediastinal lymphadenopathy.   He underwent a Right axillary LN biopsy which was positive for malignancy.  He has since met with Heme/Onc Dr. 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 Mando Arshad\par Referring: Dr. Zofia Duckworth

## 2019-03-07 NOTE — CONSULT LETTER
[Dear  ___] : Dear ~CLIFTON, [Consult Letter:] : I had the pleasure of evaluating your patient, [unfilled]. [Please see my note below.] : Please see my note below. [Consult Closing:] : Thank you very much for allowing me to participate in the care of this patient.  If you have any questions, please do not hesitate to contact me. [Sincerely,] : Sincerely, [FreeTextEntry2] : Zofia Duckworth MD  [FreeTextEntry1] : Mr. Alvarado is a 83 year old male who presents for their initial post-op visit.  She is s/p ex lap, small bowel resection, right colectomy with end ileostomy on 2/15/2019.  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  Retroperitoneal LN positive marginal cell lymphoma.\par \par Today he feels generally well with some minor abdominal discomfort.  Having daily soft BMs via ostomy denying bloody output.  Daughter states bag changed approximately 3x daily.  As per daughter patient is eating very well and has more energy.\par \par On exam, the incision is healing well.  Staples are removed.  Ostomy is functioning.\par \par Mr. Alvarado is making an appropriate recovery thus far.  He will followup with us in 3 weeks. I will remove the retention sutures at that time. We also discussed following up with medical oncology to discuss any chemotherapy options as well for the colon cancer and the lymphoma. [FreeTextEntry3] : Melo Leon MD\par Surgical Oncology\par [facility]\par Office: [phone]\par Cell: [phone]\par  [DrMayur  ___] : Dr. DEGROOT

## 2019-03-07 NOTE — ASSESSMENT
[FreeTextEntry1] : Mr. Alvarado is making an appropriate recovery thus far.  He will followup with us in 3 weeks. I will remove the retention sutures at that time. We also discussed following up with medical oncology to discuss any chemotherapy options as well for the colon cancer and the lymphoma.

## 2019-03-22 NOTE — H&P ADULT - NSHPLABSRESULTS_GEN_ALL_CORE
Additional Safety/Bands:
9.1    5.57  )-----------( 292      ( 03 Oct 2018 16:00 )             29.4       10-03    138  |  97<L>  |  23  ----------------------------<  85  4.1   |  25  |  1.24    Ca    9.4      03 Oct 2018 16:00    TPro  7.0  /  Alb  3.6  /  TBili  0.4  /  DBili  x   /  AST  13  /  ALT  7   /  AlkPhos  55  10-03    Occult Blood, Feces (10.03.18 @ 18:12)    Occult Blood: POSITIVE: ** Results**    Positive Control = Positive  Negative Control = Negative      < from: CT Abdomen and Pelvis w/ Oral Cont and w/ IV Cont (10.03.18 @ 20:14) >      LOWER CHEST: Within normal limits.    LIVER: Small focus of hyperattenuation along the falciform ligament,   likely focal fatty infiltration.  BILE DUCTS: Normal caliber.  GALLBLADDER: Within normal limits.  SPLEEN: Within normal limits.  PANCREAS: Within normal limits.  ADRENALS: Within normal limits.  KIDNEYS/URETERS: Subcentimeter hypodense focus in the left upper pole,   too small to characterize. No hydronephrosis. Symmetric enhancement.     BLADDER: Within normal limits.  REPRODUCTIVE ORGANS: Prostatic fiducial markers/radiation seeds.    BOWEL: Cecal mural thickening and pericecal inflammatory changes with   adjacent mesenteric lymphadenopathy. Reference right common iliac lymph   node measures 2.6 x 2.0 cm (series 2 image 68). No bowel obstruction.   PERITONEUM: No ascites.  VESSELS:  Atherosclerosis.  RETROPERITONEUM: Paracaval lymphadenopathy.   ABDOMINAL WALL: Within normal limits.  BONES: Multilevel spinal degenerative changes.    IMPRESSION:   Cecal mass. Adjacent adenopathy concerning for metastasis.                  JAMIL BLOOM M.D., RADIOLOGY RESIDENT  This document has been electronically signed.  ANNA RITTER M.D., RADIOLOGIST  This document has been electronically signed. Oct  3 2018  8:44PM          < end of copied text >

## 2019-03-25 ENCOUNTER — OUTPATIENT (OUTPATIENT)
Dept: OUTPATIENT SERVICES | Facility: HOSPITAL | Age: 84
LOS: 1 days | Discharge: HOME | End: 2019-03-25

## 2019-03-25 DIAGNOSIS — I25.10 ATHEROSCLEROTIC HEART DISEASE OF NATIVE CORONARY ARTERY WITHOUT ANGINA PECTORIS: Chronic | ICD-10-CM

## 2019-03-25 DIAGNOSIS — Z98.890 OTHER SPECIFIED POSTPROCEDURAL STATES: Chronic | ICD-10-CM

## 2019-03-25 DIAGNOSIS — C85.10 UNSPECIFIED B-CELL LYMPHOMA, UNSPECIFIED SITE: ICD-10-CM

## 2019-03-28 ENCOUNTER — APPOINTMENT (OUTPATIENT)
Age: 84
End: 2019-03-28
Payer: MEDICARE

## 2019-03-28 VITALS
DIASTOLIC BLOOD PRESSURE: 65 MMHG | SYSTOLIC BLOOD PRESSURE: 101 MMHG | HEIGHT: 67 IN | OXYGEN SATURATION: 100 % | TEMPERATURE: 98.3 F | HEART RATE: 60 BPM | RESPIRATION RATE: 16 BRPM | BODY MASS INDEX: 21.97 KG/M2 | WEIGHT: 140 LBS

## 2019-03-28 PROCEDURE — 99024 POSTOP FOLLOW-UP VISIT: CPT

## 2019-03-28 NOTE — HISTORY OF PRESENT ILLNESS
[de-identified] : Mr. Alvarado is a 83 year old male who returns for postop care.  He is s/p ex lap, small bowel resection, Right colectomy with end ileostomy on 2/15/2019.  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  Retroperitoneal LN positive marginal cell lymphoma.\par \par He reports discomfort associated with the retention sutures but denies abdominal pain, fever or chills.  His appetite is good but he is hesitant to eat at times because he is concerned about increased ileostomy output.  His ostomy is functioning with primarily pasty/soft stools.  He denies excessive watery diarrhea.\par \par He is scheduled to consult with Dr. Argueta next week. \par \par Pertinent history:\par Initial consultation on 10/22/18 (following a recent admission to [facility]  His history is significant for prostate cancer (s/p rads, seeding), BPH, GERD, DM, CAD s/p stent (2006- currently OFF plavix).  He presented to [facility] on 10/3/18 with abdominal pain x 3 months and a 30 pound weight loss according to his daughter.  He underwent a CT abd on 10/3/18 which revealed a 2.6x2.0cm cecal mass with adjacent adenopathy concerning for metastasis.  On 10/5/18 he had a colonoscopy at which time the mass was biopsied  with final pathology of inflamed tubular adenoma.  He underwent a CT chest on 10/19/18 which revealed supraclavicular, axillary and retroperitoneal adenopathy.\par \par I referred him for a PET/CT which was done on 10/25/18.  Study demonstrated a cecal mass with heterogeneous FDG activity, and mildly FDG avid adjacent mesenteric lymphadenopathy.  There is minimal FDG avid bilateral nodular adrenal thickening stated to be probably benign.  There are non specific, non FDG avid retroperitoneal, cervical lymph and axillary lymph nodes nodes as well. There is no mediastinal lymphadenopathy.   He underwent a Right axillary LN biopsy which was positive for malignancy.  He has since met with Heme/Onc Dr. 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 Mando Arshad\par Referring: Dr. Zofia Duckworth

## 2019-03-28 NOTE — PHYSICAL EXAM
[Normal] : well developed, well nourished, in no acute distress [de-identified] : Mid abdominal incision healing well without signs of infection.  Retention sutures remains intact.  RUQ ostomy functioning well with pink protuberant stoma.  Abd ND and no palpable masses.

## 2019-03-28 NOTE — CONSULT LETTER
[Dear  ___] : Dear ~CLIFTON, [Consult Letter:] : I had the pleasure of evaluating your patient, [unfilled]. [Please see my note below.] : Please see my note below. [Consult Closing:] : Thank you very much for allowing me to participate in the care of this patient.  If you have any questions, please do not hesitate to contact me. [Sincerely,] : Sincerely, [DrMayur  ___] : Dr. DEGROOT [FreeTextEntry2] : Zofia Duckworth MD  [FreeTextEntry1] : Mr. Alvarado is a 83 year old male who returns for postop care.  He is s/p ex lap, small bowel resection, Right colectomy with end ileostomy on 2/15/2019.  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  Retroperitoneal LN positive marginal cell lymphoma.\par \par He reports discomfort associated with the retention sutures but denies abdominal pain, fever or chills.  His appetite is good but he is hesitant to eat at times because he is concerned about increased ileostomy output.  His ostomy is functioning with primarily pasty/soft stools.  He denies excessive watery diarrhea.\par \par He is scheduled to consult with Dr. Argueta next week. \par \par On exam, there is some fibrinous exudate around his retention sutures. I removed the retention sutures today and applied a dry sterile dressing.\par \par Mr. Alvarado will follow up with medical oncology next week. He will continue with local wound care to his abdominal wall. [FreeTextEntry3] : Melo Leon MD\par Surgical Oncology\par [facility]\par Office: [phone]\par Cell: [phone]\par

## 2019-03-28 NOTE — ASSESSMENT
[FreeTextEntry1] : Mr. Alvarado will follow up with medical oncology next week. He will continue with local wound care to his abdominal wall.

## 2019-03-28 NOTE — REASON FOR VISIT
[Post-Op] : a post-op for [FreeTextEntry2] : s/p Ex lap, small bowel resection, Right colectomy with end ileostomy on 2/15/2019.

## 2019-04-01 ENCOUNTER — RESULT REVIEW (OUTPATIENT)
Age: 84
End: 2019-04-01

## 2019-04-01 ENCOUNTER — APPOINTMENT (OUTPATIENT)
Dept: HEMATOLOGY ONCOLOGY | Facility: CLINIC | Age: 84
End: 2019-04-01
Payer: MEDICARE

## 2019-04-01 VITALS
RESPIRATION RATE: 14 BRPM | DIASTOLIC BLOOD PRESSURE: 66 MMHG | OXYGEN SATURATION: 96 % | SYSTOLIC BLOOD PRESSURE: 111 MMHG | TEMPERATURE: 97.5 F | HEART RATE: 60 BPM

## 2019-04-01 DIAGNOSIS — C85.90 NON-HODGKIN LYMPHOMA, UNSPECIFIED, UNSPECIFIED SITE: ICD-10-CM

## 2019-04-01 LAB
ALBUMIN SERPL ELPH-MCNC: 3.7 G/DL
ALP BLD-CCNC: 81 U/L
ALT SERPL-CCNC: 12 U/L
ANION GAP SERPL CALC-SCNC: 12 MMOL/L
AST SERPL-CCNC: 11 U/L
BASOPHILS # BLD AUTO: 0.1 K/UL — SIGNIFICANT CHANGE UP (ref 0–0.2)
BASOPHILS NFR BLD AUTO: 1.1 % — SIGNIFICANT CHANGE UP (ref 0–2)
BILIRUB SERPL-MCNC: <0.2 MG/DL
BUN SERPL-MCNC: 18 MG/DL
CALCIUM SERPL-MCNC: 9.8 MG/DL
CEA SERPL-MCNC: 1.8 NG/ML
CHLORIDE SERPL-SCNC: 105 MMOL/L
CO2 SERPL-SCNC: 24 MMOL/L
CREAT SERPL-MCNC: 0.53 MG/DL
EOSINOPHIL # BLD AUTO: 0.1 K/UL — SIGNIFICANT CHANGE UP (ref 0–0.5)
EOSINOPHIL NFR BLD AUTO: 1.7 % — SIGNIFICANT CHANGE UP (ref 0–6)
FERRITIN SERPL-MCNC: 241 NG/ML
GLUCOSE SERPL-MCNC: 77 MG/DL
HCT VFR BLD CALC: 37.2 % — LOW (ref 39–50)
HGB BLD-MCNC: 12.2 G/DL — LOW (ref 13–17)
IRON SATN MFR SERPL: 21 %
IRON SERPL-MCNC: 64 UG/DL
LYMPHOCYTES # BLD AUTO: 4.2 K/UL — HIGH (ref 1–3.3)
LYMPHOCYTES # BLD AUTO: 60.1 % — HIGH (ref 13–44)
MCHC RBC-ENTMCNC: 26.8 PG — LOW (ref 27–34)
MCHC RBC-ENTMCNC: 32.9 G/DL — SIGNIFICANT CHANGE UP (ref 32–36)
MCV RBC AUTO: 81.6 FL — SIGNIFICANT CHANGE UP (ref 80–100)
MONOCYTES # BLD AUTO: 0.6 K/UL — SIGNIFICANT CHANGE UP (ref 0–0.9)
MONOCYTES NFR BLD AUTO: 9.2 % — SIGNIFICANT CHANGE UP (ref 2–14)
NEUTROPHILS # BLD AUTO: 1.9 K/UL — SIGNIFICANT CHANGE UP (ref 1.8–7.4)
NEUTROPHILS NFR BLD AUTO: 27.9 % — LOW (ref 43–77)
PLATELET # BLD AUTO: 335 K/UL — SIGNIFICANT CHANGE UP (ref 150–400)
POTASSIUM SERPL-SCNC: 5.3 MMOL/L
PROT SERPL-MCNC: 6.6 G/DL
RBC # BLD: 4.56 M/UL — SIGNIFICANT CHANGE UP (ref 4.2–5.8)
RBC # FLD: 14.9 % — HIGH (ref 10.3–14.5)
SODIUM SERPL-SCNC: 141 MMOL/L
TIBC SERPL-MCNC: 311 UG/DL
UIBC SERPL-MCNC: 247 UG/DL
WBC # BLD: 7 K/UL — SIGNIFICANT CHANGE UP (ref 3.8–10.5)
WBC # FLD AUTO: 7 K/UL — SIGNIFICANT CHANGE UP (ref 3.8–10.5)

## 2019-04-01 PROCEDURE — 99215 OFFICE O/P EST HI 40 MIN: CPT

## 2019-04-02 LAB
HAV IGM SER QL: NONREACTIVE
HBV CORE IGM SER QL: NONREACTIVE
HBV SURFACE AG SER QL: NONREACTIVE
HCV AB SER QL: NONREACTIVE
HCV S/CO RATIO: 0.13 S/CO

## 2019-04-04 PROBLEM — C85.90 NON HODGKIN'S LYMPHOMA: Status: ACTIVE | Noted: 2018-12-10

## 2019-04-04 RX ORDER — ATORVASTATIN CALCIUM 40 MG/1
40 TABLET, FILM COATED ORAL DAILY
Qty: 90 | Refills: 0 | Status: DISCONTINUED | COMMUNITY
Start: 2017-06-16 | End: 2019-04-04

## 2019-04-04 RX ORDER — OXYCODONE 5 MG/1
5 TABLET ORAL
Qty: 30 | Refills: 0 | Status: DISCONTINUED | COMMUNITY
Start: 2018-11-08 | End: 2019-04-04

## 2019-04-04 RX ORDER — METFORMIN HYDROCHLORIDE 1000 MG/1
1000 TABLET, COATED ORAL
Qty: 180 | Refills: 1 | Status: DISCONTINUED | COMMUNITY
Start: 2017-05-09 | End: 2019-04-04

## 2019-04-04 RX ORDER — NEOMYCIN SULFATE 500 MG/1
500 TABLET ORAL
Qty: 6 | Refills: 0 | Status: DISCONTINUED | COMMUNITY
Start: 2019-01-29 | End: 2019-04-04

## 2019-04-04 RX ORDER — POLYETHYLENE GLYCOL 3350, SODIUM SULFATE, SODIUM CHLORIDE, POTASSIUM CHLORIDE, ASCORBIC ACID, SODIUM ASCORBATE 7.5-2.691G
100 KIT ORAL
Qty: 1 | Refills: 0 | Status: DISCONTINUED | COMMUNITY
Start: 2019-01-29 | End: 2019-04-04

## 2019-04-04 RX ORDER — METRONIDAZOLE 250 MG/1
250 TABLET ORAL
Qty: 3 | Refills: 0 | Status: DISCONTINUED | COMMUNITY
Start: 2019-01-29 | End: 2019-04-04

## 2019-04-04 RX ORDER — POTASSIUM CHLORIDE 1500 MG/1
20 TABLET, FILM COATED, EXTENDED RELEASE ORAL AS DIRECTED
Qty: 4 | Refills: 0 | Status: DISCONTINUED | COMMUNITY
Start: 2019-01-29 | End: 2019-04-04

## 2019-04-05 NOTE — PHYSICAL EXAM
[Capable of only limited self care, confined to bed or chair more than 50% of waking hours] : Status 3- Capable of only limited self care, confined to bed or chair more than 50% of waking hours [Thin] : thin [Normal] : affect appropriate [de-identified] : elderly  [de-identified] : healing incisions  [de-identified] : m

## 2019-04-05 NOTE — REVIEW OF SYSTEMS
[Negative] : Allergic/Immunologic [Recent Change In Weight] : ~T recent weight change [SOB on Exertion] : shortness of breath during exertion [Skin Wound] : skin wound [Difficulty Walking] : difficulty walking [Chest Pain] : no chest pain

## 2019-04-05 NOTE — REASON FOR VISIT
[Initial Consultation] : an initial consultation [Spouse] : spouse [Family Member] : family member [FreeTextEntry2] : Cecal Cancer

## 2019-04-05 NOTE — HISTORY OF PRESENT ILLNESS
[Date: ____________] : Patient's last distress assessment performed on [unfilled]. [5 - Distress Level] : Distress Level: 5 [Disease: _____________________] : Disease: [unfilled] [T: ___] : T[unfilled] [N: ___] : N[unfilled] [M: ___] : M[unfilled] [AJCC Stage: ____] : AJCC Stage: [unfilled] [de-identified] : 85 y/o Male with PMH of BPH, GERD, HLD, DM, CAD, prostate CA (s/p radiation seeds 11/2007 @ [facility]), CAD s/p stent in 2006 who presented to [facility] from 10/3-10/5/18 with abdominal pain, dysphagia and melena when a CT scan confirmed cecal mass.  Patient had EGD/colonoscopy by Dr Zofia Duckworth on 10/22/19 which showed dilated esophagus (biopsy negative for malignancy), 3 cm grade III hiatal hernia, with one nonbleeding gastric ulcer (H pylori + and chronic gastritis), nonbleeding internal hemorrhoids and cecal mass (tubular adenoma) and CT chest 10/19/18 which showed supraclavicular, axillary and RP LAD.  Patient was sent for PET scan on 10/25/19 that showed cecal mass adjacent mesenteric LAD, b/l nodular adrenal thickening, questionably benign RP LAD, cervical LN, axillary LN.  Patient was set up for biopsy of R axillary LN on 11/27/19 which confirmed low grade B cell lymphoma which is being followed by Dr Mercy Wooten.  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  Patient was discharged to [facility] rehab center and discharged home 2 weeks ago.  He presents to us on 4/1/19 to discuss adjuvant therapy. \par \par FHx:  denies malignancies \par Social:  no smoking, no a/c, no drugs, lives with family; retired psych technician from [employer]\par \par PMD:  Dr Mando Arshad\par GI Dr Zofia Duckworth  [de-identified] : 9 x 7 x 3 cm high grade metastatic adenocarcinoma in cecum with tumor invasion into smal bowel with 5/20 LN, negative margins, RP LN, positive marginal cell lymphoma, positive LV invasion, negative PN invasion, poorly differentiated (Grade 3) and MS stable. [de-identified] : CEA [FreeTextEntry1] : Resection 2/15/19 [de-identified] : Alberto comes in for initial consultation with spouse, dtr and son in wheelchair.  He is dependent on others for most ADLs but is able to feed himself and partially dress himself.  He has recently started walking 3-4 yards with walker with PT but admits to [facility].  He is getting wound care 3 times a week due to a bed sore that developed while he was at the rehab center.

## 2019-04-05 NOTE — ASSESSMENT
[Palliative Care Plan] : not applicable at this time [Designated Health Care Proxy] : Designated Health Care Proxy [Name: ___] : Name: [unfilled] [Relationship: ___] : Relationship: [unfilled] [AdvancecareDate] : 04/01/19

## 2019-04-18 ENCOUNTER — APPOINTMENT (OUTPATIENT)
Dept: SURGICAL ONCOLOGY | Facility: CLINIC | Age: 84
End: 2019-04-18
Payer: MEDICARE

## 2019-04-18 VITALS
OXYGEN SATURATION: 98 % | DIASTOLIC BLOOD PRESSURE: 55 MMHG | BODY MASS INDEX: 22.76 KG/M2 | WEIGHT: 145 LBS | HEART RATE: 57 BPM | SYSTOLIC BLOOD PRESSURE: 89 MMHG | HEIGHT: 67 IN

## 2019-04-18 VITALS — WEIGHT: 140 LBS | BODY MASS INDEX: 21.93 KG/M2

## 2019-04-18 PROCEDURE — 99024 POSTOP FOLLOW-UP VISIT: CPT

## 2019-04-18 RX ORDER — LOPERAMIDE HYDROCHLORIDE 2 MG/1
2 CAPSULE ORAL
Qty: 60 | Refills: 0 | Status: ACTIVE | COMMUNITY
Start: 2019-04-18 | End: 1900-01-01

## 2019-04-18 NOTE — HISTORY OF PRESENT ILLNESS
[de-identified] : Mr. Alvarado is a 83 year old male who returns for postop care.  He is s/p ex lap, small bowel resection, Right colectomy with end ileostomy on 2/15/2019.  Final path revealed a 2o5s4yc high grade metastatic adenocarcinoma in the cecum ([id number]) with 5/20 positive LNs, invasion into small bowel and negative margins.  Retroperitoneal LN positive marginal cell lymphoma.\par \par He consulted with Dr. Argueta who will hold off on chemotherapy for now and give him the opportunity to recover from surgery and improve his performance status.  He will repeat a PET/CT in one month. \par \par He has been eating well but he is unable to stand for us to check his weight today.  His daughter feels that his weight appears stable.  His ostomy is functioning with liquid to semi formed stools depending on what he eats.  His daughter is emptying the appliance about 5 times per day.  He denies abdominal pain, fever or chills.  \par \par Pertinent history:\par Initial consultation on 10/22/18 (following a recent admission to [facility]).  His history is significant for prostate cancer (s/p rads, seeding), BPH, GERD, DM, CAD s/p stent (2006- currently OFF plavix).  He presented to [facility] on 10/3/18 with abdominal pain x 3 months and a 30 pound weight loss according to his daughter.  He underwent a CT abd on 10/3/18 which revealed a 2.6x2.0cm cecal mass with adjacent adenopathy concerning for metastasis.  On 10/5/18 he had a colonoscopy at which time the mass was biopsied  with final pathology of inflamed tubular adenoma.  He underwent a CT chest on 10/19/18 which revealed supraclavicular, axillary and retroperitoneal adenopathy.\par \par I referred him for a PET/CT which was done on 10/25/18.  Study demonstrated a cecal mass with heterogeneous FDG activity, and mildly FDG avid adjacent mesenteric lymphadenopathy.  There is minimal FDG avid bilateral nodular adrenal thickening stated to be probably benign.  There are non specific, non FDG avid retroperitoneal, cervical lymph and axillary lymph nodes nodes as well. There is no mediastinal lymphadenopathy.   He underwent a Right axillary LN biopsy which was positive for malignancy.  He has since met with Heme/Onc Dr. 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 Mando Arshad\par Referring: Dr. Zofia Duckworth

## 2019-04-18 NOTE — ASSESSMENT
[FreeTextEntry1] : Mr. Alvarado will follow up in 2 months.  He will continue with medical oncology evaluation for possible adjuvant therapy.  He will trial Immodium to thicken his ostomy output.

## 2019-04-18 NOTE — CONSULT LETTER
[Dear  ___] : Dear ~CLIFTON, [Consult Letter:] : I had the pleasure of evaluating your patient, [unfilled]. [Please see my note below.] : Please see my note below. [Consult Closing:] : Thank you very much for allowing me to participate in the care of this patient.  If you have any questions, please do not hesitate to contact me. [Sincerely,] : Sincerely, [DrMayur  ___] : Dr. DEGROOT [FreeTextEntry2] : Zofia Duckworth MD  [FreeTextEntry1] : \par Mr. Alvarado is a 83 year old male who returns for postop care.  He is s/p ex lap, small bowel resection, Right colectomy with end ileostomy on 2/15/2019.  Final path revealed a 6l2q6yf high grade metastatic adenocarcinoma in the cecum ([id number]) with 5/20 positive LNs, invasion into small bowel and negative margins.  Retroperitoneal LN positive marginal cell lymphoma.\par \par He consulted with Dr. Argueta who will hold off on chemotherapy for now and give him the opportunity to recover from surgery and improve his performance status.  He will repeat a PET/CT in one month. \par \par He has been eating well but he is unable to stand for us to check his weight today.  His daughter feels that his weight appears stable.  His ostomy is functioning with liquid to semi formed stools depending on what he eats.  His daughter is emptying the appliance about 5 times per day.  He denies abdominal pain, fever or chills.  \par \par On exam, the incision is well healed.  Ostomy is functioning\par \par Mr. Alvarado will follow up in 2 months.  He will continue with medical oncology evaluation for possible adjuvant therapy.  He will trial Immodium to thicken his ostomy output. [FreeTextEntry3] : Melo Leon MD\par Surgical Oncology\par [facility]\par Office: [phone]\par Cell: [phone]\par  [DrMayur ___] : Dr. DEGROOT

## 2019-04-18 NOTE — PHYSICAL EXAM
[de-identified] : Midline incision healing well without evidence of infection.  Ileostomy with watery stool in appliance and viable stoma.

## 2019-04-30 ENCOUNTER — FORM ENCOUNTER (OUTPATIENT)
Age: 84
End: 2019-04-30

## 2019-05-01 ENCOUNTER — APPOINTMENT (OUTPATIENT)
Dept: NUCLEAR MEDICINE | Facility: IMAGING CENTER | Age: 84
End: 2019-05-01
Payer: MEDICARE

## 2019-05-01 ENCOUNTER — OUTPATIENT (OUTPATIENT)
Dept: OUTPATIENT SERVICES | Facility: HOSPITAL | Age: 84
LOS: 1 days | End: 2019-05-01
Payer: MEDICARE

## 2019-05-01 DIAGNOSIS — C85.90 NON-HODGKIN LYMPHOMA, UNSPECIFIED, UNSPECIFIED SITE: ICD-10-CM

## 2019-05-01 DIAGNOSIS — I25.10 ATHEROSCLEROTIC HEART DISEASE OF NATIVE CORONARY ARTERY WITHOUT ANGINA PECTORIS: Chronic | ICD-10-CM

## 2019-05-01 DIAGNOSIS — Z98.890 OTHER SPECIFIED POSTPROCEDURAL STATES: Chronic | ICD-10-CM

## 2019-05-01 PROCEDURE — A9552: CPT

## 2019-05-01 PROCEDURE — 78815 PET IMAGE W/CT SKULL-THIGH: CPT

## 2019-05-01 PROCEDURE — 78815 PET IMAGE W/CT SKULL-THIGH: CPT | Mod: 26,PS

## 2019-05-08 ENCOUNTER — OUTPATIENT (OUTPATIENT)
Dept: OUTPATIENT SERVICES | Facility: HOSPITAL | Age: 84
LOS: 1 days | Discharge: HOME | End: 2019-05-08

## 2019-05-08 DIAGNOSIS — I25.10 ATHEROSCLEROTIC HEART DISEASE OF NATIVE CORONARY ARTERY WITHOUT ANGINA PECTORIS: Chronic | ICD-10-CM

## 2019-05-08 DIAGNOSIS — C85.10 UNSPECIFIED B-CELL LYMPHOMA, UNSPECIFIED SITE: ICD-10-CM

## 2019-05-08 DIAGNOSIS — Z98.890 OTHER SPECIFIED POSTPROCEDURAL STATES: Chronic | ICD-10-CM

## 2019-05-10 ENCOUNTER — APPOINTMENT (OUTPATIENT)
Dept: HEMATOLOGY ONCOLOGY | Facility: CLINIC | Age: 84
End: 2019-05-10
Payer: MEDICARE

## 2019-05-10 VITALS
RESPIRATION RATE: 14 BRPM | SYSTOLIC BLOOD PRESSURE: 110 MMHG | OXYGEN SATURATION: 96 % | HEART RATE: 60 BPM | TEMPERATURE: 98.2 F | DIASTOLIC BLOOD PRESSURE: 70 MMHG

## 2019-05-10 PROCEDURE — 99214 OFFICE O/P EST MOD 30 MIN: CPT

## 2019-05-13 NOTE — ED PROVIDER NOTE - DISPOSITION TYPE
[facility] - Sabien / Shruti S /Margot/ SCOUT Mahmood/ S Benz/ Ori Guzman / ANALISA Njeru  ---------------------------------------------------------------------------------------------------------------    Patient seen and examined bedside    Subjective and Objective: No overnight events. denied sob. offers no complaints today. +loose stools  wife bedside. no u/o.     Allergies: oxycodone (Vomiting; Nausea)      Hospital Medications:   MEDICATIONS  (STANDING):  acyclovir IVPB 200 milliGRAM(s) IV Intermittent every 24 hours  carvedilol 25 milliGRAM(s) Oral every 12 hours  chlorhexidine 4% Liquid 1 Application(s) Topical <User Schedule>  docosanol 10% Cream 1 Application(s) Topical three times a day  fluconAZOLE IVPB 200 milliGRAM(s) IV Intermittent every 24 hours  metoclopramide Injectable 5 milliGRAM(s) IV Push every 8 hours  pantoprazole    Tablet 40 milliGRAM(s) Oral before breakfast  vancomycin    Solution 125 milliGRAM(s) Oral every 6 hours      VITALS:  T(F): 102.3 (05-13-19 @ 11:06), Max: 102.3 (05-13-19 @ 11:06)  HR: 116 (05-13-19 @ 11:15)  BP: 144/73 (05-13-19 @ 11:15)  RR: 22 (05-13-19 @ 11:15)  SpO2: 97% (05-13-19 @ 11:15)  Wt(kg): --    PHYSICAL EXAM:  Constitutional: NAD  HEENT: anicteric sclera  Neck: No JVD  Respiratory: CTAB, no wheezes, rales or rhonchi  Cardiovascular: S1, S2, RRR  Gastrointestinal: BS+, soft, NT/ND  Extremities: No cyanosis or clubbing. No peripheral edema  Neurological: sleeping, arousable  : No CVA tenderness. No quintero.   Vascular Access: rt TEA sheets+    LABS:  05-13    147<H>  |  106  |  119<H>  ----------------------------<  247<H>  4.2   |  17<L>  |  9.58<H>    Ca    7.2<L>      13 May 2019 06:12  Phos  4.9     05-13  Mg     2.5     05-13    TPro  6.4  /  Alb  3.0<L>  /  TBili  1.0  /  DBili      /  AST  26  /  ALT  Test not performed SPECIMAN LIPAMIC  /  AlkPhos  159<H>  05-13    Creatinine Trend: 9.58 <--, 8.22 <--, 6.47 <--, 8.57 <--, 6.61 <--, 4.71 <--, 9.68 <--, 7.77 <--                        7.6    3.98  )-----------( 28       ( 13 May 2019 06:12 )             21.7     Urine Studies:        RADIOLOGY & ADDITIONAL STUDIES: DISCHARGE

## 2019-05-15 NOTE — REVIEW OF SYSTEMS
[Fatigue] : fatigue [SOB on Exertion] : shortness of breath during exertion [Skin Wound] : skin wound [Difficulty Walking] : difficulty walking [Negative] : Allergic/Immunologic [Chest Pain] : no chest pain [Abdominal Pain] : no abdominal pain

## 2019-05-15 NOTE — ASSESSMENT
[Palliative Care Plan] : patient was apprised of terminal prognosis of 6 months or less. Referral made to Hospice or Palliative Care Service [Supportive] : Goals of care discussed with patient: Supportive [With Patient/Caregiver] : : With Patient/Caregiver [Designated Health Care Proxy] : Designated Health Care Proxy [DNR] : DNR [Relationship: ___] : Relationship: [unfilled] [DNI] : DNI [Patient viewed ACP (Advance Care Planning) video in clinic] : Patient viewed ACP (Advance Care Planning) video in clinic [AdvancecareDate] : 5/10/19

## 2019-05-15 NOTE — PHYSICAL EXAM
[Capable of only limited self care, confined to bed or chair more than 50% of waking hours] : Status 3- Capable of only limited self care, confined to bed or chair more than 50% of waking hours [Thin] : thin [Normal] : affect appropriate [de-identified] : elderly in wheelchair  [de-identified] : healing incisions

## 2019-05-15 NOTE — HISTORY OF PRESENT ILLNESS
[Disease: _____________________] : Disease: [unfilled] [N: ___] : N[unfilled] [T: ___] : T[unfilled] [M: ___] : M[unfilled] [AJCC Stage: ____] : AJCC Stage: [unfilled] [de-identified] : 83 y/o Male with PMH of BPH, GERD, HLD, DM, CAD, prostate CA (s/p radiation seeds 11/2007 @ [facility]), CAD s/p stent in 2006 who presented to [facility] from 10/3-10/5/18 with abdominal pain, dysphagia and melena when a CT scan confirmed cecal mass.  Patient had EGD/colonoscopy by Dr Zofia Duckworth on 10/22/19 which showed dilated esophagus (biopsy negative for malignancy), 3 cm grade III hiatal hernia, with one nonbleeding gastric ulcer (H pylori + and chronic gastritis), nonbleeding internal hemorrhoids and cecal mass (tubular adenoma) and CT chest 10/19/18 which showed supraclavicular, axillary and RP LAD.  Patient was sent for PET scan on 10/25/19 that showed cecal mass adjacent mesenteric LAD, b/l nodular adrenal thickening, questionably benign RP LAD, cervical LN, axillary LN.  Patient was set up for biopsy of R axillary LN on 11/27/19 which confirmed low grade B cell lymphoma which is being followed by Dr Mercy Wooten.  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  Patient was discharged to [facility] rehab center and discharged home 2 weeks ago.  He presents to us on 4/1/19 to discuss adjuvant therapy. 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 [de-identified] : pet reveals metastatic colon to liver and lymphoma, pt wheelchair bound with decub ulcer spends more than 50% of time in bed \par daughter here with pt to review pet and decide next steps

## 2019-05-15 NOTE — REASON FOR VISIT
[Follow-Up Visit] : a follow-up [Spouse] : spouse [Family Member] : family member [FreeTextEntry2] : Cecal Cancer

## 2019-05-18 ENCOUNTER — INPATIENT (INPATIENT)
Facility: HOSPITAL | Age: 84
LOS: 2 days | Discharge: HOME | DRG: 389 | End: 2019-05-21
Attending: FAMILY MEDICINE | Admitting: SURGERY
Payer: OTHER MISCELLANEOUS

## 2019-05-18 VITALS
TEMPERATURE: 98 F | OXYGEN SATURATION: 100 % | WEIGHT: 102.96 LBS | HEART RATE: 62 BPM | RESPIRATION RATE: 14 BRPM | DIASTOLIC BLOOD PRESSURE: 62 MMHG | HEIGHT: 66 IN | SYSTOLIC BLOOD PRESSURE: 92 MMHG

## 2019-05-18 DIAGNOSIS — Z98.890 OTHER SPECIFIED POSTPROCEDURAL STATES: Chronic | ICD-10-CM

## 2019-05-18 DIAGNOSIS — I25.10 ATHEROSCLEROTIC HEART DISEASE OF NATIVE CORONARY ARTERY WITHOUT ANGINA PECTORIS: Chronic | ICD-10-CM

## 2019-05-18 LAB
ALBUMIN SERPL ELPH-MCNC: 3.7 G/DL — SIGNIFICANT CHANGE UP (ref 3.3–5)
ALP SERPL-CCNC: 53 U/L — SIGNIFICANT CHANGE UP (ref 40–120)
ALT FLD-CCNC: 7 U/L — LOW (ref 10–45)
ANION GAP SERPL CALC-SCNC: 22 MMOL/L — HIGH (ref 5–17)
ANION GAP SERPL CALC-SCNC: 24 MMOL/L — HIGH (ref 5–17)
APTT BLD: 28.5 SEC — SIGNIFICANT CHANGE UP (ref 27.5–36.3)
AST SERPL-CCNC: 11 U/L — SIGNIFICANT CHANGE UP (ref 10–40)
BASE EXCESS BLDV CALC-SCNC: -6.4 MMOL/L — LOW (ref -2–2)
BASE EXCESS BLDV CALC-SCNC: -6.8 MMOL/L — LOW (ref -2–2)
BASOPHILS # BLD AUTO: 0.1 K/UL — SIGNIFICANT CHANGE UP (ref 0–0.2)
BASOPHILS NFR BLD AUTO: 1.4 % — SIGNIFICANT CHANGE UP (ref 0–2)
BILIRUB SERPL-MCNC: 0.4 MG/DL — SIGNIFICANT CHANGE UP (ref 0.2–1.2)
BLD GP AB SCN SERPL QL: NEGATIVE — SIGNIFICANT CHANGE UP
BUN SERPL-MCNC: 54 MG/DL — HIGH (ref 7–23)
BUN SERPL-MCNC: 55 MG/DL — HIGH (ref 7–23)
CA-I SERPL-SCNC: 1.24 MMOL/L — SIGNIFICANT CHANGE UP (ref 1.12–1.3)
CA-I SERPL-SCNC: 1.33 MMOL/L — HIGH (ref 1.12–1.3)
CALCIUM SERPL-MCNC: 10.3 MG/DL — SIGNIFICANT CHANGE UP (ref 8.4–10.5)
CALCIUM SERPL-MCNC: 9.8 MG/DL — SIGNIFICANT CHANGE UP (ref 8.4–10.5)
CHLORIDE BLDV-SCNC: 92 MMOL/L — LOW (ref 96–108)
CHLORIDE BLDV-SCNC: 94 MMOL/L — LOW (ref 96–108)
CHLORIDE SERPL-SCNC: 83 MMOL/L — LOW (ref 96–108)
CHLORIDE SERPL-SCNC: 84 MMOL/L — LOW (ref 96–108)
CO2 BLDV-SCNC: 19 MMOL/L — LOW (ref 22–30)
CO2 BLDV-SCNC: 21 MMOL/L — LOW (ref 22–30)
CO2 SERPL-SCNC: 15 MMOL/L — LOW (ref 22–31)
CO2 SERPL-SCNC: 15 MMOL/L — LOW (ref 22–31)
CREAT SERPL-MCNC: 1.15 MG/DL — SIGNIFICANT CHANGE UP (ref 0.5–1.3)
CREAT SERPL-MCNC: 1.31 MG/DL — HIGH (ref 0.5–1.3)
EOSINOPHIL # BLD AUTO: 0 K/UL — SIGNIFICANT CHANGE UP (ref 0–0.5)
EOSINOPHIL NFR BLD AUTO: 0.3 % — SIGNIFICANT CHANGE UP (ref 0–6)
GAS PNL BLDV: 121 MMOL/L — LOW (ref 136–145)
GAS PNL BLDV: 126 MMOL/L — LOW (ref 136–145)
GAS PNL BLDV: SIGNIFICANT CHANGE UP
GLUCOSE BLDV-MCNC: 207 MG/DL — HIGH (ref 70–99)
GLUCOSE BLDV-MCNC: 207 MG/DL — HIGH (ref 70–99)
GLUCOSE SERPL-MCNC: 206 MG/DL — HIGH (ref 70–99)
GLUCOSE SERPL-MCNC: 218 MG/DL — HIGH (ref 70–99)
HCO3 BLDV-SCNC: 18 MMOL/L — LOW (ref 21–29)
HCO3 BLDV-SCNC: 20 MMOL/L — LOW (ref 21–29)
HCT VFR BLD CALC: 45.3 % — SIGNIFICANT CHANGE UP (ref 39–50)
HCT VFR BLDA CALC: 42 % — SIGNIFICANT CHANGE UP (ref 39–50)
HCT VFR BLDA CALC: 43 % — SIGNIFICANT CHANGE UP (ref 39–50)
HGB BLD CALC-MCNC: 13.5 G/DL — SIGNIFICANT CHANGE UP (ref 13–17)
HGB BLD CALC-MCNC: 14.1 G/DL — SIGNIFICANT CHANGE UP (ref 13–17)
HGB BLD-MCNC: 14.3 G/DL — SIGNIFICANT CHANGE UP (ref 13–17)
INR BLD: 0.99 RATIO — SIGNIFICANT CHANGE UP (ref 0.88–1.16)
LACTATE BLDV-MCNC: 1.7 MMOL/L — SIGNIFICANT CHANGE UP (ref 0.7–2)
LACTATE BLDV-MCNC: 2.8 MMOL/L — HIGH (ref 0.7–2)
LYMPHOCYTES # BLD AUTO: 3.6 K/UL — HIGH (ref 1–3.3)
LYMPHOCYTES # BLD AUTO: 51.3 % — HIGH (ref 13–44)
MCHC RBC-ENTMCNC: 25.7 PG — LOW (ref 27–34)
MCHC RBC-ENTMCNC: 31.5 GM/DL — LOW (ref 32–36)
MCV RBC AUTO: 81.5 FL — SIGNIFICANT CHANGE UP (ref 80–100)
MONOCYTES # BLD AUTO: 0.4 K/UL — SIGNIFICANT CHANGE UP (ref 0–0.9)
MONOCYTES NFR BLD AUTO: 5.8 % — SIGNIFICANT CHANGE UP (ref 2–14)
NEUTROPHILS # BLD AUTO: 2.9 K/UL — SIGNIFICANT CHANGE UP (ref 1.8–7.4)
NEUTROPHILS NFR BLD AUTO: 41.2 % — LOW (ref 43–77)
OTHER CELLS CSF MANUAL: 13 ML/DL — LOW (ref 18–22)
PCO2 BLDV: 35 MMHG — SIGNIFICANT CHANGE UP (ref 35–50)
PCO2 BLDV: 42 MMHG — SIGNIFICANT CHANGE UP (ref 35–50)
PH BLDV: 7.29 — LOW (ref 7.35–7.45)
PH BLDV: 7.33 — LOW (ref 7.35–7.45)
PLAT MORPH BLD: NORMAL — SIGNIFICANT CHANGE UP
PLATELET # BLD AUTO: 212 K/UL — SIGNIFICANT CHANGE UP (ref 150–400)
PO2 BLDV: 25 MMHG — SIGNIFICANT CHANGE UP (ref 25–45)
PO2 BLDV: 42 MMHG — SIGNIFICANT CHANGE UP (ref 25–45)
POTASSIUM BLDV-SCNC: 5.8 MMOL/L — HIGH (ref 3.5–5.3)
POTASSIUM BLDV-SCNC: 7.1 MMOL/L — CRITICAL HIGH (ref 3.5–5.3)
POTASSIUM SERPL-MCNC: 6.2 MMOL/L — CRITICAL HIGH (ref 3.5–5.3)
POTASSIUM SERPL-MCNC: 6.6 MMOL/L — CRITICAL HIGH (ref 3.5–5.3)
POTASSIUM SERPL-SCNC: 6.2 MMOL/L — CRITICAL HIGH (ref 3.5–5.3)
POTASSIUM SERPL-SCNC: 6.6 MMOL/L — CRITICAL HIGH (ref 3.5–5.3)
PROT SERPL-MCNC: 6.9 G/DL — SIGNIFICANT CHANGE UP (ref 6–8.3)
PROTHROM AB SERPL-ACNC: 11.4 SEC — SIGNIFICANT CHANGE UP (ref 10–12.9)
RBC # BLD: 5.56 M/UL — SIGNIFICANT CHANGE UP (ref 4.2–5.8)
RBC # FLD: 14.7 % — HIGH (ref 10.3–14.5)
RBC BLD AUTO: SIGNIFICANT CHANGE UP
RH IG SCN BLD-IMP: POSITIVE — SIGNIFICANT CHANGE UP
SAO2 % BLDV: 30 % — LOW (ref 67–88)
SAO2 % BLDV: 67 % — SIGNIFICANT CHANGE UP (ref 67–88)
SODIUM SERPL-SCNC: 121 MMOL/L — LOW (ref 135–145)
SODIUM SERPL-SCNC: 122 MMOL/L — LOW (ref 135–145)
WBC # BLD: 7 K/UL — SIGNIFICANT CHANGE UP (ref 3.8–10.5)
WBC # FLD AUTO: 7 K/UL — SIGNIFICANT CHANGE UP (ref 3.8–10.5)

## 2019-05-18 PROCEDURE — 93010 ELECTROCARDIOGRAM REPORT: CPT

## 2019-05-18 PROCEDURE — 74177 CT ABD & PELVIS W/CONTRAST: CPT | Mod: 26

## 2019-05-18 PROCEDURE — 99285 EMERGENCY DEPT VISIT HI MDM: CPT | Mod: GC,25

## 2019-05-18 RX ORDER — DEXTROSE 50 % IN WATER 50 %
50 SYRINGE (ML) INTRAVENOUS ONCE
Refills: 0 | Status: COMPLETED | OUTPATIENT
Start: 2019-05-18 | End: 2019-05-18

## 2019-05-18 RX ORDER — SODIUM CHLORIDE 9 MG/ML
1000 INJECTION INTRAMUSCULAR; INTRAVENOUS; SUBCUTANEOUS ONCE
Refills: 0 | Status: COMPLETED | OUTPATIENT
Start: 2019-05-18 | End: 2019-05-18

## 2019-05-18 RX ORDER — INSULIN HUMAN 100 [IU]/ML
8 INJECTION, SOLUTION SUBCUTANEOUS ONCE
Refills: 0 | Status: COMPLETED | OUTPATIENT
Start: 2019-05-18 | End: 2019-05-18

## 2019-05-18 RX ORDER — SODIUM CHLORIDE 9 MG/ML
1000 INJECTION INTRAMUSCULAR; INTRAVENOUS; SUBCUTANEOUS
Refills: 0 | Status: DISCONTINUED | OUTPATIENT
Start: 2019-05-18 | End: 2019-05-19

## 2019-05-18 RX ADMIN — SODIUM CHLORIDE 2000 MILLILITER(S): 9 INJECTION INTRAMUSCULAR; INTRAVENOUS; SUBCUTANEOUS at 21:07

## 2019-05-18 RX ADMIN — SODIUM CHLORIDE 75 MILLILITER(S): 9 INJECTION INTRAMUSCULAR; INTRAVENOUS; SUBCUTANEOUS at 23:44

## 2019-05-18 RX ADMIN — INSULIN HUMAN 8 UNIT(S): 100 INJECTION, SOLUTION SUBCUTANEOUS at 23:43

## 2019-05-18 RX ADMIN — Medication 50 MILLILITER(S): at 23:44

## 2019-05-18 NOTE — ED ADULT NURSE NOTE - CHPI ED NUR SYMPTOMS NEG
no hematuria/no diarrhea/no nausea/no vomiting/no fever/no abdominal distension/no blood in stool/no burning urination/no chills/no dysuria

## 2019-05-18 NOTE — ED PROVIDER NOTE - OBJECTIVE STATEMENT
83M pmhx BPH, prostate ca, CAD, MI s/p stents, DM, GERD, HLD, HTN, recent diagnosis colon ca with resection 2/2019 w/ Dr. Leon (no chemo/radiation), recently placed on home hospice care presents to ED c/o abdominal pain for the past few days w/ no ostomy output for 24 hours. Patient daughter is at bedside and is the primary care taker. States patient has reported mild abdominal pain for the past few days, noted change in ostomy output 2 days ago with not output x 24 hours. Denies fevers, chills, chest pain, sob, n/v, change in urination

## 2019-05-18 NOTE — ED ADULT NURSE NOTE - NSIMPLEMENTINTERV_GEN_ALL_ED
Implemented All Fall Risk Interventions:  [place] to call system. Call bell, personal items and telephone within reach. Instruct patient to call for assistance. Room bathroom lighting operational. Non-slip footwear when patient is off stretcher. Physically safe environment: no spills, clutter or unnecessary equipment. Stretcher in lowest position, wheels locked, appropriate side rails in place. Provide visual cue, wrist band, yellow gown, etc. Monitor gait and stability. Monitor for mental status changes and reorient to person, place, and time. Review medications for side effects contributing to fall risk. Reinforce activity limits and safety measures with patient and family.

## 2019-05-18 NOTE — ED PROVIDER NOTE - PHYSICAL EXAMINATION
CONSTITUTIONAL: Patient is awake, alert and oriented x 3. Patient is well appearing and in no acute distress.  HEAD: NCAT,   NECK: supple,   LUNGS: CTA B/L,  HEART: RRR.+S1S2 no murmurs,   ABDOMEN: Ostomy in place with no output,  diffuse ttp, +bs  EXTREMITY: no edema or calf tenderness b/l,   NEURO: No focal deficits

## 2019-05-18 NOTE — ED ADULT NURSE REASSESSMENT NOTE - NS ED NURSE REASSESS COMMENT FT1
report received from RADHA Jones. Patient is resting in stretcher bed no acute distress. Daughter at bedside. IV 20g in left arm, labs sent as ordered. Patient VSS, afebrile. Patient has stage II bedsore noted to sacral region. Patient able to answer questions, reposition himself and make needs known.

## 2019-05-18 NOTE — ED ADULT NURSE NOTE - OBJECTIVE STATEMENT
pt is an 84 yr M sent in by hospice RN for SBO. as per daughter at bedside, pt has had decreased ileostomy output since Thursday. pt had been having loose output and was taking imodium, Thursday when daughter changed ileostomy dressing she noticed small, hard pebble-like stools and stopped the imodium. pt has not had output since. stoma is pink with small amount of secretions noted. pt reports abd pain worsened with movement. pt had xray at home after hospice nurse assessed pt which showed probable SBO. pt denies sob/n/v/fever/chills. daughter at bedside. no distress.

## 2019-05-18 NOTE — ED PROVIDER NOTE - ATTENDING CONTRIBUTION TO CARE
****ATTENDING**** 82yo male hx listed colon ca metastatic on hospice presents with no output from ileostomy. Daughter at bedside. Pt co chronic abd pain now w persistent pain. No n/v.   On exam, Patient is awake,alert,oriented x 3. Patient is well appearing and in no acute distress. Patient's chest is clear to ausculation, +s1s2. Abdomen is soft nd/ + diffusely tender + output at this time +BS. Extremity with no swelling or calf tenderness.   Check labs, ct to eval for sbo.

## 2019-05-19 DIAGNOSIS — K56.609 UNSPECIFIED INTESTINAL OBSTRUCTION, UNSPECIFIED AS TO PARTIAL VERSUS COMPLETE OBSTRUCTION: ICD-10-CM

## 2019-05-19 LAB
ANION GAP SERPL CALC-SCNC: 18 MMOL/L — HIGH (ref 5–17)
ANION GAP SERPL CALC-SCNC: 18 MMOL/L — HIGH (ref 5–17)
BASE EXCESS BLDV CALC-SCNC: -6.2 MMOL/L — LOW (ref -2–2)
BUN SERPL-MCNC: 48 MG/DL — HIGH (ref 7–23)
BUN SERPL-MCNC: 49 MG/DL — HIGH (ref 7–23)
CA-I SERPL-SCNC: 1.27 MMOL/L — SIGNIFICANT CHANGE UP (ref 1.12–1.3)
CALCIUM SERPL-MCNC: 9.2 MG/DL — SIGNIFICANT CHANGE UP (ref 8.4–10.5)
CALCIUM SERPL-MCNC: 9.7 MG/DL — SIGNIFICANT CHANGE UP (ref 8.4–10.5)
CHLORIDE BLDV-SCNC: 95 MMOL/L — LOW (ref 96–108)
CHLORIDE SERPL-SCNC: 91 MMOL/L — LOW (ref 96–108)
CHLORIDE SERPL-SCNC: 93 MMOL/L — LOW (ref 96–108)
CO2 BLDV-SCNC: 21 MMOL/L — LOW (ref 22–30)
CO2 SERPL-SCNC: 17 MMOL/L — LOW (ref 22–31)
CO2 SERPL-SCNC: 18 MMOL/L — LOW (ref 22–31)
CREAT SERPL-MCNC: 1.08 MG/DL — SIGNIFICANT CHANGE UP (ref 0.5–1.3)
CREAT SERPL-MCNC: 1.13 MG/DL — SIGNIFICANT CHANGE UP (ref 0.5–1.3)
GAS PNL BLDV: 123 MMOL/L — LOW (ref 136–145)
GAS PNL BLDV: SIGNIFICANT CHANGE UP
GAS PNL BLDV: SIGNIFICANT CHANGE UP
GLUCOSE BLDV-MCNC: 277 MG/DL — HIGH (ref 70–99)
GLUCOSE SERPL-MCNC: 240 MG/DL — HIGH (ref 70–99)
GLUCOSE SERPL-MCNC: 283 MG/DL — HIGH (ref 70–99)
HBA1C BLD-MCNC: 7.5 % — HIGH (ref 4–5.6)
HCO3 BLDV-SCNC: 20 MMOL/L — LOW (ref 21–29)
HCT VFR BLDA CALC: 42 % — SIGNIFICANT CHANGE UP (ref 39–50)
HGB BLD CALC-MCNC: 13.8 G/DL — SIGNIFICANT CHANGE UP (ref 13–17)
LACTATE BLDV-MCNC: 2 MMOL/L — SIGNIFICANT CHANGE UP (ref 0.7–2)
OTHER CELLS CSF MANUAL: 3 ML/DL — LOW (ref 18–22)
PCO2 BLDV: 43 MMHG — SIGNIFICANT CHANGE UP (ref 35–50)
PH BLDV: 7.29 — LOW (ref 7.35–7.45)
PO2 BLDV: <20 MMHG — LOW (ref 25–45)
POTASSIUM BLDV-SCNC: 6 MMOL/L — HIGH (ref 3.5–5.3)
POTASSIUM SERPL-MCNC: 5 MMOL/L — SIGNIFICANT CHANGE UP (ref 3.5–5.3)
POTASSIUM SERPL-MCNC: 5.9 MMOL/L — HIGH (ref 3.5–5.3)
POTASSIUM SERPL-SCNC: 5 MMOL/L — SIGNIFICANT CHANGE UP (ref 3.5–5.3)
POTASSIUM SERPL-SCNC: 5.9 MMOL/L — HIGH (ref 3.5–5.3)
SAO2 % BLDV: 16 % — LOW (ref 67–88)
SODIUM SERPL-SCNC: 126 MMOL/L — LOW (ref 135–145)
SODIUM SERPL-SCNC: 129 MMOL/L — LOW (ref 135–145)

## 2019-05-19 PROCEDURE — 93970 EXTREMITY STUDY: CPT | Mod: 26

## 2019-05-19 PROCEDURE — 99222 1ST HOSP IP/OBS MODERATE 55: CPT

## 2019-05-19 RX ORDER — DOCUSATE SODIUM 100 MG
100 CAPSULE ORAL DAILY
Refills: 0 | Status: DISCONTINUED | OUTPATIENT
Start: 2019-05-19 | End: 2019-05-21

## 2019-05-19 RX ORDER — DEXTROSE 50 % IN WATER 50 %
25 SYRINGE (ML) INTRAVENOUS ONCE
Refills: 0 | Status: DISCONTINUED | OUTPATIENT
Start: 2019-05-19 | End: 2019-05-21

## 2019-05-19 RX ORDER — ENOXAPARIN SODIUM 100 MG/ML
40 INJECTION SUBCUTANEOUS DAILY
Refills: 0 | Status: DISCONTINUED | OUTPATIENT
Start: 2019-05-19 | End: 2019-05-21

## 2019-05-19 RX ORDER — INSULIN GLARGINE 100 [IU]/ML
4 INJECTION, SOLUTION SUBCUTANEOUS
Qty: 0 | Refills: 0 | DISCHARGE

## 2019-05-19 RX ORDER — METOPROLOL TARTRATE 50 MG
5 TABLET ORAL EVERY 6 HOURS
Refills: 0 | Status: DISCONTINUED | OUTPATIENT
Start: 2019-05-19 | End: 2019-05-20

## 2019-05-19 RX ORDER — INSULIN LISPRO 100/ML
VIAL (ML) SUBCUTANEOUS EVERY 6 HOURS
Refills: 0 | Status: DISCONTINUED | OUTPATIENT
Start: 2019-05-19 | End: 2019-05-20

## 2019-05-19 RX ORDER — DEXTROSE 50 % IN WATER 50 %
50 SYRINGE (ML) INTRAVENOUS ONCE
Refills: 0 | Status: COMPLETED | OUTPATIENT
Start: 2019-05-19 | End: 2019-05-19

## 2019-05-19 RX ORDER — DEXTROSE 50 % IN WATER 50 %
12.5 SYRINGE (ML) INTRAVENOUS ONCE
Refills: 0 | Status: DISCONTINUED | OUTPATIENT
Start: 2019-05-19 | End: 2019-05-21

## 2019-05-19 RX ORDER — INSULIN HUMAN 100 [IU]/ML
10 INJECTION, SOLUTION SUBCUTANEOUS ONCE
Refills: 0 | Status: COMPLETED | OUTPATIENT
Start: 2019-05-19 | End: 2019-05-19

## 2019-05-19 RX ORDER — SENNA PLUS 8.6 MG/1
1 TABLET ORAL DAILY
Refills: 0 | Status: DISCONTINUED | OUTPATIENT
Start: 2019-05-19 | End: 2019-05-21

## 2019-05-19 RX ORDER — GLUCAGON INJECTION, SOLUTION 0.5 MG/.1ML
1 INJECTION, SOLUTION SUBCUTANEOUS ONCE
Refills: 0 | Status: DISCONTINUED | OUTPATIENT
Start: 2019-05-19 | End: 2019-05-21

## 2019-05-19 RX ORDER — RANITIDINE HYDROCHLORIDE 150 MG/1
1 TABLET, FILM COATED ORAL
Qty: 0 | Refills: 0 | DISCHARGE

## 2019-05-19 RX ORDER — INSULIN LISPRO 100/ML
0 VIAL (ML) SUBCUTANEOUS
Qty: 0 | Refills: 0 | DISCHARGE

## 2019-05-19 RX ORDER — ACETAMINOPHEN 500 MG
650 TABLET ORAL ONCE
Refills: 0 | Status: COMPLETED | OUTPATIENT
Start: 2019-05-19 | End: 2019-05-19

## 2019-05-19 RX ORDER — INSULIN GLARGINE 100 [IU]/ML
4 INJECTION, SOLUTION SUBCUTANEOUS AT BEDTIME
Refills: 0 | Status: DISCONTINUED | OUTPATIENT
Start: 2019-05-19 | End: 2019-05-20

## 2019-05-19 RX ORDER — DEXTROSE 50 % IN WATER 50 %
15 SYRINGE (ML) INTRAVENOUS ONCE
Refills: 0 | Status: DISCONTINUED | OUTPATIENT
Start: 2019-05-19 | End: 2019-05-21

## 2019-05-19 RX ORDER — SODIUM CHLORIDE 9 MG/ML
1000 INJECTION, SOLUTION INTRAVENOUS
Refills: 0 | Status: DISCONTINUED | OUTPATIENT
Start: 2019-05-19 | End: 2019-05-21

## 2019-05-19 RX ORDER — INSULIN GLARGINE 100 [IU]/ML
8 INJECTION, SOLUTION SUBCUTANEOUS
Qty: 0 | Refills: 0 | DISCHARGE

## 2019-05-19 RX ADMIN — INSULIN HUMAN 10 UNIT(S): 100 INJECTION, SOLUTION SUBCUTANEOUS at 03:49

## 2019-05-19 RX ADMIN — SODIUM CHLORIDE 75 MILLILITER(S): 9 INJECTION INTRAMUSCULAR; INTRAVENOUS; SUBCUTANEOUS at 06:11

## 2019-05-19 RX ADMIN — SENNA PLUS 1 TABLET(S): 8.6 TABLET ORAL at 13:35

## 2019-05-19 RX ADMIN — ENOXAPARIN SODIUM 40 MILLIGRAM(S): 100 INJECTION SUBCUTANEOUS at 13:35

## 2019-05-19 RX ADMIN — Medication 3: at 17:13

## 2019-05-19 RX ADMIN — Medication 3: at 13:43

## 2019-05-19 RX ADMIN — Medication 100 MILLIGRAM(S): at 13:34

## 2019-05-19 RX ADMIN — SODIUM CHLORIDE 75 MILLILITER(S): 9 INJECTION INTRAMUSCULAR; INTRAVENOUS; SUBCUTANEOUS at 13:41

## 2019-05-19 RX ADMIN — Medication 50 MILLILITER(S): at 03:50

## 2019-05-19 RX ADMIN — Medication 260 MILLIGRAM(S): at 06:09

## 2019-05-19 RX ADMIN — Medication 650 MILLIGRAM(S): at 06:40

## 2019-05-19 NOTE — H&P ADULT - NSICDXPASTSURGICALHX_GEN_ALL_CORE_FT
PAST SURGICAL HISTORY:  CAD (coronary artery disease) 1 cardiac stent 2006 was on plavix, currently pt is not on aspirin    History of prostate surgery 2007 seed implants

## 2019-05-19 NOTE — PROVIDER CONTACT NOTE (OTHER) - ASSESSMENT
Pt refusing AM blood draw and finger stick to check blood sugar at this time. Pt becoming combative saying that if we try to get his blood he will call the , pt A&Ox2. Reasoning for blood draw and blood sugar check tried to be explained to pt, pt began yelling. Attempts by two different staff members made, both attempts were denied by pt.

## 2019-05-19 NOTE — H&P ADULT - NSHPLABSRESULTS_GEN_ALL_CORE
14.3   7.0   )-----------( 212      ( 18 May 2019 19:50 )             45.3   05-18    121<L>  |  84<L>  |  54<H>  ----------------------------<  206<H>  6.2<HH>   |  15<L>  |  1.15    Ca    9.8      18 May 2019 21:09    TPro  6.9  /  Alb  3.7  /  TBili  0.4  /  DBili  x   /  AST  11  /  ALT  7<L>  /  AlkPhos  53  05-18  Blood Gas Venous - Lactate: 1.7 mmoL/L (05.18.19 @ 21:09)  < from: CT Abdomen and Pelvis w/ Oral Cont and w/ IV Cont (05.18.19 @ 21:32) >    BOWEL: The stomach is collapsed. Proximal small bowel loops are   collapsed. There are postsurgical changes involving small bowel in the   right hemiabdomen  Multiple loops of fluid-filled, distended small bowel   consistent with small bowel obstruction, transition point related to the   ostomy.  Status post right colectomy.   PERITONEUM: No ascites or pneumoperitoneum..  VESSELS:  Atherosclerosis.  RETROPERITONEUM: Redemonstration of lymphadenopathy, unchanged from   5/1/2019. For reference, aortocaval lymph node measures 1.5 x 1.1 (2:30)   and distal preaortic lymph node measures 1.3 x 0.9 cm (2:54.)  ABDOMINAL WALL: Anasarca.  BONES: Degenerative change.    IMPRESSION:   Interval development of pulmonary emboli involving right lower lobe   segmental and subsegmental pulmonary arteries.  High-grade small bowel obstruction with transition point at the site of   the patient's ostomy.    < end of copied text >

## 2019-05-19 NOTE — H&P ADULT - ASSESSMENT
84M w/ PMHx most notable currently of High grade metastatic adenocarcinoma of cecum s/p ex lap, SBR, right colectomy with end ileostomy on 2/15/19 ( final path [id number]) presenting to ED for decreasing ostomy output for last 2-3 days now found to have SBO at level of ostomy, now s/p red rubber catheter placement into ostomy with stool and gas noted     - Admit to Dr Duran  - NPO / IVF ( NS given hyponatremia)  - f/u repeat BMP for Hyperkalemia  - c/w home meds  - pain control PRN  - will discuss risks/ benefits of full anticoagulation for pulmonary embolus noted on CT, patient currently denying any SOB or difficulty breathing  - DVT ppx    S Amor PGY-2  Blue team surgery

## 2019-05-19 NOTE — H&P ADULT - NSHPPHYSICALEXAM_GEN_ALL_CORE
Gen: thin cachetic man, very uncomfortable   Resp: Breathing comfortably on RA  Cardiac: RRR  GI: thin , nondistended, ttp near RLQ ileostomy otherwise nontender. Able to digitize ostomy at bedside, significant stool and gas passed. Ostomy pink with loose watery stool.  Groin: normal appearing  Ext: warm, moving all ext

## 2019-05-19 NOTE — ED ADULT NURSE REASSESSMENT NOTE - NS ED NURSE REASSESS COMMENT FT1
Patient seen by surgery, catheter placed to ostomy site to drain air and stool. Patient had increased output of stool and air. Patient reports improvement in discomfort after procedure. Safety maintained.

## 2019-05-19 NOTE — PROVIDER CONTACT NOTE (OTHER) - ACTION/TREATMENT ORDERED:
Team rounded on pt, attempt to get blood draw and check finger stick with daughter returns, pt seems more agreeable with her present. WIll continue to monitor.

## 2019-05-19 NOTE — H&P ADULT - NSICDXPASTMEDICALHX_GEN_ALL_CORE_FT
PAST MEDICAL HISTORY:  Benign neoplasm of colon     Benign prostatic hypertrophy     CAD (coronary artery disease)     Diabetes Type II    GERD (gastroesophageal reflux disease)     HLD (hyperlipidemia)     HTN (hypertension)     Lymphoma     MI (myocardial infarction)     Prostate cancer     Stented coronary artery 2006

## 2019-05-19 NOTE — H&P ADULT - HISTORY OF PRESENT ILLNESS
84M w/ PMHx most notable currently of High grade metastatic adenocarcinoma of cecum s.p ex lap, SBR, right colectomy with end ileostomy on 2/15/19 ( final path [id number]) patient presented to ED for decreasing ostomy output for last 2-3 days. As per son and daughter at bedside, patient has chronic abdominal pain so difficult to gauge is worse today but he does appear more uncomfortable. While in ED, noted to have some watery stool output after CT. Labs on presentation showed marked electrolyte derangements and elevated lactate. CT performed demonstrated SBO with transition point at level of ostomy. Surgery consulted for further management. At bedside, patient lying bed complaining of significant abdominal pain. Ostomy noted to have about 300 cc of watery stool, digitized ostomy and felt opening of bowel, placed red rubber catheter into ostomy with significant gas and stool noted. Red rubber left in place .

## 2019-05-20 ENCOUNTER — TRANSCRIPTION ENCOUNTER (OUTPATIENT)
Age: 84
End: 2019-05-20

## 2019-05-20 LAB
ANION GAP SERPL CALC-SCNC: 12 MMOL/L — SIGNIFICANT CHANGE UP (ref 5–17)
BUN SERPL-MCNC: 30 MG/DL — HIGH (ref 7–23)
CALCIUM SERPL-MCNC: 9.7 MG/DL — SIGNIFICANT CHANGE UP (ref 8.4–10.5)
CHLORIDE SERPL-SCNC: 92 MMOL/L — LOW (ref 96–108)
CO2 SERPL-SCNC: 22 MMOL/L — SIGNIFICANT CHANGE UP (ref 22–31)
CREAT SERPL-MCNC: 0.76 MG/DL — SIGNIFICANT CHANGE UP (ref 0.5–1.3)
GLUCOSE SERPL-MCNC: 279 MG/DL — HIGH (ref 70–99)
HCT VFR BLD CALC: 40.6 % — SIGNIFICANT CHANGE UP (ref 39–50)
HGB BLD-MCNC: 13.8 G/DL — SIGNIFICANT CHANGE UP (ref 13–17)
MAGNESIUM SERPL-MCNC: 2.3 MG/DL — SIGNIFICANT CHANGE UP (ref 1.6–2.6)
MCHC RBC-ENTMCNC: 27.5 PG — SIGNIFICANT CHANGE UP (ref 27–34)
MCHC RBC-ENTMCNC: 33.9 GM/DL — SIGNIFICANT CHANGE UP (ref 32–36)
MCV RBC AUTO: 81 FL — SIGNIFICANT CHANGE UP (ref 80–100)
PHOSPHATE SERPL-MCNC: 2 MG/DL — LOW (ref 2.5–4.5)
PLATELET # BLD AUTO: 219 K/UL — SIGNIFICANT CHANGE UP (ref 150–400)
POTASSIUM SERPL-MCNC: 4.9 MMOL/L — SIGNIFICANT CHANGE UP (ref 3.5–5.3)
POTASSIUM SERPL-SCNC: 4.9 MMOL/L — SIGNIFICANT CHANGE UP (ref 3.5–5.3)
RBC # BLD: 5.01 M/UL — SIGNIFICANT CHANGE UP (ref 4.2–5.8)
RBC # FLD: 14.4 % — SIGNIFICANT CHANGE UP (ref 10.3–14.5)
SODIUM SERPL-SCNC: 126 MMOL/L — LOW (ref 135–145)
WBC # BLD: 5.1 K/UL — SIGNIFICANT CHANGE UP (ref 3.8–10.5)
WBC # FLD AUTO: 5.1 K/UL — SIGNIFICANT CHANGE UP (ref 3.8–10.5)

## 2019-05-20 PROCEDURE — 99232 SBSQ HOSP IP/OBS MODERATE 35: CPT | Mod: GV

## 2019-05-20 RX ORDER — ATENOLOL 25 MG/1
50 TABLET ORAL DAILY
Refills: 0 | Status: DISCONTINUED | OUTPATIENT
Start: 2019-05-20 | End: 2019-05-21

## 2019-05-20 RX ORDER — DOCUSATE SODIUM 100 MG
1 CAPSULE ORAL
Qty: 0 | Refills: 0 | DISCHARGE
Start: 2019-05-20

## 2019-05-20 RX ORDER — SENNA PLUS 8.6 MG/1
1 TABLET ORAL
Qty: 0 | Refills: 0 | DISCHARGE
Start: 2019-05-20

## 2019-05-20 RX ORDER — TAMSULOSIN HYDROCHLORIDE 0.4 MG/1
0.4 CAPSULE ORAL AT BEDTIME
Refills: 0 | Status: DISCONTINUED | OUTPATIENT
Start: 2019-05-20 | End: 2019-05-21

## 2019-05-20 RX ORDER — INSULIN LISPRO 100/ML
VIAL (ML) SUBCUTANEOUS
Refills: 0 | Status: DISCONTINUED | OUTPATIENT
Start: 2019-05-20 | End: 2019-05-21

## 2019-05-20 RX ORDER — INSULIN GLARGINE 100 [IU]/ML
8 INJECTION, SOLUTION SUBCUTANEOUS AT BEDTIME
Refills: 0 | Status: DISCONTINUED | OUTPATIENT
Start: 2019-05-20 | End: 2019-05-21

## 2019-05-20 RX ORDER — ACETAMINOPHEN 500 MG
650 TABLET ORAL ONCE
Refills: 0 | Status: COMPLETED | OUTPATIENT
Start: 2019-05-20 | End: 2019-05-20

## 2019-05-20 RX ORDER — INSULIN LISPRO 100/ML
VIAL (ML) SUBCUTANEOUS AT BEDTIME
Refills: 0 | Status: DISCONTINUED | OUTPATIENT
Start: 2019-05-20 | End: 2019-05-21

## 2019-05-20 RX ADMIN — Medication 62.5 MILLIMOLE(S): at 17:23

## 2019-05-20 RX ADMIN — Medication 650 MILLIGRAM(S): at 16:01

## 2019-05-20 RX ADMIN — Medication 2: at 18:49

## 2019-05-20 RX ADMIN — INSULIN GLARGINE 8 UNIT(S): 100 INJECTION, SOLUTION SUBCUTANEOUS at 22:01

## 2019-05-20 RX ADMIN — TAMSULOSIN HYDROCHLORIDE 0.4 MILLIGRAM(S): 0.4 CAPSULE ORAL at 22:02

## 2019-05-20 RX ADMIN — INSULIN GLARGINE 4 UNIT(S): 100 INJECTION, SOLUTION SUBCUTANEOUS at 00:48

## 2019-05-20 RX ADMIN — Medication 1: at 00:49

## 2019-05-20 RX ADMIN — Medication 650 MILLIGRAM(S): at 15:29

## 2019-05-20 RX ADMIN — Medication 3: at 14:12

## 2019-05-20 RX ADMIN — ENOXAPARIN SODIUM 40 MILLIGRAM(S): 100 INJECTION SUBCUTANEOUS at 12:26

## 2019-05-20 NOTE — DISCHARGE NOTE PROVIDER - HOSPITAL COURSE
84M w/ PMHx most notable currently of High grade metastatic adenocarcinoma of cecum s.p ex lap, SBR, right colectomy with end ileostomy on 2/15/19 ( final path [id number]) patient presented to ED for decreasing ostomy output for last 2-3 days. As per son and daughter at bedside, patient has chronic abdominal pain so difficult to gauge is worse today but he does appear more uncomfortable. While in ED, noted to have some watery stool output after CT. Labs on presentation showed marked electrolyte derangements and elevated lactate. CT performed demonstrated SBO with transition point at level of ostomy with interval development of pulmonary emboli involving right lower lobe     segmental and subsegmental pulmonary arteries.    Surgery consulted for further management. At bedside, patient lying bed complaining of significant abdominal pain. Ostomy noted to have about 300 cc of watery stool, digitized ostomy and felt opening of bowel, placed red rubber catheter into ostomy with significant gas and stool noted. Red rubber left in place .         Decision made not to anticoagulate as risks of bleeding outweigh benefits.         Pt continued to have ostomy function. Red rubber removed and patient continued to ostomy function. Patient tolerating diet.         Pt on home hospice which was reinstated prior to discahrge

## 2019-05-20 NOTE — DISCHARGE NOTE PROVIDER - NSDCCPCAREPLAN_GEN_ALL_CORE_FT
PRINCIPAL DISCHARGE DIAGNOSIS  Diagnosis: Small bowel obstruction  Assessment and Plan of Treatment: diet as tolerated  ostomy care  follow up with Dr. Leon in 1-2 weeks   notify Dr. Leon if develop nausea, vomiting, ostomy stops functioning      SECONDARY DISCHARGE DIAGNOSES  Diagnosis: Hyperkalemia  Assessment and Plan of Treatment:

## 2019-05-20 NOTE — DISCHARGE NOTE PROVIDER - CARE PROVIDER_API CALL
Melo Leon)  Surgery  [address] [place] [address]  Phone: [phone]  Fax: [phone]  Follow Up Time: 2 weeks

## 2019-05-20 NOTE — PROGRESS NOTE ADULT - SUBJECTIVE AND OBJECTIVE BOX
General Surgery Progress Note    SUBJECTIVE:  The patient was seen and examined. No acute events overnight. Ostomy functioning with air/stool, red rubber was removed. Katalina diet, no N/V.    OBJECTIVE:     ** VITAL SIGNS / I&O's **    Vital Signs Last 24 Hrs  T(C): 36.3 (20 May 2019 06:01), Max: 37 (19 May 2019 14:08)  T(F): 97.4 (20 May 2019 06:01), Max: 98.6 (19 May 2019 14:08)  HR: 57 (20 May 2019 06:01) (50 - 60)  BP: 97/52 (20 May 2019 06:01) (88/58 - 108/62)  BP(mean): --  RR: 18 (20 May 2019 06:01) (17 - 18)  SpO2: 94% (20 May 2019 06:01) (94% - 96%)      18 May 2019 07:01  -  19 May 2019 07:00  --------------------------------------------------------  IN:    sodium chloride 0.9%: 150 mL    Solution: 65 mL  Total IN: 215 mL    OUT:    Ileostomy: 800 mL    Voided: 250 mL  Total OUT: 1050 mL    Total NET: -835 mL      19 May 2019 07:01  -  20 May 2019 06:39  --------------------------------------------------------  IN:    Oral Fluid: 1080 mL  Total IN: 1080 mL    OUT:    Ileostomy: 1400 mL    Voided: 950 mL  Total OUT: 2350 mL    Total NET: -1270 mL          ** PHYSICAL EXAM **    -- CONSTITUTIONAL: Alert, NAD, cachetic appearing  -- PULMONARY: non-labored respirations  -- ABDOMEN: soft, non-distended, non-tender, ostomy with stool/air in appliance  -- NEURO: A&Ox3    ** LABS **                          14.3   7.0   )-----------( 212      ( 18 May 2019 19:50 )             45.3     19 May 2019 09:18    129    |  93     |  48     ----------------------------<  240    5.0     |  18     |  1.08     Ca    9.2        19 May 2019 09:18    TPro  6.9    /  Alb  3.7    /  TBili  0.4    /  DBili  x      /  AST  11     /  ALT  7      /  AlkPhos  53     18 May 2019 19:50    PT/INR - ( 18 May 2019 19:50 )   PT: 11.4 sec;   INR: 0.99 ratio         PTT - ( 18 May 2019 19:50 )  PTT:28.5 sec  CAPILLARY BLOOD GLUCOSE      POCT Blood Glucose.: 257 mg/dL (19 May 2019 16:56)  POCT Blood Glucose.: 270 mg/dL (19 May 2019 13:30)        LIVER FUNCTIONS - ( 18 May 2019 19:50 )  Alb: 3.7 g/dL / Pro: 6.9 g/dL / ALK PHOS: 53 U/L / ALT: 7 U/L / AST: 11 U/L / GGT: x                 MEDICATIONS  (STANDING):  dextrose 5%. 1000 milliLiter(s) (50 mL/Hr) IV Continuous <Continuous>  dextrose 50% Injectable 12.5 Gram(s) IV Push once  dextrose 50% Injectable 25 Gram(s) IV Push once  dextrose 50% Injectable 25 Gram(s) IV Push once  docusate sodium 100 milliGRAM(s) Oral daily  enoxaparin Injectable 40 milliGRAM(s) SubCutaneous daily  insulin glargine Injectable (LANTUS) 4 Unit(s) SubCutaneous at bedtime  insulin lispro (HumaLOG) corrective regimen sliding scale   SubCutaneous every 6 hours  metoprolol tartrate Injectable 5 milliGRAM(s) IV Push every 6 hours  senna 1 Tablet(s) Oral daily    MEDICATIONS  (PRN):  dextrose 40% Gel 15 Gram(s) Oral once PRN Blood Glucose LESS THAN 70 milliGRAM(s)/deciLiter  glucagon  Injectable 1 milliGRAM(s) IntraMuscular once PRN Glucose <70 milliGRAM(s)/deciLiter

## 2019-05-20 NOTE — DISCHARGE NOTE NURSING/CASE MANAGEMENT/SOCIAL WORK - NSDCDPATPORTLINK_GEN_ALL_CORE
You can access the [facility] Patient Portal, offered by [facility], by registering with the following website: [email or web address]

## 2019-05-20 NOTE — PROGRESS NOTE ADULT - ASSESSMENT
A: 84M w/ PMHx of High grade metastatic adenocarcinoma of cecum s/p ex lap, SBR, right colectomy with end ileostomy on 2/15/19 (final path [id number]) presenting to ED for decreasing ostomy output for last 2-3 days, now found to have SBO at level of ostomy, now s/p red rubber catheter placement into ostomy.    P:  - DIET: renal diet  - No AC for PE found on CT (risk outweighs benefit)  - Monitor ostomy function  - Pain control  - No daily labs necessary  - DVT ppx: flavia Mendez, PGY-1  Blue Team General Surgery [phone]

## 2019-05-21 VITALS
DIASTOLIC BLOOD PRESSURE: 59 MMHG | OXYGEN SATURATION: 95 % | SYSTOLIC BLOOD PRESSURE: 99 MMHG | TEMPERATURE: 98 F | RESPIRATION RATE: 18 BRPM | HEART RATE: 59 BPM

## 2019-05-21 PROCEDURE — 86850 RBC ANTIBODY SCREEN: CPT

## 2019-05-21 PROCEDURE — 84295 ASSAY OF SERUM SODIUM: CPT

## 2019-05-21 PROCEDURE — 84132 ASSAY OF SERUM POTASSIUM: CPT

## 2019-05-21 PROCEDURE — 82803 BLOOD GASES ANY COMBINATION: CPT

## 2019-05-21 PROCEDURE — 93005 ELECTROCARDIOGRAM TRACING: CPT

## 2019-05-21 PROCEDURE — 86901 BLOOD TYPING SEROLOGIC RH(D): CPT

## 2019-05-21 PROCEDURE — 85610 PROTHROMBIN TIME: CPT

## 2019-05-21 PROCEDURE — 93970 EXTREMITY STUDY: CPT

## 2019-05-21 PROCEDURE — 82330 ASSAY OF CALCIUM: CPT

## 2019-05-21 PROCEDURE — 85014 HEMATOCRIT: CPT

## 2019-05-21 PROCEDURE — 85027 COMPLETE CBC AUTOMATED: CPT

## 2019-05-21 PROCEDURE — 83735 ASSAY OF MAGNESIUM: CPT

## 2019-05-21 PROCEDURE — 96375 TX/PRO/DX INJ NEW DRUG ADDON: CPT

## 2019-05-21 PROCEDURE — 80053 COMPREHEN METABOLIC PANEL: CPT

## 2019-05-21 PROCEDURE — 85730 THROMBOPLASTIN TIME PARTIAL: CPT

## 2019-05-21 PROCEDURE — 99232 SBSQ HOSP IP/OBS MODERATE 35: CPT | Mod: GV

## 2019-05-21 PROCEDURE — 84100 ASSAY OF PHOSPHORUS: CPT

## 2019-05-21 PROCEDURE — 83605 ASSAY OF LACTIC ACID: CPT

## 2019-05-21 PROCEDURE — 86900 BLOOD TYPING SEROLOGIC ABO: CPT

## 2019-05-21 PROCEDURE — 83036 HEMOGLOBIN GLYCOSYLATED A1C: CPT

## 2019-05-21 PROCEDURE — 82435 ASSAY OF BLOOD CHLORIDE: CPT

## 2019-05-21 PROCEDURE — 99285 EMERGENCY DEPT VISIT HI MDM: CPT | Mod: 25

## 2019-05-21 PROCEDURE — 82947 ASSAY GLUCOSE BLOOD QUANT: CPT

## 2019-05-21 PROCEDURE — 82962 GLUCOSE BLOOD TEST: CPT

## 2019-05-21 PROCEDURE — 74177 CT ABD & PELVIS W/CONTRAST: CPT

## 2019-05-21 PROCEDURE — 96374 THER/PROPH/DIAG INJ IV PUSH: CPT | Mod: XU

## 2019-05-21 PROCEDURE — 80048 BASIC METABOLIC PNL TOTAL CA: CPT

## 2019-05-21 RX ADMIN — ATENOLOL 50 MILLIGRAM(S): 25 TABLET ORAL at 05:18

## 2019-05-21 NOTE — PROGRESS NOTE ADULT - SUBJECTIVE AND OBJECTIVE BOX
General Surgery Progress Note    SUBJECTIVE:  The patient was seen and examined. No acute events overnight. Ostomy functioning with air/stool. Katalina diet, no N/V.      OBJECTIVE:     ** VITAL SIGNS / I&O's **    Vital Signs Last 24 Hrs  T(C): 36.4 (21 May 2019 05:11), Max: 37.1 (20 May 2019 10:29)  T(F): 97.5 (21 May 2019 05:11), Max: 98.7 (20 May 2019 10:29)  HR: 66 (21 May 2019 05:11) (59 - 70)  BP: 105/62 (21 May 2019 05:11) (88/56 - 105/62)  BP(mean): --  RR: 18 (21 May 2019 05:11) (17 - 18)  SpO2: 95% (21 May 2019 05:11) (95% - 97%)      19 May 2019 07:01  -  20 May 2019 07:00  --------------------------------------------------------  IN:    Oral Fluid: 1080 mL  Total IN: 1080 mL    OUT:    Ileostomy: 1400 mL    Voided: 950 mL  Total OUT: 2350 mL    Total NET: -1270 mL      20 May 2019 07:01  -  21 May 2019 06:57  --------------------------------------------------------  IN:    Oral Fluid: 680 mL    Solution: 250 mL  Total IN: 930 mL    OUT:    Ileostomy: 1125 mL    Voided: 925 mL  Total OUT: 2050 mL    Total NET: -1120 mL          ** PHYSICAL EXAM **    -- CONSTITUTIONAL: Alert, NAD, cachetic appearing  -- PULMONARY: non-labored respirations  -- ABDOMEN: soft, non-distended, non-tender, ostomy with stool/air in appliance  -- NEURO: A&Ox3    ** LABS **                          13.8   5.1   )-----------( 219      ( 20 May 2019 16:09 )             40.6     20 May 2019 16:09    126    |  92     |  30     ----------------------------<  279    4.9     |  22     |  0.76     Ca    9.7        20 May 2019 16:09  Phos  2.0       20 May 2019 16:09  Mg     2.3       20 May 2019 16:09        CAPILLARY BLOOD GLUCOSE      POCT Blood Glucose.: 230 mg/dL (20 May 2019 21:56)  POCT Blood Glucose.: 229 mg/dL (20 May 2019 18:46)  POCT Blood Glucose.: 278 mg/dL (20 May 2019 14:07)  POCT Blood Glucose.: 154 mg/dL (20 May 2019 08:54)                MEDICATIONS  (STANDING):  ATENolol  Tablet 50 milliGRAM(s) Oral daily  dextrose 5%. 1000 milliLiter(s) (50 mL/Hr) IV Continuous <Continuous>  dextrose 50% Injectable 12.5 Gram(s) IV Push once  dextrose 50% Injectable 25 Gram(s) IV Push once  dextrose 50% Injectable 25 Gram(s) IV Push once  docusate sodium 100 milliGRAM(s) Oral daily  enoxaparin Injectable 40 milliGRAM(s) SubCutaneous daily  insulin glargine Injectable (LANTUS) 8 Unit(s) SubCutaneous at bedtime  insulin lispro (HumaLOG) corrective regimen sliding scale   SubCutaneous three times a day before meals  insulin lispro (HumaLOG) corrective regimen sliding scale   SubCutaneous at bedtime  senna 1 Tablet(s) Oral daily  tamsulosin 0.4 milliGRAM(s) Oral at bedtime    MEDICATIONS  (PRN):  dextrose 40% Gel 15 Gram(s) Oral once PRN Blood Glucose LESS THAN 70 milliGRAM(s)/deciLiter  glucagon  Injectable 1 milliGRAM(s) IntraMuscular once PRN Glucose <70 milliGRAM(s)/deciLiter

## 2019-05-21 NOTE — PROGRESS NOTE ADULT - ASSESSMENT
A: 84M w/ PMHx of High grade metastatic adenocarcinoma of cecum s/p ex lap, SBR, right colectomy with end ileostomy on 2/15/19 (final path [id number]) presenting to ED for decreasing ostomy output for last 2-3 days, now found to have SBO at level of ostomy, now s/p red rubber catheter placement into ostomy.    P:  - DIET: renal diet  - No AC for PE found on CT (risk outweighs benefit)  - Monitor ostomy function  - Pain control  - No daily labs necessary  - DVT ppx: flavia Mendez, PGY-1  [facility] [phone] A: 84M w/ PMHx of High grade metastatic adenocarcinoma of cecum s/p ex lap, SBR, right colectomy with end ileostomy on 2/15/19 (final path [id number]) presenting to ED for decreasing ostomy output for last 2-3 days, now found to have SBO at level of ostomy, now s/p red rubber catheter placement into ostomy.    P:  - DIET: renal diet  - No AC for PE found on CT (risk outweighs benefit)  - Monitor ostomy function  - Pain control  - No daily labs necessary  - DVT ppx: lovenox  - D/C today to hospice care    Catherine Mendez, PGY-1  [facility] General Surgery [phone] A: 84M w/ PMHx of High grade metastatic adenocarcinoma of cecum s/p ex lap, SBR, right colectomy with end ileostomy on 2/15/19 (final path [id number]) presenting to ED for decreasing ostomy output for last 2-3 days, now found to have SBO at level of ostomy, now s/p red rubber catheter placement into ostomy.    P:  - DIET: renal diet  - No AC for PE found on CT (risk outweighs benefit)  - Monitor ostomy function  - Pain control  - No daily labs necessary  - DVT ppx: lovenox  - Poss D/C to hospice care  - Care per primary team  - Surgical team will sign off, please reconsult as necessary    Catherine Mendez, PGY-1  [facility] [phone]

## 2019-06-10 ENCOUNTER — APPOINTMENT (OUTPATIENT)
Dept: SURGICAL ONCOLOGY | Facility: CLINIC | Age: 84
End: 2019-06-10

## 2019-06-10 DIAGNOSIS — C18.0 MALIGNANT NEOPLASM OF CECUM: ICD-10-CM

## 2019-07-01 ENCOUNTER — APPOINTMENT (OUTPATIENT)
Dept: HEMATOLOGY ONCOLOGY | Facility: CLINIC | Age: 84
End: 2019-07-01

## 2019-07-12 NOTE — ED PROVIDER NOTE - CPE EDP [facility] NORM
Please notify patient that he can skip the renal/kidney ultrasound since he passed a stone.   Thanks, Dr. Samantha Chew normal...

## 2020-08-06 NOTE — PRE-OP CHECKLIST - LAST TOOK
[FreeTextEntry1] : Mr Hsieh is continuing to experience episodes of atrial clip several times.  His exam shows regular rhythm, normal blood pressure, clear lungs, and a normal cardiac exam.  His EKG is within normal limits.  We discussed the pros and cons of a repeat ablation and I told him I was in favor of the procedure.  He will follow-up here in December. clears

## 2022-06-07 NOTE — PATIENT PROFILE ADULT - NSPROGENANESREACTION_GEN_A_NUR
no previous reaction Adbry Counseling: I discussed with the patient the risks of tralokinumab including but not limited to eye infection and irritation, cold sores, injection site reactions, worsening of asthma, allergic reactions and increased risk of parasitic infection.  Live vaccines should be avoided while taking tralokinumab. The patient understands that monitoring is required and they must alert us or the primary physician if symptoms of infection or other concerning signs are noted.

## 2023-05-23 NOTE — PATIENT PROFILE ADULT - BRADEN FRICTION AND SHEAR
Patient had to reschedule due to his injection test wouldn't be done until 6/17/23. He was wondering if he could get a refill of PREDNISONE 1MG tab to last him until July 21st. Thank you.  
(2) potential problem

## 2024-05-24 NOTE — HISTORY OF PRESENT ILLNESS
20g x 1.75in PIV placed in Left Upper Arm by TRACY using USG.     [de-identified] : Mr. Alvarado is a 83 year old male who returns for follow up after an initial consultation on 10/22/18 (following a recent admission to [facility]).  His history is significant for prostate cancer (s/p rads, seeding), BPH, GERD, DM, CAD s/p stent (2006- currently OFF plavix).  He presented to [facility] on 10/3/18 with abdominal pain x 3 months and a 30 pound weight loss according to his daughter.  He underwent a CT abd on 10/3/18 which revealed a 2.6x2.0cm cecal mass with adjacent adenopathy concerning for metastasis.  On 10/5/18 he had a colonoscopy at which time the mass was biopsied  with final pathology of inflamed tubular adenoma.  He underwent a CT chest on 10/19/18 which revealed supraclavicular, axillary and retroperitoneal adenopathy.\par \par I referred him for a PET/CT which was done on 10/25/18.  Study demonstrated a cecal mass with heterogeneous FDG activity, and mildly FDG avid adjacent mesenteric lymphadenopathy.  There is minimal FDG avid bilateral nodular adrenal thickening stated to be probably benign.  There are non specific, non FDG avid retroperitoneal, cervical lymph and axillary lymph nodes nodes as well. There is no mediastinal lymphadenopathy.   He underwent a Right axillary LN biopsy which was positive for malignancy.  He has since met with Heme/Onc Dr. 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 Arshad who believes he can be cleared for surgery.  Today she is without any complaints.  Tolerating PO intake without nausea, vomiting or abdominal pain. Reports daily BM's without BRBPR and passing flatus. Denies fever or chills. \par \par Internist: Dr. Mando Arshad\par Referring: Dr. Zofia Duckworth

## 2025-02-07 NOTE — ED ADULT NURSE NOTE - PAIN: PRESENCE, MLM
- Well controlled, continue metoprolol 50 mg p.o. q.h.s., amlodipine 10 mg p.o. daily and valsartan 320 mg p.o. daily   denies pain/discomfort
